# Patient Record
Sex: FEMALE | Race: WHITE | Employment: OTHER | URBAN - METROPOLITAN AREA
[De-identification: names, ages, dates, MRNs, and addresses within clinical notes are randomized per-mention and may not be internally consistent; named-entity substitution may affect disease eponyms.]

---

## 2017-01-25 PROBLEM — J44.9 COPD, MILD (HCC): Status: ACTIVE | Noted: 2017-01-25

## 2017-02-24 ENCOUNTER — OFFICE VISIT (OUTPATIENT)
Dept: CARDIOLOGY CLINIC | Age: 67
End: 2017-02-24

## 2017-02-24 VITALS
HEART RATE: 84 BPM | WEIGHT: 115 LBS | BODY MASS INDEX: 18.05 KG/M2 | SYSTOLIC BLOOD PRESSURE: 130 MMHG | DIASTOLIC BLOOD PRESSURE: 88 MMHG | HEIGHT: 67 IN

## 2017-02-24 DIAGNOSIS — I21.02 ST ELEVATION MYOCARDIAL INFARCTION INVOLVING LEFT ANTERIOR DESCENDING (LAD) CORONARY ARTERY (HCC): ICD-10-CM

## 2017-02-24 DIAGNOSIS — I25.810 CORONARY ARTERY DISEASE INVOLVING CORONARY BYPASS GRAFT OF NATIVE HEART WITHOUT ANGINA PECTORIS: ICD-10-CM

## 2017-02-24 DIAGNOSIS — I10 ESSENTIAL HYPERTENSION: Primary | ICD-10-CM

## 2017-02-24 DIAGNOSIS — I34.0 NON-RHEUMATIC MITRAL REGURGITATION: ICD-10-CM

## 2017-02-24 DIAGNOSIS — Z95.1 S/P CABG X 4: ICD-10-CM

## 2017-02-24 DIAGNOSIS — Z72.0 TOBACCO ABUSE: ICD-10-CM

## 2017-02-24 PROCEDURE — 99214 OFFICE O/P EST MOD 30 MIN: CPT | Performed by: INTERNAL MEDICINE

## 2017-02-24 RX ORDER — LISINOPRIL 5 MG/1
5 TABLET ORAL DAILY
Qty: 30 TABLET | Refills: 5 | Status: SHIPPED | OUTPATIENT
Start: 2017-02-24 | End: 2017-09-21 | Stop reason: SDUPTHER

## 2017-02-24 RX ORDER — CLONAZEPAM 0.5 MG/1
0.5 TABLET ORAL 2 TIMES DAILY PRN
COMMUNITY
End: 2017-06-08 | Stop reason: ALTCHOICE

## 2017-02-24 RX ORDER — ATORVASTATIN CALCIUM 40 MG/1
40 TABLET, FILM COATED ORAL DAILY
Qty: 30 TABLET | Refills: 5 | Status: SHIPPED | OUTPATIENT
Start: 2017-02-24 | End: 2017-09-21 | Stop reason: SDUPTHER

## 2017-02-24 RX ORDER — CARVEDILOL 6.25 MG/1
6.25 TABLET ORAL 2 TIMES DAILY
Qty: 60 TABLET | Refills: 5 | Status: SHIPPED | OUTPATIENT
Start: 2017-02-24 | End: 2017-09-21 | Stop reason: SDUPTHER

## 2017-03-07 ENCOUNTER — PROCEDURE VISIT (OUTPATIENT)
Dept: CARDIOLOGY CLINIC | Age: 67
End: 2017-03-07

## 2017-03-07 DIAGNOSIS — Z72.0 TOBACCO ABUSE: ICD-10-CM

## 2017-03-07 DIAGNOSIS — Z95.1 S/P CABG X 4: ICD-10-CM

## 2017-03-07 DIAGNOSIS — I34.0 NON-RHEUMATIC MITRAL REGURGITATION: Primary | ICD-10-CM

## 2017-03-07 DIAGNOSIS — I34.0 NON-RHEUMATIC MITRAL REGURGITATION: ICD-10-CM

## 2017-03-07 DIAGNOSIS — I25.810 CORONARY ARTERY DISEASE INVOLVING CORONARY BYPASS GRAFT OF NATIVE HEART WITHOUT ANGINA PECTORIS: ICD-10-CM

## 2017-03-07 DIAGNOSIS — I21.02 ST ELEVATION MYOCARDIAL INFARCTION INVOLVING LEFT ANTERIOR DESCENDING (LAD) CORONARY ARTERY (HCC): ICD-10-CM

## 2017-03-07 DIAGNOSIS — R07.89 OTHER CHEST PAIN: Primary | ICD-10-CM

## 2017-03-07 DIAGNOSIS — I10 ESSENTIAL HYPERTENSION: ICD-10-CM

## 2017-03-07 LAB
LV EF: 49 %
LV EF: 53 %
LVEF MODALITY: NORMAL
LVEF MODALITY: NORMAL

## 2017-03-07 PROCEDURE — 93016 CV STRESS TEST SUPVJ ONLY: CPT | Performed by: INTERNAL MEDICINE

## 2017-03-07 PROCEDURE — A9500 TC99M SESTAMIBI: HCPCS | Performed by: INTERNAL MEDICINE

## 2017-03-07 PROCEDURE — 93306 TTE W/DOPPLER COMPLETE: CPT | Performed by: INTERNAL MEDICINE

## 2017-03-07 PROCEDURE — 93017 CV STRESS TEST TRACING ONLY: CPT | Performed by: INTERNAL MEDICINE

## 2017-03-07 PROCEDURE — 78452 HT MUSCLE IMAGE SPECT MULT: CPT | Performed by: INTERNAL MEDICINE

## 2017-03-07 PROCEDURE — 93018 CV STRESS TEST I&R ONLY: CPT | Performed by: INTERNAL MEDICINE

## 2017-03-08 ENCOUNTER — TELEPHONE (OUTPATIENT)
Dept: CARDIOLOGY CLINIC | Age: 67
End: 2017-03-08

## 2017-03-09 ENCOUNTER — OFFICE VISIT (OUTPATIENT)
Dept: CARDIOLOGY CLINIC | Age: 67
End: 2017-03-09

## 2017-03-09 VITALS
SYSTOLIC BLOOD PRESSURE: 132 MMHG | HEART RATE: 77 BPM | WEIGHT: 113 LBS | DIASTOLIC BLOOD PRESSURE: 82 MMHG | BODY MASS INDEX: 18.16 KG/M2 | HEIGHT: 66 IN | OXYGEN SATURATION: 97 %

## 2017-03-09 DIAGNOSIS — I34.0 NON-RHEUMATIC MITRAL REGURGITATION: ICD-10-CM

## 2017-03-09 DIAGNOSIS — Z95.1 S/P CABG X 4: ICD-10-CM

## 2017-03-09 DIAGNOSIS — I21.02 ST ELEVATION MYOCARDIAL INFARCTION INVOLVING LEFT ANTERIOR DESCENDING (LAD) CORONARY ARTERY (HCC): ICD-10-CM

## 2017-03-09 DIAGNOSIS — Z72.0 TOBACCO ABUSE: ICD-10-CM

## 2017-03-09 DIAGNOSIS — I25.810 CORONARY ARTERY DISEASE INVOLVING CORONARY BYPASS GRAFT OF NATIVE HEART WITHOUT ANGINA PECTORIS: Primary | ICD-10-CM

## 2017-03-09 DIAGNOSIS — J44.9 COPD, MILD (HCC): ICD-10-CM

## 2017-03-09 PROCEDURE — 99213 OFFICE O/P EST LOW 20 MIN: CPT | Performed by: INTERNAL MEDICINE

## 2017-06-08 ENCOUNTER — OFFICE VISIT (OUTPATIENT)
Dept: CARDIOLOGY CLINIC | Age: 67
End: 2017-06-08

## 2017-06-08 VITALS
DIASTOLIC BLOOD PRESSURE: 80 MMHG | WEIGHT: 108 LBS | HEIGHT: 66 IN | RESPIRATION RATE: 20 BRPM | HEART RATE: 80 BPM | SYSTOLIC BLOOD PRESSURE: 120 MMHG | BODY MASS INDEX: 17.36 KG/M2

## 2017-06-08 DIAGNOSIS — I10 ESSENTIAL HYPERTENSION: ICD-10-CM

## 2017-06-08 DIAGNOSIS — Z72.0 TOBACCO ABUSE: ICD-10-CM

## 2017-06-08 DIAGNOSIS — J43.9 PULMONARY EMPHYSEMA, UNSPECIFIED EMPHYSEMA TYPE (HCC): ICD-10-CM

## 2017-06-08 DIAGNOSIS — Z95.1 S/P CABG X 4: ICD-10-CM

## 2017-06-08 DIAGNOSIS — I34.0 NON-RHEUMATIC MITRAL REGURGITATION: ICD-10-CM

## 2017-06-08 DIAGNOSIS — I25.810 CORONARY ARTERY DISEASE INVOLVING CORONARY BYPASS GRAFT OF NATIVE HEART WITHOUT ANGINA PECTORIS: Primary | ICD-10-CM

## 2017-06-08 PROBLEM — R07.89 ATYPICAL CHEST PAIN: Status: ACTIVE | Noted: 2017-06-08

## 2017-06-08 PROCEDURE — 99214 OFFICE O/P EST MOD 30 MIN: CPT | Performed by: INTERNAL MEDICINE

## 2017-06-08 PROCEDURE — 93000 ELECTROCARDIOGRAM COMPLETE: CPT | Performed by: INTERNAL MEDICINE

## 2017-06-08 RX ORDER — PREDNISONE 10 MG/1
10 TABLET ORAL DAILY
Status: ON HOLD | COMMUNITY
End: 2017-11-23

## 2017-06-08 RX ORDER — DIPHENHYDRAMINE HCL 50 MG
50 CAPSULE ORAL NIGHTLY PRN
COMMUNITY
End: 2019-11-09

## 2017-06-08 RX ORDER — NITROGLYCERIN 0.4 MG/1
0.4 TABLET SUBLINGUAL EVERY 5 MIN PRN
Qty: 25 TABLET | Refills: 3 | Status: SHIPPED | OUTPATIENT
Start: 2017-06-08 | End: 2021-01-05 | Stop reason: SDUPTHER

## 2017-06-08 RX ORDER — ONDANSETRON 4 MG/1
TABLET, ORALLY DISINTEGRATING ORAL
COMMUNITY
End: 2017-09-21 | Stop reason: ALTCHOICE

## 2017-06-08 RX ORDER — BUDESONIDE AND FORMOTEROL FUMARATE DIHYDRATE 160; 4.5 UG/1; UG/1
2 AEROSOL RESPIRATORY (INHALATION) 2 TIMES DAILY
COMMUNITY
End: 2021-01-25 | Stop reason: ALTCHOICE

## 2017-06-08 RX ORDER — ISOSORBIDE MONONITRATE 60 MG/1
30 TABLET, EXTENDED RELEASE ORAL DAILY
Qty: 30 TABLET | Refills: 3 | Status: SHIPPED | OUTPATIENT
Start: 2017-06-08 | End: 2017-09-21 | Stop reason: SDUPTHER

## 2017-06-08 RX ORDER — DOCUSATE SODIUM 100 MG/1
100 CAPSULE, LIQUID FILLED ORAL DAILY PRN
COMMUNITY

## 2017-06-08 RX ORDER — SIMETHICONE 80 MG
80 TABLET,CHEWABLE ORAL PRN
COMMUNITY
End: 2017-09-21

## 2017-06-10 PROBLEM — R07.9 CHEST PAIN: Status: ACTIVE | Noted: 2017-06-08

## 2017-06-14 ENCOUNTER — HOSPITAL ENCOUNTER (OUTPATIENT)
Dept: CARDIAC REHAB | Age: 67
Discharge: HOME | End: 2017-06-14
Attending: NURSE PRACTITIONER | Admitting: NURSE PRACTITIONER

## 2017-09-21 ENCOUNTER — OFFICE VISIT (OUTPATIENT)
Dept: CARDIOLOGY CLINIC | Age: 67
End: 2017-09-21

## 2017-09-21 VITALS
WEIGHT: 111 LBS | DIASTOLIC BLOOD PRESSURE: 100 MMHG | HEART RATE: 104 BPM | BODY MASS INDEX: 17.84 KG/M2 | SYSTOLIC BLOOD PRESSURE: 150 MMHG | HEIGHT: 66 IN

## 2017-09-21 DIAGNOSIS — I25.810 CORONARY ARTERY DISEASE INVOLVING CORONARY BYPASS GRAFT OF NATIVE HEART WITHOUT ANGINA PECTORIS: Primary | ICD-10-CM

## 2017-09-21 DIAGNOSIS — J43.9 PULMONARY EMPHYSEMA, UNSPECIFIED EMPHYSEMA TYPE (HCC): ICD-10-CM

## 2017-09-21 DIAGNOSIS — Z95.1 S/P CABG X 4: ICD-10-CM

## 2017-09-21 DIAGNOSIS — I21.02 ST ELEVATION MYOCARDIAL INFARCTION INVOLVING LEFT ANTERIOR DESCENDING (LAD) CORONARY ARTERY (HCC): ICD-10-CM

## 2017-09-21 DIAGNOSIS — I10 ESSENTIAL HYPERTENSION: ICD-10-CM

## 2017-09-21 DIAGNOSIS — I34.0 NON-RHEUMATIC MITRAL REGURGITATION: ICD-10-CM

## 2017-09-21 DIAGNOSIS — Z72.0 TOBACCO ABUSE: ICD-10-CM

## 2017-09-21 PROCEDURE — 99214 OFFICE O/P EST MOD 30 MIN: CPT | Performed by: INTERNAL MEDICINE

## 2017-09-21 RX ORDER — CARVEDILOL 6.25 MG/1
6.25 TABLET ORAL 2 TIMES DAILY
Qty: 60 TABLET | Refills: 5 | Status: SHIPPED | OUTPATIENT
Start: 2017-09-21 | End: 2018-04-12 | Stop reason: SDUPTHER

## 2017-09-21 RX ORDER — METHYLPREDNISOLONE 4 MG/1
TABLET ORAL
Status: ON HOLD | COMMUNITY
Start: 2017-09-19 | End: 2017-11-23 | Stop reason: HOSPADM

## 2017-09-21 RX ORDER — LISINOPRIL 5 MG/1
5 TABLET ORAL DAILY
Qty: 30 TABLET | Refills: 5 | Status: SHIPPED | OUTPATIENT
Start: 2017-09-21 | End: 2018-03-06 | Stop reason: SDUPTHER

## 2017-09-21 RX ORDER — ATORVASTATIN CALCIUM 40 MG/1
40 TABLET, FILM COATED ORAL DAILY
Qty: 30 TABLET | Refills: 5 | Status: SHIPPED | OUTPATIENT
Start: 2017-09-21 | End: 2020-10-28 | Stop reason: SDUPTHER

## 2017-09-21 RX ORDER — ISOSORBIDE MONONITRATE 60 MG/1
30 TABLET, EXTENDED RELEASE ORAL DAILY
Qty: 30 TABLET | Refills: 3 | Status: SHIPPED | OUTPATIENT
Start: 2017-09-21 | End: 2018-04-12 | Stop reason: SDUPTHER

## 2017-11-21 PROBLEM — I51.89 DIASTOLIC DYSFUNCTION WITHOUT HEART FAILURE: Chronic | Status: ACTIVE | Noted: 2017-11-21

## 2017-11-21 PROBLEM — J44.1 COPD EXACERBATION (HCC): Status: ACTIVE | Noted: 2017-11-21

## 2017-11-21 PROBLEM — E87.1 HYPONATREMIA: Status: ACTIVE | Noted: 2017-11-21

## 2017-11-21 PROBLEM — I27.20 PULMONARY HTN (HCC): Chronic | Status: ACTIVE | Noted: 2017-11-21

## 2017-11-23 PROBLEM — J44.1 COPD EXACERBATION (HCC): Status: RESOLVED | Noted: 2017-11-21 | Resolved: 2017-11-23

## 2018-03-08 RX ORDER — LISINOPRIL 5 MG/1
5 TABLET ORAL DAILY
Qty: 90 TABLET | Refills: 3 | Status: SHIPPED | OUTPATIENT
Start: 2018-03-08 | End: 2018-04-12 | Stop reason: SDUPTHER

## 2018-04-12 ENCOUNTER — TELEPHONE (OUTPATIENT)
Dept: CARDIOLOGY CLINIC | Age: 68
End: 2018-04-12

## 2018-04-12 RX ORDER — ISOSORBIDE MONONITRATE 60 MG/1
30 TABLET, EXTENDED RELEASE ORAL DAILY
Qty: 30 TABLET | Refills: 2 | Status: SHIPPED | OUTPATIENT
Start: 2018-04-12 | End: 2020-10-28 | Stop reason: SDUPTHER

## 2018-04-12 RX ORDER — CARVEDILOL 6.25 MG/1
6.25 TABLET ORAL 2 TIMES DAILY
Qty: 60 TABLET | Refills: 3 | Status: SHIPPED | OUTPATIENT
Start: 2018-04-12 | End: 2018-08-22 | Stop reason: SDUPTHER

## 2018-04-12 RX ORDER — LISINOPRIL 5 MG/1
5 TABLET ORAL DAILY
Qty: 90 TABLET | Refills: 3 | Status: SHIPPED | OUTPATIENT
Start: 2018-04-12 | End: 2020-10-28 | Stop reason: SDUPTHER

## 2018-04-13 ENCOUNTER — HOSPITAL ENCOUNTER (OUTPATIENT)
Dept: CARDIAC REHAB | Age: 68
Discharge: HOME | End: 2018-04-13
Attending: NURSE PRACTITIONER | Admitting: NURSE PRACTITIONER

## 2018-04-13 ENCOUNTER — TELEPHONE (OUTPATIENT)
Dept: CARDIOLOGY CLINIC | Age: 68
End: 2018-04-13

## 2018-04-13 LAB
ALT SERPL-CCNC: 45 U/L (ref 10–40)
ANION GAP SERPL CALCULATED.3IONS-SCNC: 13 MMOL/L (ref 4–16)
BASOPHILS ABSOLUTE: 0.1 K/CU MM
BASOPHILS RELATIVE PERCENT: 1 % (ref 0–1)
BUN BLDV-MCNC: 19 MG/DL (ref 6–23)
CALCIUM SERPL-MCNC: 9.6 MG/DL (ref 8.3–10.6)
CHLORIDE BLD-SCNC: 93 MMOL/L (ref 99–110)
CHOLESTEROL, FASTING: 137 MG/DL
CO2: 25 MMOL/L (ref 21–32)
CREAT SERPL-MCNC: 0.5 MG/DL (ref 0.6–1.1)
DIFFERENTIAL TYPE: ABNORMAL
EOSINOPHILS ABSOLUTE: 0.4 K/CU MM
EOSINOPHILS RELATIVE PERCENT: 3.2 % (ref 0–3)
GFR AFRICAN AMERICAN: >60 ML/MIN/1.73M2
GFR NON-AFRICAN AMERICAN: >60 ML/MIN/1.73M2
GLUCOSE FASTING: 116 MG/DL (ref 70–99)
HCT VFR BLD CALC: 38.5 % (ref 37–47)
HDLC SERPL-MCNC: 32 MG/DL
HEMOGLOBIN: 12.8 GM/DL (ref 12.5–16)
IMMATURE NEUTROPHIL %: 0.4 % (ref 0–0.43)
LDL CHOLESTEROL DIRECT: 98 MG/DL
LYMPHOCYTES ABSOLUTE: 3.7 K/CU MM
LYMPHOCYTES RELATIVE PERCENT: 31.6 % (ref 24–44)
MCH RBC QN AUTO: 30.9 PG (ref 27–31)
MCHC RBC AUTO-ENTMCNC: 33.2 % (ref 32–36)
MCV RBC AUTO: 93 FL (ref 78–100)
MONOCYTES ABSOLUTE: 0.9 K/CU MM
MONOCYTES RELATIVE PERCENT: 7.7 % (ref 0–4)
PDW BLD-RTO: 12.6 % (ref 11.7–14.9)
PLATELET # BLD: 396 K/CU MM (ref 140–440)
PMV BLD AUTO: 9.6 FL (ref 7.5–11.1)
POTASSIUM SERPL-SCNC: 4.5 MMOL/L (ref 3.5–5.1)
PROSTATE SPECIFIC ANTIGEN: 0.01 NG/ML (ref 0–4)
RBC # BLD: 4.14 M/CU MM (ref 4.2–5.4)
SEGMENTED NEUTROPHILS ABSOLUTE COUNT: 6.6 K/CU MM
SEGMENTED NEUTROPHILS RELATIVE PERCENT: 56.1 % (ref 36–66)
SODIUM BLD-SCNC: 131 MMOL/L (ref 135–145)
TOTAL IMMATURE NEUTOROPHIL: 0.05 K/CU MM
TRIGLYCERIDE, FASTING: 91 MG/DL
TSH HIGH SENSITIVITY: 2.51 UIU/ML (ref 0.27–4.2)
WBC # BLD: 11.8 K/CU MM (ref 4–10.5)

## 2018-04-24 ENCOUNTER — OFFICE VISIT (OUTPATIENT)
Dept: CARDIOLOGY CLINIC | Age: 68
End: 2018-04-24

## 2018-04-24 VITALS
HEART RATE: 68 BPM | DIASTOLIC BLOOD PRESSURE: 82 MMHG | BODY MASS INDEX: 18.16 KG/M2 | SYSTOLIC BLOOD PRESSURE: 120 MMHG | WEIGHT: 113 LBS | HEIGHT: 66 IN

## 2018-04-24 DIAGNOSIS — I25.810 CORONARY ARTERY DISEASE INVOLVING CORONARY BYPASS GRAFT OF NATIVE HEART WITHOUT ANGINA PECTORIS: Primary | ICD-10-CM

## 2018-04-24 DIAGNOSIS — I27.20 PULMONARY HTN (HCC): Chronic | ICD-10-CM

## 2018-04-24 DIAGNOSIS — Z95.1 S/P CABG X 4: ICD-10-CM

## 2018-04-24 DIAGNOSIS — I21.02 ST ELEVATION MYOCARDIAL INFARCTION INVOLVING LEFT ANTERIOR DESCENDING (LAD) CORONARY ARTERY (HCC): ICD-10-CM

## 2018-04-24 DIAGNOSIS — I34.0 NON-RHEUMATIC MITRAL REGURGITATION: ICD-10-CM

## 2018-04-24 DIAGNOSIS — I10 ESSENTIAL HYPERTENSION: ICD-10-CM

## 2018-04-24 DIAGNOSIS — Z72.0 TOBACCO ABUSE: ICD-10-CM

## 2018-04-24 DIAGNOSIS — I51.89 DIASTOLIC DYSFUNCTION WITHOUT HEART FAILURE: Chronic | ICD-10-CM

## 2018-04-24 DIAGNOSIS — J44.9 COPD, MILD (HCC): ICD-10-CM

## 2018-04-24 PROCEDURE — 1036F TOBACCO NON-USER: CPT | Performed by: INTERNAL MEDICINE

## 2018-04-24 PROCEDURE — 3023F SPIROM DOC REV: CPT | Performed by: INTERNAL MEDICINE

## 2018-04-24 PROCEDURE — G8419 CALC BMI OUT NRM PARAM NOF/U: HCPCS | Performed by: INTERNAL MEDICINE

## 2018-04-24 PROCEDURE — 99214 OFFICE O/P EST MOD 30 MIN: CPT | Performed by: INTERNAL MEDICINE

## 2018-04-24 PROCEDURE — 4040F PNEUMOC VAC/ADMIN/RCVD: CPT | Performed by: INTERNAL MEDICINE

## 2018-04-24 PROCEDURE — G8427 DOCREV CUR MEDS BY ELIG CLIN: HCPCS | Performed by: INTERNAL MEDICINE

## 2018-04-24 PROCEDURE — G8926 SPIRO NO PERF OR DOC: HCPCS | Performed by: INTERNAL MEDICINE

## 2018-04-24 PROCEDURE — 3017F COLORECTAL CA SCREEN DOC REV: CPT | Performed by: INTERNAL MEDICINE

## 2018-04-24 PROCEDURE — G8599 NO ASA/ANTIPLAT THER USE RNG: HCPCS | Performed by: INTERNAL MEDICINE

## 2018-04-24 PROCEDURE — G8400 PT W/DXA NO RESULTS DOC: HCPCS | Performed by: INTERNAL MEDICINE

## 2018-04-24 PROCEDURE — 1090F PRES/ABSN URINE INCON ASSESS: CPT | Performed by: INTERNAL MEDICINE

## 2018-04-24 PROCEDURE — 1123F ACP DISCUSS/DSCN MKR DOCD: CPT | Performed by: INTERNAL MEDICINE

## 2018-04-24 RX ORDER — LANSOPRAZOLE 15 MG/1
15 CAPSULE, DELAYED RELEASE ORAL DAILY
COMMUNITY
End: 2018-10-18

## 2018-04-24 RX ORDER — MELATONIN 10 MG
10 CAPSULE ORAL NIGHTLY PRN
COMMUNITY
End: 2022-03-24

## 2018-04-24 RX ORDER — ALBUTEROL SULFATE 90 UG/1
2 AEROSOL, METERED RESPIRATORY (INHALATION) EVERY 6 HOURS PRN
COMMUNITY
End: 2022-07-26 | Stop reason: SDUPTHER

## 2018-04-24 RX ORDER — MONTELUKAST SODIUM 10 MG/1
10 TABLET ORAL NIGHTLY
COMMUNITY
End: 2021-01-05 | Stop reason: SDUPTHER

## 2018-04-26 ENCOUNTER — TELEPHONE (OUTPATIENT)
Dept: CARDIOLOGY CLINIC | Age: 68
End: 2018-04-26

## 2018-04-26 ENCOUNTER — PROCEDURE VISIT (OUTPATIENT)
Dept: CARDIOLOGY CLINIC | Age: 68
End: 2018-04-26

## 2018-04-26 DIAGNOSIS — Z95.1 S/P CABG X 4: ICD-10-CM

## 2018-04-26 DIAGNOSIS — I77.9 CAROTID DISEASE, BILATERAL (HCC): Primary | ICD-10-CM

## 2018-04-26 DIAGNOSIS — Z72.0 TOBACCO ABUSE: ICD-10-CM

## 2018-04-26 DIAGNOSIS — I10 ESSENTIAL HYPERTENSION: ICD-10-CM

## 2018-04-26 DIAGNOSIS — I34.0 NON-RHEUMATIC MITRAL REGURGITATION: ICD-10-CM

## 2018-04-26 DIAGNOSIS — I25.810 CORONARY ARTERY DISEASE INVOLVING CORONARY BYPASS GRAFT OF NATIVE HEART WITHOUT ANGINA PECTORIS: ICD-10-CM

## 2018-04-26 DIAGNOSIS — I21.02 ST ELEVATION MYOCARDIAL INFARCTION INVOLVING LEFT ANTERIOR DESCENDING (LAD) CORONARY ARTERY (HCC): ICD-10-CM

## 2018-04-26 DIAGNOSIS — J44.9 COPD, MILD (HCC): ICD-10-CM

## 2018-04-26 DIAGNOSIS — I27.20 PULMONARY HTN (HCC): Chronic | ICD-10-CM

## 2018-04-26 DIAGNOSIS — I51.89 DIASTOLIC DYSFUNCTION WITHOUT HEART FAILURE: Chronic | ICD-10-CM

## 2018-04-26 PROCEDURE — 93880 EXTRACRANIAL BILAT STUDY: CPT | Performed by: INTERNAL MEDICINE

## 2018-08-21 RX ORDER — CARVEDILOL 6.25 MG/1
6.25 TABLET ORAL 2 TIMES DAILY
Qty: 60 TABLET | Refills: 6 | Status: CANCELLED | OUTPATIENT
Start: 2018-08-21 | End: 2019-08-21

## 2018-08-22 RX ORDER — CARVEDILOL 6.25 MG/1
6.25 TABLET ORAL 2 TIMES DAILY
Qty: 60 TABLET | Refills: 3 | Status: SHIPPED | OUTPATIENT
Start: 2018-08-22 | End: 2018-08-24 | Stop reason: SDUPTHER

## 2018-08-24 RX ORDER — CARVEDILOL 6.25 MG/1
6.25 TABLET ORAL 2 TIMES DAILY
Qty: 60 TABLET | Refills: 3 | Status: SHIPPED | OUTPATIENT
Start: 2018-08-24 | End: 2019-02-24 | Stop reason: SDUPTHER

## 2018-10-18 ENCOUNTER — OFFICE VISIT (OUTPATIENT)
Dept: CARDIOLOGY CLINIC | Age: 68
End: 2018-10-18
Payer: MEDICARE

## 2018-10-18 VITALS
HEIGHT: 66 IN | WEIGHT: 123 LBS | DIASTOLIC BLOOD PRESSURE: 88 MMHG | RESPIRATION RATE: 18 BRPM | BODY MASS INDEX: 19.77 KG/M2 | SYSTOLIC BLOOD PRESSURE: 132 MMHG | HEART RATE: 92 BPM

## 2018-10-18 DIAGNOSIS — I25.810 CORONARY ARTERY DISEASE INVOLVING CORONARY BYPASS GRAFT OF NATIVE HEART WITHOUT ANGINA PECTORIS: Primary | ICD-10-CM

## 2018-10-18 DIAGNOSIS — I34.0 NON-RHEUMATIC MITRAL REGURGITATION: ICD-10-CM

## 2018-10-18 DIAGNOSIS — I21.02 ST ELEVATION MYOCARDIAL INFARCTION INVOLVING LEFT ANTERIOR DESCENDING (LAD) CORONARY ARTERY (HCC): ICD-10-CM

## 2018-10-18 DIAGNOSIS — I51.89 DIASTOLIC DYSFUNCTION WITHOUT HEART FAILURE: Chronic | ICD-10-CM

## 2018-10-18 DIAGNOSIS — I10 ESSENTIAL HYPERTENSION: ICD-10-CM

## 2018-10-18 DIAGNOSIS — J43.9 PULMONARY EMPHYSEMA, UNSPECIFIED EMPHYSEMA TYPE (HCC): ICD-10-CM

## 2018-10-18 DIAGNOSIS — J45.50 SEVERE PERSISTENT ASTHMA, UNSPECIFIED WHETHER COMPLICATED: ICD-10-CM

## 2018-10-18 DIAGNOSIS — Z95.1 S/P CABG X 4: ICD-10-CM

## 2018-10-18 DIAGNOSIS — I27.20 PULMONARY HTN (HCC): Chronic | ICD-10-CM

## 2018-10-18 PROCEDURE — 99214 OFFICE O/P EST MOD 30 MIN: CPT | Performed by: INTERNAL MEDICINE

## 2018-10-18 PROCEDURE — G8484 FLU IMMUNIZE NO ADMIN: HCPCS | Performed by: INTERNAL MEDICINE

## 2018-10-18 PROCEDURE — 3023F SPIROM DOC REV: CPT | Performed by: INTERNAL MEDICINE

## 2018-10-18 PROCEDURE — G8427 DOCREV CUR MEDS BY ELIG CLIN: HCPCS | Performed by: INTERNAL MEDICINE

## 2018-10-18 PROCEDURE — 1090F PRES/ABSN URINE INCON ASSESS: CPT | Performed by: INTERNAL MEDICINE

## 2018-10-18 PROCEDURE — 4040F PNEUMOC VAC/ADMIN/RCVD: CPT | Performed by: INTERNAL MEDICINE

## 2018-10-18 PROCEDURE — 1036F TOBACCO NON-USER: CPT | Performed by: INTERNAL MEDICINE

## 2018-10-18 PROCEDURE — G8926 SPIRO NO PERF OR DOC: HCPCS | Performed by: INTERNAL MEDICINE

## 2018-10-18 PROCEDURE — 3017F COLORECTAL CA SCREEN DOC REV: CPT | Performed by: INTERNAL MEDICINE

## 2018-10-18 PROCEDURE — G8420 CALC BMI NORM PARAMETERS: HCPCS | Performed by: INTERNAL MEDICINE

## 2018-10-18 PROCEDURE — 1123F ACP DISCUSS/DSCN MKR DOCD: CPT | Performed by: INTERNAL MEDICINE

## 2018-10-18 PROCEDURE — G8400 PT W/DXA NO RESULTS DOC: HCPCS | Performed by: INTERNAL MEDICINE

## 2018-10-18 PROCEDURE — G8599 NO ASA/ANTIPLAT THER USE RNG: HCPCS | Performed by: INTERNAL MEDICINE

## 2018-10-18 PROCEDURE — 1101F PT FALLS ASSESS-DOCD LE1/YR: CPT | Performed by: INTERNAL MEDICINE

## 2018-10-18 RX ORDER — PREDNISONE 10 MG/1
10 TABLET ORAL DAILY
COMMUNITY
Start: 2018-10-03 | End: 2021-01-21 | Stop reason: SDUPTHER

## 2018-10-18 NOTE — PATIENT INSTRUCTIONS
CAD:Yes   clinically stable. Patient is on optimal medical regimen ( see medication list above )  -     CORONARY ARTERY DISEASE: Patient is currently  asymptomatic from CAD. - changes in  treatment:   no           - Testing ordered:  no  [facility] classification: 1  [place] RISK SCORE:  YANA RISK SCORE:  HTN:well controlled on current medical regimen, see list above. - changes in  treatment:   no   CARDIOMYOPATHY: None known   CONGESTIVE HEART FAILURE: NO KNOWN HISTORY.   VHD: No significant VHD noted  DYSLIPIDEMIA: Patient's profile is at / near Goal.yes,                                 HDL is low                                Tolerating current medical regimen wellyes,                                                               See most recent Lab values in Labs section above. OTHER RELEVANT DIAGNOSIS:as noted in patient's active problem list:Exacerbation of COPD, patient to see primary care  TESTS ORDERED: None this visit                                   All previously ordered tests reviewed. MEDICATIONS: CPM   Office f/u in six months. Primary/secondary prevention is the goal by aggressive risk modification, healthy and therapeutic life style changes for cardiovascular risk reduction. Various goals are discussed and multiple questions answered.

## 2019-02-26 RX ORDER — CARVEDILOL 6.25 MG/1
6.25 TABLET ORAL 2 TIMES DAILY WITH MEALS
Qty: 60 TABLET | Refills: 3 | Status: SHIPPED | OUTPATIENT
Start: 2019-02-26 | End: 2019-07-01 | Stop reason: SDUPTHER

## 2019-07-01 RX ORDER — CARVEDILOL 6.25 MG/1
6.25 TABLET ORAL 2 TIMES DAILY WITH MEALS
Qty: 180 TABLET | Refills: 3 | Status: SHIPPED | OUTPATIENT
Start: 2019-07-01 | End: 2020-09-03 | Stop reason: SDUPTHER

## 2019-07-17 ENCOUNTER — HOSPITAL ENCOUNTER (OUTPATIENT)
Age: 69
Discharge: HOME | End: 2019-07-17
Payer: MEDICARE

## 2019-07-17 PROCEDURE — 87086 URINE CULTURE/COLONY COUNT: CPT

## 2019-07-17 PROCEDURE — 87186 SC STD MICRODIL/AGAR DIL: CPT

## 2019-07-17 PROCEDURE — 87077 CULTURE AEROBIC IDENTIFY: CPT

## 2019-07-20 LAB
CULTURE: ABNORMAL
Lab: ABNORMAL
SPECIMEN: ABNORMAL
TOTAL COLONY COUNT: ABNORMAL

## 2019-09-25 ENCOUNTER — ANESTHESIA EVENT (OUTPATIENT)
Dept: OPERATING ROOM | Age: 69
End: 2019-09-25
Payer: MEDICARE

## 2019-09-25 NOTE — ANESTHESIA PRE PROCEDURE
0.4 MG SL tablet Place 1 tablet under the tongue every 5 minutes as needed for Chest pain 6/8/17   Josef Loya MD   albuterol-ipratropium (COMBIVENT RESPIMAT)  MCG/ACT AERS inhaler Inhale 1 puff into the lungs 4 times daily 7/27/16   Doc Murcia MD   hydrochlorothiazide (MICROZIDE) 12.5 MG capsule Take 1 capsule by mouth every morning 6/4/15   Cecilia Peralta MD   aspirin 81 MG chewable tablet Take 1 tablet by mouth daily 6/3/15   Heath Singleton MD   FLUoxetine (PROZAC) 20 MG capsule Take 40 mg by mouth daily    Historical Provider, MD   SUMAtriptan (IMITREX) 100 MG tablet Take 100 mg by mouth once as needed for Migraine May repeat in 2 hours in needed    Historical Provider, MD       Current medications:    No current facility-administered medications for this encounter. Current Outpatient Medications   Medication Sig Dispense Refill    carvedilol (COREG) 6.25 MG tablet Take 1 tablet by mouth 2 times daily (with meals) 180 tablet 3    predniSONE (DELTASONE) 10 MG tablet       zolpidem (AMBIEN CR) 12.5 MG extended release tablet Take 12.5 mg by mouth nightly as needed.  .      albuterol sulfate  (90 Base) MCG/ACT inhaler Inhale 2 puffs into the lungs every 6 hours as needed for Wheezing      montelukast (SINGULAIR) 10 MG tablet Take 10 mg by mouth nightly      Melatonin 10 MG CAPS Take 10 mg by mouth as needed      isosorbide mononitrate (IMDUR) 60 MG extended release tablet Take 0.5 tablets by mouth daily 30 tablet 2    lisinopril (PRINIVIL;ZESTRIL) 5 MG tablet Take 1 tablet by mouth daily 90 tablet 3    ipratropium-albuterol (DUONEB) 0.5-2.5 (3) MG/3ML SOLN nebulizer solution Take 3 mLs by nebulization 4 times daily 360 mL 0    atorvastatin (LIPITOR) 40 MG tablet Take 1 tablet by mouth daily 30 tablet 5    budesonide-formoterol (SYMBICORT) 160-4.5 MCG/ACT AERO Inhale 2 puffs into the lungs 2 times daily      docusate sodium (COLACE) 100 MG capsule Take 100 mg by mouth as needed       diphenhydrAMINE (BENADRYL) 50 MG capsule Take 50 mg by mouth nightly as needed for Itching      nitroGLYCERIN (NITROSTAT) 0.4 MG SL tablet Place 1 tablet under the tongue every 5 minutes as needed for Chest pain 25 tablet 3    albuterol-ipratropium (COMBIVENT RESPIMAT)  MCG/ACT AERS inhaler Inhale 1 puff into the lungs 4 times daily 1 Inhaler 5    hydrochlorothiazide (MICROZIDE) 12.5 MG capsule Take 1 capsule by mouth every morning 30 capsule 3    aspirin 81 MG chewable tablet Take 1 tablet by mouth daily 30 tablet 3    FLUoxetine (PROZAC) 20 MG capsule Take 40 mg by mouth daily      SUMAtriptan (IMITREX) 100 MG tablet Take 100 mg by mouth once as needed for Migraine May repeat in 2 hours in needed         Allergies: Allergies   Allergen Reactions    Seasonal Itching       Problem List:    Patient Active Problem List   Diagnosis Code    [facility] I21.3    HTN (hypertension) I10    Tobacco abuse Z72.0    COPD (chronic obstructive pulmonary disease) ([facility]) J44.9    Asthma J45.909    Mitral regurgitation I34.0    COPD, mild ([facility]) J44.9    S/P CABG x 4 Z95.1    CAD (coronary artery disease) I25.10    Ischemic chest pain I25.9    Chest pain R07.9    Chronic Hyponatremia Y06.5    Diastolic dysfunction without heart failure I51.89    Pulmonary HTN ([facility]) I27.20       Past Medical History:        Diagnosis Date    Arthritis     Asthma     CAD (coronary artery disease)     COPD (chronic obstructive pulmonary disease) ([facility]     H/O Doppler ultrasound (Bilateral Carotid) 04/26/2018    No hemodynamically significant stenosis noted in the internal carotid artery bilaterally.  H/O echocardiogram 03/07/2017    EF 50-55%. Grade II diastolic dysfunction. Mild to Moderate Mitral regurgitation. Mild Tricuspid regurgitation. Moderate pulmonary HTN. No evidence of any pericardial effusion.     History of nuclear stress test 03/07/2017    Normal

## 2019-09-26 ENCOUNTER — ANESTHESIA (OUTPATIENT)
Dept: OPERATING ROOM | Age: 69
End: 2019-09-26
Payer: MEDICARE

## 2019-09-26 ENCOUNTER — HOSPITAL ENCOUNTER (OUTPATIENT)
Age: 69
Setting detail: OUTPATIENT SURGERY
Discharge: HOME | End: 2019-09-26
Attending: OPHTHALMOLOGY | Admitting: OPHTHALMOLOGY
Payer: MEDICARE

## 2019-09-26 VITALS — OXYGEN SATURATION: 99 % | SYSTOLIC BLOOD PRESSURE: 133 MMHG | DIASTOLIC BLOOD PRESSURE: 71 MMHG

## 2019-09-26 VITALS
TEMPERATURE: 97.6 F | HEART RATE: 82 BPM | OXYGEN SATURATION: 94 % | WEIGHT: 116 LBS | SYSTOLIC BLOOD PRESSURE: 140 MMHG | RESPIRATION RATE: 14 BRPM | HEIGHT: 66 IN | BODY MASS INDEX: 18.64 KG/M2 | DIASTOLIC BLOOD PRESSURE: 79 MMHG

## 2019-09-26 PROCEDURE — 7100000010 HC PHASE II RECOVERY - FIRST 15 MIN: Performed by: OPHTHALMOLOGY

## 2019-09-26 PROCEDURE — 3600000014 HC SURGERY LEVEL 4 ADDTL 15MIN: Performed by: OPHTHALMOLOGY

## 2019-09-26 PROCEDURE — 3600000004 HC SURGERY LEVEL 4 BASE: Performed by: OPHTHALMOLOGY

## 2019-09-26 PROCEDURE — 7100000011 HC PHASE II RECOVERY - ADDTL 15 MIN: Performed by: OPHTHALMOLOGY

## 2019-09-26 PROCEDURE — V2632 POST CHMBR INTRAOCULAR LENS: HCPCS | Performed by: OPHTHALMOLOGY

## 2019-09-26 PROCEDURE — 3700000000 HC ANESTHESIA ATTENDED CARE: Performed by: OPHTHALMOLOGY

## 2019-09-26 PROCEDURE — 6360000002 HC RX W HCPCS: Performed by: NURSE ANESTHETIST, CERTIFIED REGISTERED

## 2019-09-26 PROCEDURE — 3700000001 HC ADD 15 MINUTES (ANESTHESIA): Performed by: OPHTHALMOLOGY

## 2019-09-26 PROCEDURE — 6370000000 HC RX 637 (ALT 250 FOR IP): Performed by: OPHTHALMOLOGY

## 2019-09-26 PROCEDURE — 2709999900 HC NON-CHARGEABLE SUPPLY: Performed by: OPHTHALMOLOGY

## 2019-09-26 PROCEDURE — 2580000003 HC RX 258: Performed by: OPHTHALMOLOGY

## 2019-09-26 PROCEDURE — 2500000003 HC RX 250 WO HCPCS: Performed by: OPHTHALMOLOGY

## 2019-09-26 PROCEDURE — 6360000002 HC RX W HCPCS: Performed by: OPHTHALMOLOGY

## 2019-09-26 PROCEDURE — 2580000003 HC RX 258: Performed by: NURSE ANESTHETIST, CERTIFIED REGISTERED

## 2019-09-26 DEVICE — ACRYSOF(R) IQ ASPHERIC NATURAL IOL, SINGLE-PIECE ACRYLIC FOLDABLE PCL, UV WITH BLUE LIGHTFILTER, 13.0MM LENGTH, 6.0MM ANTERIORASYMMETRIC BICONVEX OPTIC, PLANAR HAPTICS.
Type: IMPLANTABLE DEVICE | Status: FUNCTIONAL
Brand: ACRYSOF®

## 2019-09-26 RX ORDER — DICLOFENAC SODIUM 1 MG/ML
1 SOLUTION/ DROPS OPHTHALMIC SEE ADMIN INSTRUCTIONS
Status: DISCONTINUED | OUTPATIENT
Start: 2019-09-26 | End: 2019-09-26 | Stop reason: HOSPADM

## 2019-09-26 RX ORDER — MOXIFLOXACIN 5 MG/ML
SOLUTION/ DROPS OPHTHALMIC
Status: COMPLETED | OUTPATIENT
Start: 2019-09-26 | End: 2019-09-26

## 2019-09-26 RX ORDER — SODIUM CHLORIDE, SODIUM LACTATE, POTASSIUM CHLORIDE, CALCIUM CHLORIDE 600; 310; 30; 20 MG/100ML; MG/100ML; MG/100ML; MG/100ML
INJECTION, SOLUTION INTRAVENOUS CONTINUOUS PRN
Status: DISCONTINUED | OUTPATIENT
Start: 2019-09-26 | End: 2019-09-26 | Stop reason: SDUPTHER

## 2019-09-26 RX ORDER — PHENYLEPHRINE HCL 2.5 %
1 DROPS OPHTHALMIC (EYE) SEE ADMIN INSTRUCTIONS
Status: DISCONTINUED | OUTPATIENT
Start: 2019-09-26 | End: 2019-09-26 | Stop reason: HOSPADM

## 2019-09-26 RX ORDER — PROPOFOL 10 MG/ML
INJECTION, EMULSION INTRAVENOUS PRN
Status: DISCONTINUED | OUTPATIENT
Start: 2019-09-26 | End: 2019-09-26 | Stop reason: SDUPTHER

## 2019-09-26 RX ORDER — TETRACAINE HYDROCHLORIDE 5 MG/ML
SOLUTION OPHTHALMIC
Status: COMPLETED | OUTPATIENT
Start: 2019-09-26 | End: 2019-09-26

## 2019-09-26 RX ORDER — PHENYLEPHRINE HYDROCHLORIDE 100 MG/ML
SOLUTION/ DROPS OPHTHALMIC
Status: COMPLETED | OUTPATIENT
Start: 2019-09-26 | End: 2019-09-26

## 2019-09-26 RX ORDER — SODIUM CHLORIDE, SODIUM LACTATE, POTASSIUM CHLORIDE, CALCIUM CHLORIDE 600; 310; 30; 20 MG/100ML; MG/100ML; MG/100ML; MG/100ML
INJECTION, SOLUTION INTRAVENOUS CONTINUOUS
Status: DISCONTINUED | OUTPATIENT
Start: 2019-09-26 | End: 2019-09-26 | Stop reason: HOSPADM

## 2019-09-26 RX ORDER — TROPICAMIDE 10 MG/ML
1 SOLUTION/ DROPS OPHTHALMIC SEE ADMIN INSTRUCTIONS
Status: DISCONTINUED | OUTPATIENT
Start: 2019-09-26 | End: 2019-09-26 | Stop reason: HOSPADM

## 2019-09-26 RX ORDER — BETAMETHASONE SODIUM PHOSPHATE AND BETAMETHASONE ACETATE 3; 3 MG/ML; MG/ML
INJECTION, SUSPENSION INTRA-ARTICULAR; INTRALESIONAL; INTRAMUSCULAR; SOFT TISSUE
Status: COMPLETED | OUTPATIENT
Start: 2019-09-26 | End: 2019-09-26

## 2019-09-26 RX ORDER — CYCLOPENTOLATE HYDROCHLORIDE 10 MG/ML
1 SOLUTION/ DROPS OPHTHALMIC SEE ADMIN INSTRUCTIONS
Status: DISCONTINUED | OUTPATIENT
Start: 2019-09-26 | End: 2019-09-26 | Stop reason: HOSPADM

## 2019-09-26 RX ORDER — LIDOCAINE HYDROCHLORIDE 20 MG/ML
INJECTION, SOLUTION INTRAVENOUS PRN
Status: DISCONTINUED | OUTPATIENT
Start: 2019-09-26 | End: 2019-09-26 | Stop reason: SDUPTHER

## 2019-09-26 RX ORDER — SODIUM CHLORIDE 0.9 % (FLUSH) 0.9 %
10 SYRINGE (ML) INJECTION ONCE
Status: DISCONTINUED | OUTPATIENT
Start: 2019-09-26 | End: 2019-09-26 | Stop reason: HOSPADM

## 2019-09-26 RX ORDER — LIDOCAINE HYDROCHLORIDE AND EPINEPHRINE BITARTRATE 20; .01 MG/ML; MG/ML
INJECTION, SOLUTION SUBCUTANEOUS
Status: COMPLETED | OUTPATIENT
Start: 2019-09-26 | End: 2019-09-26

## 2019-09-26 RX ADMIN — SODIUM CHLORIDE, POTASSIUM CHLORIDE, SODIUM LACTATE AND CALCIUM CHLORIDE: 600; 310; 30; 20 INJECTION, SOLUTION INTRAVENOUS at 12:10

## 2019-09-26 RX ADMIN — PHENYLEPHRINE HYDROCHLORIDE 1 DROP: 25 SOLUTION/ DROPS OPHTHALMIC at 11:58

## 2019-09-26 RX ADMIN — DICLOFENAC SODIUM 1 DROP: 1 SOLUTION OPHTHALMIC at 11:41

## 2019-09-26 RX ADMIN — TROPICAMIDE 1 DROP: 10 SOLUTION/ DROPS OPHTHALMIC at 11:48

## 2019-09-26 RX ADMIN — TROPICAMIDE 1 DROP: 10 SOLUTION/ DROPS OPHTHALMIC at 11:58

## 2019-09-26 RX ADMIN — CYCLOPENTOLATE HYDROCHLORIDE 1 DROP: 10 SOLUTION/ DROPS OPHTHALMIC at 12:13

## 2019-09-26 RX ADMIN — CYCLOPENTOLATE HYDROCHLORIDE 1 DROP: 10 SOLUTION/ DROPS OPHTHALMIC at 11:48

## 2019-09-26 RX ADMIN — PROPOFOL 50 MG: 10 INJECTION, EMULSION INTRAVENOUS at 12:44

## 2019-09-26 RX ADMIN — PHENYLEPHRINE HYDROCHLORIDE 1 DROP: 25 SOLUTION/ DROPS OPHTHALMIC at 12:14

## 2019-09-26 RX ADMIN — CYCLOPENTOLATE HYDROCHLORIDE 1 DROP: 10 SOLUTION/ DROPS OPHTHALMIC at 11:58

## 2019-09-26 RX ADMIN — PHENYLEPHRINE HYDROCHLORIDE 1 DROP: 25 SOLUTION/ DROPS OPHTHALMIC at 11:48

## 2019-09-26 RX ADMIN — LIDOCAINE HYDROCHLORIDE 80 MG: 20 INJECTION, SOLUTION INTRAVENOUS at 12:44

## 2019-09-26 RX ADMIN — TROPICAMIDE 1 DROP: 10 SOLUTION/ DROPS OPHTHALMIC at 12:14

## 2019-09-26 RX ADMIN — SODIUM CHLORIDE, POTASSIUM CHLORIDE, SODIUM LACTATE AND CALCIUM CHLORIDE: 600; 310; 30; 20 INJECTION, SOLUTION INTRAVENOUS at 12:35

## 2019-09-26 ASSESSMENT — PULMONARY FUNCTION TESTS
PIF_VALUE: 0
PIF_VALUE: 1
PIF_VALUE: 0
PIF_VALUE: 1
PIF_VALUE: 0
PIF_VALUE: 1
PIF_VALUE: 1
PIF_VALUE: 0
PIF_VALUE: 1
PIF_VALUE: 1
PIF_VALUE: 0

## 2019-09-26 ASSESSMENT — PAIN - FUNCTIONAL ASSESSMENT: PAIN_FUNCTIONAL_ASSESSMENT: 0-10

## 2019-09-26 ASSESSMENT — PAIN SCALES - GENERAL: PAINLEVEL_OUTOF10: 0

## 2019-09-26 NOTE — OP NOTE
Pre Op Dx:  Mixed cataract left eye  Post Op Dx  Mixed cataract left eye  Procedure  Phacoemulsification and posterior lens implantation left eye  Anesthesia  MAC  Narrative: The patient was brought to the OR and anesthesia accomplished with a peribulbar injection of 4ml 2% Lidocaine. The operative eye was then prepped and draped in sterile fashion and a [place] lid speculum inserted. A conjunctival peritomy was performed and hemostasis obtained with cautery. A partial thickness scleral incision was performed superiorly with a crescent blade and bevelled forward to clear cornea. Paracentesis done with 75 blade. Anterior chamber entered with a 2.8 mm keratome through main incision and deepened with viscoelastic. Circular capsulorrhexis performed with cystotome. Hydrodissection carried out with BSS to mobilize nucleus. Nucleus emulsified with  Fabiano Infiniti handpiece using a divide and conquer technique. Cortex removed with I/A handpiece. Capsule and anterior chamber filled with viscoelastic. Fabiano foldable lens was placed in its  and directed to the capsule where it unfolded in the bag. Viscoelastic removed with I/A. Chamber reformed with BSS through paracentesis and both incisions checked and found to be watertight. Conjunctiva reapproximated with cautery. Subtenon's injection of Celestone placed inferiorly. Topical Vigamox applied along with patch and shield. Patient tolerated  procedure well and returned to post op in good condition.

## 2019-11-09 ENCOUNTER — HOSPITAL ENCOUNTER (EMERGENCY)
Age: 69
Discharge: HOME | End: 2019-11-09
Attending: EMERGENCY MEDICINE
Payer: MEDICARE

## 2019-11-09 ENCOUNTER — APPOINTMENT (OUTPATIENT)
Dept: GENERAL RADIOLOGY | Age: 69
End: 2019-11-09
Payer: MEDICARE

## 2019-11-09 VITALS
SYSTOLIC BLOOD PRESSURE: 171 MMHG | HEART RATE: 81 BPM | DIASTOLIC BLOOD PRESSURE: 89 MMHG | TEMPERATURE: 98.4 F | OXYGEN SATURATION: 94 % | HEIGHT: 66 IN | RESPIRATION RATE: 19 BRPM | BODY MASS INDEX: 18.64 KG/M2 | WEIGHT: 116 LBS

## 2019-11-09 DIAGNOSIS — M54.50 ACUTE EXACERBATION OF CHRONIC LOW BACK PAIN: Primary | ICD-10-CM

## 2019-11-09 DIAGNOSIS — G89.29 ACUTE EXACERBATION OF CHRONIC LOW BACK PAIN: Primary | ICD-10-CM

## 2019-11-09 LAB
ALBUMIN SERPL-MCNC: 3.8 GM/DL (ref 3.4–5)
ALP BLD-CCNC: 73 IU/L (ref 40–129)
ALT SERPL-CCNC: 12 U/L (ref 10–40)
ANION GAP SERPL CALCULATED.3IONS-SCNC: 13 MMOL/L (ref 4–16)
AST SERPL-CCNC: 13 IU/L (ref 15–37)
BASOPHILS ABSOLUTE: 0.1 K/CU MM
BASOPHILS RELATIVE PERCENT: 0.5 % (ref 0–1)
BILIRUB SERPL-MCNC: 0.5 MG/DL (ref 0–1)
BUN BLDV-MCNC: 15 MG/DL (ref 6–23)
CALCIUM SERPL-MCNC: 8.7 MG/DL (ref 8.3–10.6)
CHLORIDE BLD-SCNC: 94 MMOL/L (ref 99–110)
CO2: 23 MMOL/L (ref 21–32)
CREAT SERPL-MCNC: 0.4 MG/DL (ref 0.6–1.1)
D DIMER: <200 NG/ML(DDU)
DIFFERENTIAL TYPE: ABNORMAL
EOSINOPHILS ABSOLUTE: 0.1 K/CU MM
EOSINOPHILS RELATIVE PERCENT: 0.5 % (ref 0–3)
GFR AFRICAN AMERICAN: >60 ML/MIN/1.73M2
GFR NON-AFRICAN AMERICAN: >60 ML/MIN/1.73M2
GLUCOSE BLD-MCNC: 121 MG/DL (ref 70–99)
HCT VFR BLD CALC: 38.8 % (ref 37–47)
HEMOGLOBIN: 13.2 GM/DL (ref 12.5–16)
IMMATURE NEUTROPHIL %: 0.5 % (ref 0–0.43)
LYMPHOCYTES ABSOLUTE: 2.7 K/CU MM
LYMPHOCYTES RELATIVE PERCENT: 23.1 % (ref 24–44)
MCH RBC QN AUTO: 32 PG (ref 27–31)
MCHC RBC AUTO-ENTMCNC: 34 % (ref 32–36)
MCV RBC AUTO: 93.9 FL (ref 78–100)
MONOCYTES ABSOLUTE: 1.1 K/CU MM
MONOCYTES RELATIVE PERCENT: 9.4 % (ref 0–4)
PDW BLD-RTO: 13.1 % (ref 11.7–14.9)
PLATELET # BLD: 410 K/CU MM (ref 140–440)
PMV BLD AUTO: 9.6 FL (ref 7.5–11.1)
POTASSIUM SERPL-SCNC: 4.2 MMOL/L (ref 3.5–5.1)
PRO-BNP: 1759 PG/ML
RBC # BLD: 4.13 M/CU MM (ref 4.2–5.4)
SEGMENTED NEUTROPHILS ABSOLUTE COUNT: 7.8 K/CU MM
SEGMENTED NEUTROPHILS RELATIVE PERCENT: 66 % (ref 36–66)
SODIUM BLD-SCNC: 130 MMOL/L (ref 135–145)
TOTAL IMMATURE NEUTOROPHIL: 0.06 K/CU MM
TOTAL PROTEIN: 6.7 GM/DL (ref 6.4–8.2)
TROPONIN T: <0.01 NG/ML
WBC # BLD: 11.8 K/CU MM (ref 4–10.5)

## 2019-11-09 PROCEDURE — 85025 COMPLETE CBC W/AUTO DIFF WBC: CPT

## 2019-11-09 PROCEDURE — 80053 COMPREHEN METABOLIC PANEL: CPT

## 2019-11-09 PROCEDURE — 96375 TX/PRO/DX INJ NEW DRUG ADDON: CPT

## 2019-11-09 PROCEDURE — 6360000002 HC RX W HCPCS: Performed by: EMERGENCY MEDICINE

## 2019-11-09 PROCEDURE — 93005 ELECTROCARDIOGRAM TRACING: CPT | Performed by: EMERGENCY MEDICINE

## 2019-11-09 PROCEDURE — 6370000000 HC RX 637 (ALT 250 FOR IP): Performed by: EMERGENCY MEDICINE

## 2019-11-09 PROCEDURE — 84484 ASSAY OF TROPONIN QUANT: CPT

## 2019-11-09 PROCEDURE — 99284 EMERGENCY DEPT VISIT MOD MDM: CPT

## 2019-11-09 PROCEDURE — 96374 THER/PROPH/DIAG INJ IV PUSH: CPT

## 2019-11-09 PROCEDURE — 85379 FIBRIN DEGRADATION QUANT: CPT

## 2019-11-09 PROCEDURE — 71045 X-RAY EXAM CHEST 1 VIEW: CPT

## 2019-11-09 PROCEDURE — 83880 ASSAY OF NATRIURETIC PEPTIDE: CPT

## 2019-11-09 RX ORDER — HYDROCODONE BITARTRATE AND ACETAMINOPHEN 5; 325 MG/1; MG/1
1 TABLET ORAL EVERY 6 HOURS PRN
Qty: 10 TABLET | Refills: 0 | Status: SHIPPED | OUTPATIENT
Start: 2019-11-09 | End: 2019-11-12

## 2019-11-09 RX ORDER — MORPHINE SULFATE 4 MG/ML
4 INJECTION, SOLUTION INTRAMUSCULAR; INTRAVENOUS EVERY 30 MIN PRN
Status: DISCONTINUED | OUTPATIENT
Start: 2019-11-09 | End: 2019-11-09 | Stop reason: HOSPADM

## 2019-11-09 RX ORDER — CYCLOBENZAPRINE HCL 10 MG
10 TABLET ORAL 2 TIMES DAILY PRN
Qty: 10 TABLET | Refills: 0 | Status: SHIPPED | OUTPATIENT
Start: 2019-11-09 | End: 2019-11-16

## 2019-11-09 RX ORDER — ONDANSETRON 2 MG/ML
4 INJECTION INTRAMUSCULAR; INTRAVENOUS EVERY 30 MIN PRN
Status: DISCONTINUED | OUTPATIENT
Start: 2019-11-09 | End: 2019-11-09 | Stop reason: HOSPADM

## 2019-11-09 RX ORDER — CYCLOBENZAPRINE HCL 10 MG
10 TABLET ORAL ONCE
Status: COMPLETED | OUTPATIENT
Start: 2019-11-09 | End: 2019-11-09

## 2019-11-09 RX ORDER — KETOROLAC TROMETHAMINE 30 MG/ML
15 INJECTION, SOLUTION INTRAMUSCULAR; INTRAVENOUS ONCE
Status: COMPLETED | OUTPATIENT
Start: 2019-11-09 | End: 2019-11-09

## 2019-11-09 RX ORDER — OXYBUTYNIN CHLORIDE 5 MG/1
5 TABLET ORAL 2 TIMES DAILY
COMMUNITY
End: 2021-01-25 | Stop reason: ALTCHOICE

## 2019-11-09 RX ADMIN — ONDANSETRON 4 MG: 2 INJECTION INTRAMUSCULAR; INTRAVENOUS at 19:12

## 2019-11-09 RX ADMIN — MORPHINE SULFATE 4 MG: 4 INJECTION INTRAVENOUS at 19:13

## 2019-11-09 RX ADMIN — CYCLOBENZAPRINE 10 MG: 10 TABLET, FILM COATED ORAL at 19:13

## 2019-11-09 RX ADMIN — KETOROLAC TROMETHAMINE 15 MG: 30 INJECTION, SOLUTION INTRAMUSCULAR; INTRAVENOUS at 19:13

## 2019-11-09 ASSESSMENT — PAIN DESCRIPTION - ORIENTATION: ORIENTATION: MID;LOWER

## 2019-11-09 ASSESSMENT — PAIN DESCRIPTION - PAIN TYPE: TYPE: ACUTE PAIN

## 2019-11-09 ASSESSMENT — PAIN SCALES - GENERAL: PAINLEVEL_OUTOF10: 10

## 2019-11-09 ASSESSMENT — PAIN DESCRIPTION - LOCATION: LOCATION: BACK

## 2019-11-10 PROCEDURE — 93010 ELECTROCARDIOGRAM REPORT: CPT | Performed by: INTERNAL MEDICINE

## 2019-11-12 LAB
EKG ATRIAL RATE: 92 BPM
EKG DIAGNOSIS: NORMAL
EKG P AXIS: 57 DEGREES
EKG P-R INTERVAL: 156 MS
EKG Q-T INTERVAL: 424 MS
EKG QRS DURATION: 142 MS
EKG QTC CALCULATION (BAZETT): 535 MS
EKG R AXIS: 204 DEGREES
EKG T AXIS: 43 DEGREES
EKG VENTRICULAR RATE: 96 BPM

## 2020-02-25 ENCOUNTER — HOSPITAL ENCOUNTER (OUTPATIENT)
Dept: CARDIAC REHAB | Age: 70
Discharge: HOME | End: 2020-02-25
Payer: MEDICARE

## 2020-02-25 PROCEDURE — 94761 N-INVAS EAR/PLS OXIMETRY MLT: CPT

## 2020-02-25 NOTE — PROGRESS NOTES
Patient ambulated with cane in hallway on room air with Sao2 remaining 96-98% for the entire walk. Tolerated fair with Moderate shortness of breath noted.

## 2020-08-18 ENCOUNTER — TELEPHONE (OUTPATIENT)
Dept: CARDIOLOGY CLINIC | Age: 70
End: 2020-08-18

## 2020-09-03 RX ORDER — CARVEDILOL 6.25 MG/1
6.25 TABLET ORAL 2 TIMES DAILY WITH MEALS
Qty: 180 TABLET | Refills: 3 | Status: SHIPPED | OUTPATIENT
Start: 2020-09-03 | End: 2020-10-28 | Stop reason: SDUPTHER

## 2020-10-28 ENCOUNTER — TELEMEDICINE (OUTPATIENT)
Dept: CARDIOLOGY CLINIC | Age: 70
End: 2020-10-28
Payer: MEDICARE

## 2020-10-28 PROCEDURE — 1123F ACP DISCUSS/DSCN MKR DOCD: CPT | Performed by: INTERNAL MEDICINE

## 2020-10-28 PROCEDURE — 1036F TOBACCO NON-USER: CPT | Performed by: INTERNAL MEDICINE

## 2020-10-28 PROCEDURE — G8400 PT W/DXA NO RESULTS DOC: HCPCS | Performed by: INTERNAL MEDICINE

## 2020-10-28 PROCEDURE — G8420 CALC BMI NORM PARAMETERS: HCPCS | Performed by: INTERNAL MEDICINE

## 2020-10-28 PROCEDURE — 3017F COLORECTAL CA SCREEN DOC REV: CPT | Performed by: INTERNAL MEDICINE

## 2020-10-28 PROCEDURE — 99214 OFFICE O/P EST MOD 30 MIN: CPT | Performed by: INTERNAL MEDICINE

## 2020-10-28 PROCEDURE — 1090F PRES/ABSN URINE INCON ASSESS: CPT | Performed by: INTERNAL MEDICINE

## 2020-10-28 PROCEDURE — G8427 DOCREV CUR MEDS BY ELIG CLIN: HCPCS | Performed by: INTERNAL MEDICINE

## 2020-10-28 PROCEDURE — 4040F PNEUMOC VAC/ADMIN/RCVD: CPT | Performed by: INTERNAL MEDICINE

## 2020-10-28 PROCEDURE — G8484 FLU IMMUNIZE NO ADMIN: HCPCS | Performed by: INTERNAL MEDICINE

## 2020-10-28 RX ORDER — ISOSORBIDE MONONITRATE 60 MG/1
30 TABLET, EXTENDED RELEASE ORAL DAILY
Qty: 30 TABLET | Refills: 2 | Status: SHIPPED | OUTPATIENT
Start: 2020-10-28 | End: 2021-03-22 | Stop reason: SDUPTHER

## 2020-10-28 RX ORDER — LISINOPRIL 5 MG/1
5 TABLET ORAL DAILY
Qty: 90 TABLET | Refills: 3 | Status: SHIPPED | OUTPATIENT
Start: 2020-10-28 | End: 2020-11-12

## 2020-10-28 RX ORDER — ATORVASTATIN CALCIUM 40 MG/1
40 TABLET, FILM COATED ORAL DAILY
Qty: 30 TABLET | Refills: 5 | Status: SHIPPED | OUTPATIENT
Start: 2020-10-28 | End: 2021-05-12 | Stop reason: SDUPTHER

## 2020-10-28 RX ORDER — CARVEDILOL 6.25 MG/1
6.25 TABLET ORAL 2 TIMES DAILY WITH MEALS
Qty: 180 TABLET | Refills: 3 | Status: SHIPPED | OUTPATIENT
Start: 2020-10-28 | End: 2020-11-12

## 2020-10-28 NOTE — PROGRESS NOTES
OFFICE PROGRESS NOTES      Sonido Duarte is a 79 y.o. female who has    CHIEF COMPLAINT AS FOLLOWS:  CHEST PAIN: Patient C/O chest pain suggestive of Cardiac ischemia. Sharp, radiates to arms, 3/10. Better with rest. Associated with SOB   SOB:  Has SOB all the time. SOB is worsening. LEG EDEMA: No leg edema   PALPITATIONS:  C/O Palpitations with exertion. DIZZINESS: Off balance while walking   SYNCOPE: None   OTHER:                                     HPI: Patient is here for F/U on her CAD, HTN & Dyslipidemia problems.                     Marc Foster has the following history recorded in care path:  Patient Active Problem List    Diagnosis Date Noted    Ischemic chest pain [facility])      Priority: High    Chronic Hyponatremia 84/52/6155    Diastolic dysfunction without heart failure 11/21/2017    [facility] 11/21/2017    Chest pain 06/08/2017    CAD (coronary artery disease)     COPD, mild ([facility]) 01/25/2017    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    STEMI (ST elevation myocardial infarction) ([facility] 05/16/2015    HTN (hypertension) 05/16/2015    Tobacco abuse 05/16/2015    COPD (chronic obstructive pulmonary disease) ([facility]) 05/16/2015    Asthma 05/16/2015    Mitral regurgitation 05/16/2015     Current Outpatient Medications   Medication Sig Dispense Refill    carvedilol (COREG) 6.25 MG tablet Take 1 tablet by mouth 2 times daily (with meals) 180 tablet 3    isosorbide mononitrate (IMDUR) 60 MG extended release tablet Take 0.5 tablets by mouth daily 30 tablet 2    lisinopril (PRINIVIL;ZESTRIL) 5 MG tablet Take 1 tablet by mouth daily 90 tablet 3    atorvastatin (LIPITOR) 40 MG tablet Take 1 tablet by mouth daily 30 tablet 5    oxybutynin (DITROPAN) 5 MG tablet Take 5 mg by mouth 2 times daily      predniSONE (DELTASONE) 10 MG tablet 10 mg daily       zolpidem (AMBIEN CR) 12.5 MG extended release tablet Take 12.5 mg by mouth nightly as needed. .      albuterol sulfate  (90 Base) MCG/ACT inhaler Inhale 2 puffs into the lungs every 6 hours as needed for Wheezing      montelukast (SINGULAIR) 10 MG tablet Take 10 mg by mouth nightly      Melatonin 10 MG CAPS Take 10 mg by mouth as needed      ipratropium-albuterol (DUONEB) 0.5-2.5 (3) MG/3ML SOLN nebulizer solution Take 3 mLs by nebulization 4 times daily 360 mL 0    budesonide-formoterol (SYMBICORT) 160-4.5 MCG/ACT AERO Inhale 2 puffs into the lungs 2 times daily      docusate sodium (COLACE) 100 MG capsule Take 100 mg by mouth as needed       nitroGLYCERIN (NITROSTAT) 0.4 MG SL tablet Place 1 tablet under the tongue every 5 minutes as needed for Chest pain 25 tablet 3    albuterol-ipratropium (COMBIVENT RESPIMAT)  MCG/ACT AERS inhaler Inhale 1 puff into the lungs 4 times daily 1 Inhaler 5    aspirin 81 MG chewable tablet Take 1 tablet by mouth daily 30 tablet 3    FLUoxetine (PROZAC) 20 MG capsule Take 40 mg by mouth daily      SUMAtriptan (IMITREX) 100 MG tablet Take 100 mg by mouth once as needed for Migraine May repeat in 2 hours in needed       No current facility-administered medications for this visit. Allergies: Seasonal  Past Medical History:   Diagnosis Date    Arthritis     Asthma     CAD (coronary artery disease)     [facility]     H/O Doppler ultrasound (Bilateral Carotid) 04/26/2018    No hemodynamically significant stenosis noted in the internal carotid artery bilaterally.  H/O echocardiogram 03/07/2017    EF 50-55%. Grade II diastolic dysfunction. Mild to Moderate Mitral regurgitation. Mild Tricuspid regurgitation. Moderate pulmonary HTN. No evidence of any pericardial effusion.  History of nuclear stress test 03/07/2017    Normal perfusion study with normal distribution in all coronal, short, and horizontal axis. Normal LV function & wall motion.  LVEF IS 49 %    Hypertension     Mitral regurgitation     S/P CABG x 4 2015    [place] to LAD & Diag, SVG to Post lat RCA & Ramus     Past Surgical History:   Procedure Laterality Date    APPENDECTOMY      CARDIAC SURGERY      HYSTERECTOMY      INTRACAPSULAR CATARACT EXTRACTION Left 2019    EYE CATARACT EMULSIFICATION IOL IMPLANT performed by Erick Sood MD at [place] [address]        As reviewed   Family History   Problem Relation Age of Onset    Other Mother         copd, emphysema    Colon Cancer Sister     Other Brother         copd, agent orange    Other Brother         agent orange     Social History     Tobacco Use    Smoking status: Former Smoker     Packs/day: 0.00     Years: 40.00     Pack years: 0.00     Types: Cigarettes     Last attempt to quit: 5/15/2015     Years since quittin.4    Smokeless tobacco: Never Used   Substance Use Topics    Alcohol use: No     Alcohol/week: 0.0 standard drinks      Review of Systems:    Constitutional: Negative for diaphoresis and fatigue  Psychological:Negative for anxiety or depression  HENT: Negative for headaches, nasal congestion, sinus pain or vertigo  Eyes: Negative for visual disturbance. Endocrine: Negative for polydipsia/polyuria  Respiratory: Negative for shortness of breath  Cardiovascular: Negative for chest pain, dyspnea on exertion, claudication, edema, irregular heartbeat, murmur, palpitations or shortness of breath  Gastrointestinal: Negative for abdominal pain or heartburn  Genito-Urinary: Negative for urinary frequency/urgency  Musculoskeletal: Negative for muscle pain, muscular weakness, negative for pain in arm and leg or swelling in foot and leg  Neurological: Negative for dizziness, headaches, memory loss, numbness/tingling, visual changes, syncope  Dermatological: Negative for rash    Objective:    Patient could not check BP at home.   LMP  (LMP Unknown)   Wt Readings from Last 3 Encounters:   19 116 lb following organ systems was limited: Vitals/Constitutional/EENT/Resp/CV/GI//MS/Neuro/Skin/Heme-Lymph-Imm. Pursuant to the emergency declaration under the [facility], [address] [place] [address] authority and the [employer] Act, this Virtual Visit was conducted with patient's (and/or legal guardian's) consent, to reduce the patient's risk of exposure to COVID-19 and provide necessary medical care. The patient (and/or legal guardian) has also been advised to contact this office for worsening conditions or problems, and seek emergency medical treatment and/or call 911 if deemed necessary. Time spent during this visit 12 min    CAD:Yes   clinically stable. Patient is on optimal medical regimen ( see medication list above )  -     CORONARY ARTERY DISEASE: Patient is currently  ?symptomatic from CAD. Cannot add medication not knowing the BP.           - changes in  treatment:   no           - Testing ordered: Yes. Patient does not want to go to the hospital.  [place] classification: 1  [place] RISK SCORE:  YANA RISK SCORE:  HTN:well controlled on current medical regimen, see list above. - changes in  treatment:   no   CARDIOMYOPATHY: None known   CONGESTIVE HEART FAILURE: NO KNOWN HISTORY.   VHD: No significant VHD noted  DYSLIPIDEMIA: Patient's profile is at / near Goal.yes,                                 HDL is low                                Tolerating current medical regimen wellyes,                                                               See most recent Lab values in Labs section above. OTHER RELEVANT DIAGNOSIS:as noted in patient's active problem list:Exacerbation of COPD, patient to see primary care  TESTS ORDERED: Hakan Starr to be franco at [place] office. All previously ordered tests reviewed. MEDICATIONS: [facility]   Office f/u in six months if test is OK. Primary/secondary prevention is the goal by aggressive risk modification, healthy and therapeutic life style changes for cardiovascular risk reduction.  Various goals are discussed and multiple questions answered.

## 2020-10-28 NOTE — PATIENT INSTRUCTIONS
CAD:Yes   clinically stable. Patient is on optimal medical regimen ( see medication list above )  -     CORONARY ARTERY DISEASE: Patient is currently  ?symptomatic from CAD. Cannot add medication not knowing the BP.           - changes in  treatment:   no           - Testing ordered: Yes. Patient does not want to go to the hospital.  [place] classification: 1  [place] RISK SCORE:  YANA RISK SCORE:  HTN:well controlled on current medical regimen, see list above. - changes in  treatment:   no   CARDIOMYOPATHY: None known   CONGESTIVE HEART FAILURE: NO KNOWN HISTORY.   VHD: No significant VHD noted  DYSLIPIDEMIA: Patient's profile is at / near Goal.yes,                                 HDL is low                                Tolerating current medical regimen wellyes,                                                               See most recent Lab values in Labs section above. OTHER RELEVANT DIAGNOSIS:as noted in patient's active problem list:Exacerbation of COPD, patient to see primary care  TESTS ORDERED: Obey Corona to be franco at [facility] office. All previously ordered tests reviewed. MEDICATIONS: [facility]   Office f/u in six months if test is OK. Primary/secondary prevention is the goal by aggressive risk modification, healthy and therapeutic life style changes for cardiovascular risk reduction. Various goals are discussed and multiple questions answered.

## 2020-11-03 ENCOUNTER — PROCEDURE VISIT (OUTPATIENT)
Dept: CARDIOLOGY CLINIC | Age: 70
End: 2020-11-03
Payer: MEDICARE

## 2020-11-03 LAB
LV EF: 40 %
LVEF MODALITY: NORMAL

## 2020-11-03 PROCEDURE — 93017 CV STRESS TEST TRACING ONLY: CPT | Performed by: INTERNAL MEDICINE

## 2020-11-03 PROCEDURE — 93016 CV STRESS TEST SUPVJ ONLY: CPT | Performed by: INTERNAL MEDICINE

## 2020-11-03 PROCEDURE — 78452 HT MUSCLE IMAGE SPECT MULT: CPT | Performed by: INTERNAL MEDICINE

## 2020-11-03 PROCEDURE — 93018 CV STRESS TEST I&R ONLY: CPT | Performed by: INTERNAL MEDICINE

## 2020-11-03 PROCEDURE — A9500 TC99M SESTAMIBI: HCPCS | Performed by: INTERNAL MEDICINE

## 2020-11-04 ENCOUNTER — TELEPHONE (OUTPATIENT)
Dept: CARDIOLOGY CLINIC | Age: 70
End: 2020-11-04

## 2020-11-04 NOTE — TELEPHONE ENCOUNTER
Normal perfusion study with normal distribution in all coronal, short, and     horizontal axis.  LVEF is low probably due to PVCs     Supervising physician Dr. Lashay Guan .          Recommendation     Recommendations: Schedule out patient visit to discuss results. Pt notified of Stress test results and need of f/u to discuss results; pt voiced understanding and scheduled.

## 2020-11-12 ENCOUNTER — TELEMEDICINE (OUTPATIENT)
Dept: CARDIOLOGY CLINIC | Age: 70
End: 2020-11-12
Payer: MEDICARE

## 2020-11-12 PROCEDURE — 99213 OFFICE O/P EST LOW 20 MIN: CPT | Performed by: INTERNAL MEDICINE

## 2020-11-12 PROCEDURE — 4040F PNEUMOC VAC/ADMIN/RCVD: CPT | Performed by: INTERNAL MEDICINE

## 2020-11-12 PROCEDURE — G8400 PT W/DXA NO RESULTS DOC: HCPCS | Performed by: INTERNAL MEDICINE

## 2020-11-12 PROCEDURE — 1090F PRES/ABSN URINE INCON ASSESS: CPT | Performed by: INTERNAL MEDICINE

## 2020-11-12 PROCEDURE — 1123F ACP DISCUSS/DSCN MKR DOCD: CPT | Performed by: INTERNAL MEDICINE

## 2020-11-12 PROCEDURE — G8427 DOCREV CUR MEDS BY ELIG CLIN: HCPCS | Performed by: INTERNAL MEDICINE

## 2020-11-12 PROCEDURE — 3017F COLORECTAL CA SCREEN DOC REV: CPT | Performed by: INTERNAL MEDICINE

## 2020-11-12 RX ORDER — CARVEDILOL 12.5 MG/1
12.5 TABLET ORAL 2 TIMES DAILY WITH MEALS
Qty: 60 TABLET | Refills: 5 | Status: SHIPPED | OUTPATIENT
Start: 2020-11-12 | End: 2021-06-07

## 2020-11-12 RX ORDER — LISINOPRIL 10 MG/1
10 TABLET ORAL DAILY
Qty: 30 TABLET | Refills: 5 | Status: SHIPPED | OUTPATIENT
Start: 2020-11-12 | End: 2020-12-14

## 2020-11-12 NOTE — LETTER
[facility]  [address] [address]  Phone: [phone]  Fax: [phone]    Lara Maynard MD        November 12, 2020     Gabe Rasmussen, APRN - [facility]   [address] [address]    Patient: Belia Corona  MR Number: [id number]  YOB: 1950  Date of Visit: 11/12/2020    Dear Dr. Gabe Rasmussen:    Thank you for the request for consultation for Belia Corona to me for the evaluation of HTN. Below are the relevant portions of my assessment and plan of care. If you have questions, please do not hesitate to call me. I look forward to following Gini Hendrickson along with you.     Sincerely,        Lara Maynard MD

## 2020-11-12 NOTE — PROGRESS NOTES
OFFICE PROGRESS NOTES      Karl Jolly is a 79 y.o. female who has    CHIEF COMPLAINT AS FOLLOWS:  CHEST PAIN: No C/O chest pain this time. SOB :  C/O worsening SOB. LEG EDEMA: No leg edema   PALPITATIONS:  C/O Palpitations with exertion. As before & lasts for couple of seconds. DIZZINESS: No C/O   SYNCOPE: None   OTHER:                                     HPI: Patient is here for F/U on her CAD, HTN & Dyslipidemia problems. Yariel Jackson has the following history recorded in care path:  Patient Active Problem List    Diagnosis Date Noted    Ischemic chest pain [facility])      Priority: High    Chronic Hyponatremia 57/70/1775    Diastolic dysfunction without heart failure 11/21/2017    Pulmonary HTN ([facility] [address] 11/21/2017    Chest pain 06/08/2017    CAD (coronary artery disease)     COPD, mild ([facility]) 01/25/2017    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    STEMI (ST elevation myocardial infarction) ([facility]) 05/16/2015    HTN (hypertension) 05/16/2015    Tobacco abuse 05/16/2015    Asthma 05/16/2015    Mitral regurgitation 05/16/2015     Current Outpatient Medications   Medication Sig Dispense Refill    carvedilol (COREG) 6.25 MG tablet Take 1 tablet by mouth 2 times daily (with meals) 180 tablet 3    isosorbide mononitrate (IMDUR) 60 MG extended release tablet Take 0.5 tablets by mouth daily 30 tablet 2    lisinopril (PRINIVIL;ZESTRIL) 5 MG tablet Take 1 tablet by mouth daily 90 tablet 3    atorvastatin (LIPITOR) 40 MG tablet Take 1 tablet by mouth daily 30 tablet 5    oxybutynin (DITROPAN) 5 MG tablet Take 5 mg by mouth 2 times daily      predniSONE (DELTASONE) 10 MG tablet 10 mg daily       zolpidem (AMBIEN CR) 12.5 MG extended release tablet Take 12.5 mg by mouth nightly as needed.  .      albuterol sulfate  (90 Base) MCG/ACT inhaler Inhale 2 puffs into the lungs every 6 hours as needed for Wheezing      montelukast (SINGULAIR) 10 MG tablet Take 10 mg by mouth nightly      Melatonin 10 MG CAPS Take 10 mg by mouth as needed      ipratropium-albuterol (DUONEB) 0.5-2.5 (3) MG/3ML SOLN nebulizer solution Take 3 mLs by nebulization 4 times daily 360 mL 0    budesonide-formoterol (SYMBICORT) 160-4.5 MCG/ACT AERO Inhale 2 puffs into the lungs 2 times daily      docusate sodium (COLACE) 100 MG capsule Take 100 mg by mouth as needed       nitroGLYCERIN (NITROSTAT) 0.4 MG SL tablet Place 1 tablet under the tongue every 5 minutes as needed for Chest pain 25 tablet 3    albuterol-ipratropium (COMBIVENT RESPIMAT)  MCG/ACT AERS inhaler Inhale 1 puff into the lungs 4 times daily 1 Inhaler 5    aspirin 81 MG chewable tablet Take 1 tablet by mouth daily 30 tablet 3    FLUoxetine (PROZAC) 20 MG capsule Take 40 mg by mouth daily      SUMAtriptan (IMITREX) 100 MG tablet Take 100 mg by mouth once as needed for Migraine May repeat in 2 hours in needed       No current facility-administered medications for this visit. Allergies: Seasonal  Past Medical History:   Diagnosis Date    Arthritis     Asthma     CAD (coronary artery disease)     COPD (chronic obstructive pulmonary disease) ([facility] [address]     H/O Doppler ultrasound (Bilateral Carotid) 04/26/2018    No hemodynamically significant stenosis noted in the internal carotid artery bilaterally.  H/O echocardiogram 03/07/2017    EF 50-55%. Grade II diastolic dysfunction. Mild to Moderate Mitral regurgitation. Mild Tricuspid regurgitation. Moderate pulmonary HTN. No evidence of any pericardial effusion.  History of nuclear stress test 03/07/2017; 11/3/2020    LVEF is low, EF 40%. Normal perfusion study.     Hypertension     Mitral regurgitation     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    [place] to LAD & Diag, SVG to Post lat RCA & Ramus     Past Surgical History:   Procedure Laterality Date    APPENDECTOMY      CARDIAC SURGERY      HYSTERECTOMY      INTRACAPSULAR CATARACT EXTRACTION Left 9/26/2019    EYE CATARACT EMULSIFICATION IOL IMPLANT performed by Lori Dubon MD at [place] [address]        As reviewed   Family History   Problem Relation Age of Onset    Other Mother         copd, emphysema    Colon Cancer Sister     Other Brother         copd, agent orange    Other Brother         agent orange     Social History     Tobacco Use    Smoking status: Former Smoker     Packs/day: 0.00     Years: 40.00     Pack years: 0.00     Types: Cigarettes     Last attempt to quit: 5/15/2015     Years since quittin.5    Smokeless tobacco: Never Used   Substance Use Topics    Alcohol use: No     Alcohol/week: 0.0 standard drinks      Review of Systems:    Constitutional: Negative for diaphoresis and fatigue  Psychological:Negative for anxiety or depression  HENT: Negative for headaches, nasal congestion, sinus pain or vertigo  Eyes: Negative for visual disturbance. Endocrine: Negative for polydipsia/polyuria  Respiratory: Negative for shortness of breath  Cardiovascular: Negative for chest pain, dyspnea on exertion, claudication, edema, irregular heartbeat, murmur, palpitations or shortness of breath  Gastrointestinal: Negative for abdominal pain or heartburn  Genito-Urinary: Negative for urinary frequency/urgency  Musculoskeletal: Negative for muscle pain, muscular weakness, negative for pain in arm and leg or swelling in foot and leg  Neurological: Negative for dizziness, headaches, memory loss, numbness/tingling, visual changes, syncope  Dermatological: Negative for rash    Objective:  LMP  (LMP Unknown)   Wt Readings from Last 3 Encounters:   19 116 lb (52.6 kg)   19 116 lb (52.6 kg)   10/18/18 123 lb (55.8 kg)       Patient-Reported Vitals 2020   Patient-Reported Weight -   Patient-Reported Height -   Patient-Reported Systolic 657   Patient-Reported Diastolic 61   Patient-Reported Pulse 89         GENERAL - Alert, oriented, pleasant, in no apparent distress.   EYES: No jaundice, no conjunctival pallor. SKIN: It is warm & dry. No rashes. No Echhymosis    HEENT - No clinically significant abnormalities seen. Neck - Supple. No jugular venous distention noted. No carotid bruits. Cardiovascular - Normal S1 and S2 without obvious murmur or gallop. Extremities - No cyanosis, clubbing, or significant edema. Pulmonary - No respiratory distress. No wheezes or rales. Abdomen - No masses, tenderness, or organomegaly. Musculoskeletal - No significant edema. No joint deformities. No muscle wasting. Neurologic - Cranial nerves II through XII are grossly intact. There were no gross focal neurologic abnormalities.     Lab Review   Lab Results   Component Value Date    TROPONINT <0.010 11/09/2019    TROPONINT <0.010 09/17/2018     BNP:    Lab Results   Component Value Date    PROBNP 1,759 11/09/2019    PROBNP 795.7 11/21/2017     PT/INR:    Lab Results   Component Value Date    INR 1.06 06/08/2017    INR 1.14 06/01/2017     Lab Results   Component Value Date    LABA1C 6.0 05/16/2015     Lab Results   Component Value Date    WBC 11.8 (H) 11/09/2019    WBC 15.8 (H) 09/17/2018    HCT 38.8 11/09/2019    HCT 39.8 09/17/2018    MCV 93.9 11/09/2019    MCV 95.7 09/17/2018     11/09/2019     09/17/2018     Lab Results   Component Value Date    CHOL 121 06/08/2017    TRIG 65 06/08/2017    HDL 32 (L) 04/13/2018    HDL 26 (L) 06/08/2017    LDLDIRECT 98 04/13/2018    LDLDIRECT 86 06/08/2017     Lab Results   Component Value Date    ALT 12 11/09/2019    ALT 21 09/17/2018    AST 13 (L) 11/09/2019    AST 16 09/17/2018     BMP:    Lab Results   Component Value Date     11/09/2019     09/17/2018    K 4.2 11/09/2019    K 3.7 09/17/2018    CL 94 11/09/2019    CL 86 09/17/2018    CO2 23 11/09/2019    CO2 26 09/17/2018    BUN 15 11/09/2019    BUN 9 09/17/2018    CREATININE 0.4 11/09/2019    CREATININE 0.5 09/17/2018     CMP:   Lab Results   Component Value Date     11/09/2019     09/17/2018    K 4.2 11/09/2019    K 3.7 09/17/2018    CL 94 11/09/2019    CL 86 09/17/2018    CO2 23 11/09/2019    CO2 26 09/17/2018    BUN 15 11/09/2019    BUN 9 09/17/2018    CREATININE 0.4 11/09/2019    CREATININE 0.5 09/17/2018    PROT 6.7 11/09/2019    PROT 7.1 09/17/2018    PROT 7.2 10/20/2011     TSH:    Lab Results   Component Value Date    TSH 2.510 04/13/2018       QUALITY MEASURES REVIEWED:  1.CAD:Patient is taking anti platelet agent:Yes  2. DYSLIPIDEMIA: Patient is on cholesterol lowering medication:Yes  3. Beta-Blocker therapy for CAD, if prior Myocardial Infarction:Yes  4. Atrial fibrillation & anticoagulation therapy No    Impression:    No diagnosis found. Patient Active Problem List   Diagnosis Code    STEMI (ST elevation myocardial infarction) ([facility] [address] I21.3    HTN (hypertension) I10    Tobacco abuse Z72.0    Asthma J45.909    Mitral regurgitation I34.0    COPD, mild (HCC) J44.9    S/P CABG x 4 Z95.1    CAD (coronary artery disease) I25.10    Ischemic chest pain (HCC) I20.9    Chest pain R07.9    Chronic Hyponatremia I67.4    Diastolic dysfunction without heart failure I51.89    [facility] I27.20       Assessment & Plan:  being evaluated by a Virtual Visit (video visit) encounter to address concerns as mentioned above. A caregiver was present when appropriate. Due to this being a TeleHealth encounter (During REZYH-60 public health emergency), evaluation of the following organ systems was limited: Vitals/Constitutional/EENT/Resp/CV/GI//MS/Neuro/Skin/Heme-Lymph-Imm. Pursuant to the emergency declaration under the [address] [place] [address], [address] [place] [address] authority and the [employer] Act, this Virtual Visit was conducted with patient's (and/or legal guardian's) consent, to reduce the patient's risk of exposure to COVID-19 and provide necessary medical care.   The patient (and/or legal guardian) has also been advised to contact this office for worsening conditions or problems, and seek emergency medical treatment and/or call 911 if deemed necessary. Time spent during this visit 12 min       CAD:Yes   clinically stable. Patient is on optimal medical regimen ( see medication list above )  - Jen Luna is currently  asymptomatic from CAD.          - changes in  treatment:   no           - Testing ordered: Yes. Patient does not want to go to the hospital.  [place] classification: 1  [place] RISK SCORE:  [place] RISK SCORE:  HTN:Not well controlled on current medical regimen, see list above.            - changes in  treatment: Yes   CARDIOMYOPATHY: None known   CONGESTIVE HEART FAILURE: NO KNOWN HISTORY.   VHD: No significant VHD noted  DYSLIPIDEMIA: Patient's profile is at / near [facility] is low                                Tolerating current medical regimen wellyes,                                                               See most recent Lab values in Labs section above. OTHER RELEVANT DIAGNOSIS:as noted in patient's active problem list:Exacerbation of COPD, patient to see primary care  TESTS ORDERED: Echo as nuclear test did not give accurate LVEF. Has worsening SOB                                   All previously ordered tests reviewed.  MEDICATIONS: Increase Coreg to 12.5 mg BID & Lisinopril to 10 mg daily   Office f/u in six months if test is OK. Primary/secondary prevention is the goal by aggressive risk modification, healthy and therapeutic life style changes for cardiovascular risk reduction.  Various goals are discussed and multiple questions answered.

## 2020-11-12 NOTE — PATIENT INSTRUCTIONS
CAD:Yes   clinically stable. Patient is on optimal medical regimen ( see medication list above )  - Bola Gaines is currently  asymptomatic from CAD.          - changes in  treatment:   no           - Testing ordered: Yes. Patient does not want to go to the hospital.  [place] classification: 1  [place] RISK SCORE:  [place] RISK SCORE:  HTN:Not well controlled on current medical regimen, see list above.            - changes in  treatment: Yes   CARDIOMYOPATHY: None known   CONGESTIVE HEART FAILURE: NO KNOWN HISTORY.   VHD: No significant VHD noted  DYSLIPIDEMIA: Patient's profile is at / near [employer] [facility] is low                                Tolerating current medical regimen wellyes,                                                               See most recent Lab values in Labs section above. OTHER RELEVANT DIAGNOSIS:as noted in patient's active problem list:Exacerbation of COPD, patient to see primary care  TESTS ORDERED: Echo as nuclear test did not give accurate LVEF.                                  All previously ordered tests reviewed.  MEDICATIONS: Increase Coreg to 12.5 mg BID & Lisinopril to 10 mg daily   Office f/u in six months if test is OK. Primary/secondary prevention is the goal by aggressive risk modification, healthy and therapeutic life style changes for cardiovascular risk reduction. Various goals are discussed and multiple questions answered. Please hold on to these instructions the  will call you within 1-9 business days when we receive authorization from your insurance. Echocardiogram    WHAT TO EXPECT:   ? This test will take approximately 45 minutes. ? It is an ultrasound of the heart. ? It can look at the valves and chambers inside the heart   ? There is no special instructions for this test.     If you are unable to keep this appointment, please notify us 24 hours prior to test at [phone].

## 2020-12-08 ENCOUNTER — PROCEDURE VISIT (OUTPATIENT)
Dept: CARDIOLOGY CLINIC | Age: 70
End: 2020-12-08
Payer: MEDICARE

## 2020-12-08 LAB
LV EF: 38 %
LVEF MODALITY: NORMAL

## 2020-12-08 PROCEDURE — 93306 TTE W/DOPPLER COMPLETE: CPT | Performed by: INTERNAL MEDICINE

## 2020-12-09 ENCOUNTER — TELEPHONE (OUTPATIENT)
Dept: CARDIOLOGY CLINIC | Age: 70
End: 2020-12-09

## 2020-12-14 ENCOUNTER — APPOINTMENT (OUTPATIENT)
Dept: GENERAL RADIOLOGY | Age: 70
End: 2020-12-14
Payer: MEDICARE

## 2020-12-14 ENCOUNTER — HOSPITAL ENCOUNTER (EMERGENCY)
Age: 70
Discharge: HOME | End: 2020-12-14
Attending: EMERGENCY MEDICINE
Payer: MEDICARE

## 2020-12-14 ENCOUNTER — TELEMEDICINE (OUTPATIENT)
Dept: CARDIOLOGY CLINIC | Age: 70
End: 2020-12-14
Payer: MEDICARE

## 2020-12-14 VITALS
SYSTOLIC BLOOD PRESSURE: 168 MMHG | HEART RATE: 75 BPM | BODY MASS INDEX: 16.55 KG/M2 | RESPIRATION RATE: 17 BRPM | TEMPERATURE: 98.1 F | WEIGHT: 103 LBS | OXYGEN SATURATION: 93 % | DIASTOLIC BLOOD PRESSURE: 88 MMHG | HEIGHT: 66 IN

## 2020-12-14 LAB
ALBUMIN SERPL-MCNC: 4 GM/DL (ref 3.4–5)
ALP BLD-CCNC: 51 IU/L (ref 40–129)
ALT SERPL-CCNC: 14 U/L (ref 10–40)
ANION GAP SERPL CALCULATED.3IONS-SCNC: 2 MMOL/L (ref 4–16)
AST SERPL-CCNC: 19 IU/L (ref 15–37)
BASOPHILS ABSOLUTE: 0.1 K/CU MM
BASOPHILS RELATIVE PERCENT: 1.1 % (ref 0–1)
BILIRUB SERPL-MCNC: 0.8 MG/DL (ref 0–1)
BUN BLDV-MCNC: 16 MG/DL (ref 6–23)
CALCIUM SERPL-MCNC: 9.3 MG/DL (ref 8.3–10.6)
CHLORIDE BLD-SCNC: 95 MMOL/L (ref 99–110)
CO2: 33 MMOL/L (ref 21–32)
CREAT SERPL-MCNC: 0.4 MG/DL (ref 0.6–1.1)
DIFFERENTIAL TYPE: ABNORMAL
EOSINOPHILS ABSOLUTE: 1.2 K/CU MM
EOSINOPHILS RELATIVE PERCENT: 9 % (ref 0–3)
GFR AFRICAN AMERICAN: >60 ML/MIN/1.73M2
GFR NON-AFRICAN AMERICAN: >60 ML/MIN/1.73M2
GLUCOSE BLD-MCNC: 105 MG/DL (ref 70–99)
HCT VFR BLD CALC: 45.3 % (ref 37–47)
HEMOGLOBIN: 14.7 GM/DL (ref 12.5–16)
IMMATURE NEUTROPHIL %: 0.3 % (ref 0–0.43)
LYMPHOCYTES ABSOLUTE: 3 K/CU MM
LYMPHOCYTES RELATIVE PERCENT: 23 % (ref 24–44)
MCH RBC QN AUTO: 31.4 PG (ref 27–31)
MCHC RBC AUTO-ENTMCNC: 32.5 % (ref 32–36)
MCV RBC AUTO: 96.8 FL (ref 78–100)
MONOCYTES ABSOLUTE: 1 K/CU MM
MONOCYTES RELATIVE PERCENT: 7.9 % (ref 0–4)
PDW BLD-RTO: 12.8 % (ref 11.7–14.9)
PLATELET # BLD: 315 K/CU MM (ref 140–440)
PMV BLD AUTO: 9.9 FL (ref 7.5–11.1)
POTASSIUM SERPL-SCNC: 5.1 MMOL/L (ref 3.5–5.1)
PRO-BNP: 2481 PG/ML
RBC # BLD: 4.68 M/CU MM (ref 4.2–5.4)
SEGMENTED NEUTROPHILS ABSOLUTE COUNT: 7.7 K/CU MM
SEGMENTED NEUTROPHILS RELATIVE PERCENT: 58.7 % (ref 36–66)
SODIUM BLD-SCNC: 130 MMOL/L (ref 135–145)
TOTAL IMMATURE NEUTOROPHIL: 0.04 K/CU MM
TOTAL PROTEIN: 6.5 GM/DL (ref 6.4–8.2)
TROPONIN T: <0.01 NG/ML
WBC # BLD: 13.1 K/CU MM (ref 4–10.5)

## 2020-12-14 PROCEDURE — 71045 X-RAY EXAM CHEST 1 VIEW: CPT

## 2020-12-14 PROCEDURE — 6370000000 HC RX 637 (ALT 250 FOR IP): Performed by: EMERGENCY MEDICINE

## 2020-12-14 PROCEDURE — G8400 PT W/DXA NO RESULTS DOC: HCPCS | Performed by: INTERNAL MEDICINE

## 2020-12-14 PROCEDURE — 83880 ASSAY OF NATRIURETIC PEPTIDE: CPT

## 2020-12-14 PROCEDURE — 1090F PRES/ABSN URINE INCON ASSESS: CPT | Performed by: INTERNAL MEDICINE

## 2020-12-14 PROCEDURE — 99285 EMERGENCY DEPT VISIT HI MDM: CPT

## 2020-12-14 PROCEDURE — 93005 ELECTROCARDIOGRAM TRACING: CPT | Performed by: EMERGENCY MEDICINE

## 2020-12-14 PROCEDURE — 3017F COLORECTAL CA SCREEN DOC REV: CPT | Performed by: INTERNAL MEDICINE

## 2020-12-14 PROCEDURE — 93010 ELECTROCARDIOGRAM REPORT: CPT | Performed by: INTERNAL MEDICINE

## 2020-12-14 PROCEDURE — 1123F ACP DISCUSS/DSCN MKR DOCD: CPT | Performed by: INTERNAL MEDICINE

## 2020-12-14 PROCEDURE — G8427 DOCREV CUR MEDS BY ELIG CLIN: HCPCS | Performed by: INTERNAL MEDICINE

## 2020-12-14 PROCEDURE — U0002 COVID-19 LAB TEST NON-CDC: HCPCS

## 2020-12-14 PROCEDURE — 99213 OFFICE O/P EST LOW 20 MIN: CPT | Performed by: INTERNAL MEDICINE

## 2020-12-14 PROCEDURE — 80053 COMPREHEN METABOLIC PANEL: CPT

## 2020-12-14 PROCEDURE — 84484 ASSAY OF TROPONIN QUANT: CPT

## 2020-12-14 PROCEDURE — 85025 COMPLETE CBC W/AUTO DIFF WBC: CPT

## 2020-12-14 PROCEDURE — 4040F PNEUMOC VAC/ADMIN/RCVD: CPT | Performed by: INTERNAL MEDICINE

## 2020-12-14 RX ORDER — DOXYCYCLINE HYCLATE 100 MG
100 TABLET ORAL ONCE
Status: COMPLETED | OUTPATIENT
Start: 2020-12-14 | End: 2020-12-14

## 2020-12-14 RX ORDER — PREDNISONE 20 MG/1
60 TABLET ORAL ONCE
Status: COMPLETED | OUTPATIENT
Start: 2020-12-14 | End: 2020-12-14

## 2020-12-14 RX ORDER — DOXYCYCLINE HYCLATE 100 MG
100 TABLET ORAL 2 TIMES DAILY
Qty: 20 TABLET | Refills: 0 | Status: SHIPPED | OUTPATIENT
Start: 2020-12-14 | End: 2020-12-24

## 2020-12-14 RX ORDER — DOXYCYCLINE HYCLATE 100 MG
100 TABLET ORAL EVERY 12 HOURS SCHEDULED
Status: DISCONTINUED | OUTPATIENT
Start: 2020-12-14 | End: 2020-12-14

## 2020-12-14 RX ORDER — GUAIFENESIN/DEXTROMETHORPHAN 100-10MG/5
5 SYRUP ORAL ONCE
Status: COMPLETED | OUTPATIENT
Start: 2020-12-14 | End: 2020-12-14

## 2020-12-14 RX ORDER — PREDNISONE 10 MG/1
60 TABLET ORAL DAILY
Qty: 30 TABLET | Refills: 0 | Status: SHIPPED | OUTPATIENT
Start: 2020-12-14 | End: 2020-12-19

## 2020-12-14 RX ORDER — LISINOPRIL 20 MG/1
20 TABLET ORAL DAILY
Qty: 30 TABLET | Refills: 5 | Status: SHIPPED | OUTPATIENT
Start: 2020-12-14 | End: 2021-03-08 | Stop reason: DRUGHIGH

## 2020-12-14 RX ADMIN — PREDNISONE 60 MG: 20 TABLET ORAL at 16:21

## 2020-12-14 RX ADMIN — GUAIFENESIN AND DEXTROMETHORPHAN 5 ML: 100; 10 SYRUP ORAL at 14:23

## 2020-12-14 RX ADMIN — DOXYCYCLINE HYCLATE 100 MG: 100 TABLET, COATED ORAL at 16:21

## 2020-12-14 ASSESSMENT — ENCOUNTER SYMPTOMS
VOMITING: 1
SHORTNESS OF BREATH: 0
RHINORRHEA: 0
NAUSEA: 0
EYE REDNESS: 0
DIARRHEA: 0
CONSTIPATION: 0
ABDOMINAL PAIN: 0
SORE THROAT: 0
BACK PAIN: 0
COUGH: 1

## 2020-12-14 ASSESSMENT — PAIN DESCRIPTION - PAIN TYPE: TYPE: ACUTE PAIN

## 2020-12-14 ASSESSMENT — PAIN SCALES - GENERAL: PAINLEVEL_OUTOF10: 2

## 2020-12-14 ASSESSMENT — PAIN DESCRIPTION - ORIENTATION: ORIENTATION: LOWER

## 2020-12-14 ASSESSMENT — PAIN DESCRIPTION - LOCATION: LOCATION: ABDOMEN

## 2020-12-14 NOTE — ED PROVIDER NOTES
Triage Chief Complaint:   Hypertension (Was told to come here by cardiologist d/t elevated b/p and heart rhythm \" wasn't right\")    [place]:  John James is a 79 y.o. female that presents after she was told to come to the ER by her doctor. She states she has had an abnormal heartbeat and noted that her blood pressure is elevated. She also has a history of COPD and has had an increased cough recently. She states it just recent became productive of yellow sputum. She denies any fevers or chest pain. She has had a few episodes of posttussive emesis. No nausea. No abdominal pain. She denies any lower extremity swelling or previous history of blood clots. No known sick contacts. She has not recently been started on any new medications. ROS:   Review of Systems   Constitutional: Positive for fatigue. Negative for chills and fever. HENT: Negative for congestion, rhinorrhea and sore throat. Eyes: Negative for redness and visual disturbance. Respiratory: Positive for cough. Negative for shortness of breath. Cardiovascular: Positive for palpitations. Negative for chest pain and leg swelling. Gastrointestinal: Positive for vomiting (post-tussive emesis). Negative for abdominal pain, constipation, diarrhea and nausea. Genitourinary: Negative for dysuria and frequency. Musculoskeletal: Negative for arthralgias and back pain. Skin: Negative for rash and wound. Neurological: Negative for syncope and headaches. Psychiatric/Behavioral: Negative. Negative for hallucinations and suicidal ideas. Past Medical History:   Diagnosis Date    Arthritis     Asthma     CAD (coronary artery disease)     COPD (chronic obstructive pulmonary disease) ([facility] [address]     H/O Doppler ultrasound (Bilateral Carotid) 04/26/2018    No hemodynamically significant stenosis noted in the internal carotid artery bilaterally.     H/O echocardiogram 03/07/2017; 12/8/2020    Left ventricular systolic function is moderately depressed with EF 35-40%. Mild-Moderate Pulmonary HTN.  History of nuclear stress test 2017; 11/3/2020    LVEF is low, EF 40%. Normal perfusion study.     Hyperlipidemia     Hypertension     Mitral regurgitation     S/P CABG x 4 2015    [place] to LAD & Diag, SVG to Post lat RCA & Ramus     Past Surgical History:   Procedure Laterality Date    APPENDECTOMY      CARDIAC SURGERY      HYSTERECTOMY      INTRACAPSULAR CATARACT EXTRACTION Left 2019    EYE CATARACT EMULSIFICATION IOL IMPLANT performed by Tammy De La Garza MD at [facility]      SINUS SURGERY      TONSILLECTOMY       Family History   Problem Relation Age of Onset    Other Mother         copd, emphysema    Colon Cancer Sister     Other Brother         copd, agent orange    Other Brother         agent orange     Social History     Socioeconomic History    Marital status:      Spouse name: Not on file    Number of children: Not on file    Years of education: Not on file    Highest education level: Not on file   Occupational History    Not on file   Social Needs    Financial resource strain: Not on file    Food insecurity     Worry: Not on file     Inability: Not on file    Transportation needs     Medical: Not on file     Non-medical: Not on file   Tobacco Use    Smoking status: Former Smoker     Packs/day: 0.00     Years: 40.00     Pack years: 0.00     Types: Cigarettes     Quit date: 5/15/2015     Years since quittin.6    Smokeless tobacco: Never Used   Substance and Sexual Activity    Alcohol use: No     Alcohol/week: 0.0 standard drinks    Drug use: No    Sexual activity: Yes     Partners: Male   Lifestyle    Physical activity     Days per week: Not on file     Minutes per session: Not on file    Stress: Not on file   Relationships    Social connections     Talks on phone: Not on file     Gets together: Not on file     Attends Rastafarian service: Not on file     Active member of club or organization: Not on file     Attends meetings of clubs or organizations: Not on file     Relationship status: Not on file    Intimate partner violence     Fear of current or ex partner: Not on file     Emotionally abused: Not on file     Physically abused: Not on file     Forced sexual activity: Not on file   Other Topics Concern    Not on file   Social History Narrative    ** Merged History Encounter **          No current facility-administered medications for this encounter.       Current Outpatient Medications   Medication Sig Dispense Refill    lisinopril (PRINIVIL;ZESTRIL) 20 MG tablet Take 1 tablet by mouth daily 30 tablet 5    carvedilol (COREG) 12.5 MG tablet Take 1 tablet by mouth 2 times daily (with meals) 60 tablet 5    isosorbide mononitrate (IMDUR) 60 MG extended release tablet Take 0.5 tablets by mouth daily 30 tablet 2    atorvastatin (LIPITOR) 40 MG tablet Take 1 tablet by mouth daily 30 tablet 5    oxybutynin (DITROPAN) 5 MG tablet Take 5 mg by mouth 2 times daily      predniSONE (DELTASONE) 10 MG tablet 10 mg daily       albuterol sulfate  (90 Base) MCG/ACT inhaler Inhale 2 puffs into the lungs every 6 hours as needed for Wheezing      Melatonin 10 MG CAPS Take 10 mg by mouth as needed      ipratropium-albuterol (DUONEB) 0.5-2.5 (3) MG/3ML SOLN nebulizer solution Take 3 mLs by nebulization 4 times daily 360 mL 0    budesonide-formoterol (SYMBICORT) 160-4.5 MCG/ACT AERO Inhale 2 puffs into the lungs 2 times daily      docusate sodium (COLACE) 100 MG capsule Take 100 mg by mouth as needed       albuterol-ipratropium (COMBIVENT RESPIMAT)  MCG/ACT AERS inhaler Inhale 1 puff into the lungs 4 times daily 1 Inhaler 5    aspirin 81 MG chewable tablet Take 1 tablet by mouth daily 30 tablet 3    FLUoxetine (PROZAC) 20 MG capsule Take 40 mg by mouth daily      SUMAtriptan (IMITREX) 100 MG tablet Take 100 mg by mouth once as needed for Migraine May repeat in 2 hours in needed  predniSONE (DELTASONE) 20 MG tablet Take 1 tablet by mouth 2 times daily for 5 days 10 tablet 0    predniSONE (DELTASONE) 10 MG tablet Take 1 tablet by mouth daily for 10 days 30 tablet 0    montelukast (SINGULAIR) 10 MG tablet Take 1 tablet by mouth nightly 30 tablet 5    busPIRone (BUSPAR) 5 MG tablet Take 1 tablet by mouth 2 times daily 60 tablet 5    doxepin (SINEQUAN) 10 MG capsule Take 1 capsule by mouth nightly 30 capsule 5    nitroGLYCERIN (NITROSTAT) 0.4 MG SL tablet Place 1 tablet under the tongue every 5 minutes as needed for Chest pain 25 tablet 3     Allergies   Allergen Reactions    Seasonal Itching       Nursing Notes Reviewed     Physical Exam:   ED Triage Vitals [12/14/20 1341]   Enc Vitals Group      BP (!) 174/125      Pulse 78      Resp 23      Temp 98.1 °F (36.7 °C)      Temp Source Temporal      SpO2 95 %      Weight 103 lb (46.7 kg)      Height 5' 6\" (1.676 m)      Head Circumference       Peak Flow       Pain Score       Pain Loc       Pain Edu? Excl. in [address]? BP (!) 168/88   Pulse 75   Temp 98.1 °F (36.7 °C) (Temporal)   Resp 17   Ht 5' 6\" (1.676 m)   Wt 103 lb (46.7 kg)   LMP  (LMP Unknown)   SpO2 93%   BMI 16.62 kg/m²   My pulse ox interpretation is - normal  Physical Exam  Vitals signs and nursing note reviewed. Constitutional:       General: She is not in acute distress. Appearance: She is ill-appearing. She is not toxic-appearing or diaphoretic. HENT:      Head: Normocephalic and atraumatic. Eyes:      General:         Right eye: No discharge. Left eye: No discharge. Conjunctiva/sclera: Conjunctivae normal.   Cardiovascular:      Rate and Rhythm: Normal rate and regular rhythm. Pulses: Normal pulses. Radial pulses are 2+ on the right side and 2+ on the left side. Pulmonary:      Effort: Pulmonary effort is normal. No respiratory distress. Breath sounds: Wheezing (diffuse) and rhonchi (diffuse) present. No rales. Abdominal:      General: There is no distension. Tenderness: There is no abdominal tenderness. There is no guarding or rebound. Musculoskeletal: Normal range of motion. General: No swelling or tenderness. Skin:     General: Skin is warm and dry. Neurological:      General: No focal deficit present. Mental Status: She is alert. Cranial Nerves: No cranial nerve deficit.    Psychiatric:         Mood and Affect: Mood normal.         Behavior: Behavior normal.         I have reviewed and interpreted all of the currently available lab results from this visit (if applicable):  Results for orders placed or performed during the hospital encounter of 12/14/20   CBC Auto Differential   Result Value Ref Range    WBC 13.1 (H) 4.0 - 10.5 K/CU MM    RBC 4.68 4.2 - 5.4 M/CU MM    Hemoglobin 14.7 12.5 - 16.0 GM/DL    Hematocrit 45.3 37 - 47 %    MCV 96.8 78 - 100 FL    MCH 31.4 (H) 27 - 31 PG    MCHC 32.5 32.0 - 36.0 %    RDW 12.8 11.7 - 14.9 %    Platelets 436 298 - 024 K/CU MM    MPV 9.9 7.5 - 11.1 FL    Differential Type AUTOMATED DIFFERENTIAL     Segs Relative 58.7 36 - 66 %    Lymphocytes % 23.0 (L) 24 - 44 %    Monocytes % 7.9 (H) 0 - 4 %    Eosinophils % 9.0 (H) 0 - 3 %    Basophils % 1.1 (H) 0 - 1 %    Segs Absolute 7.7 K/CU MM    Lymphocytes Absolute 3.0 K/CU MM    Monocytes Absolute 1.0 K/CU MM    Eosinophils Absolute 1.2 K/CU MM    Basophils Absolute 0.1 K/CU MM    Immature Neutrophil % 0.3 0 - 0.43 %    Total Immature Neutrophil 0.04 K/CU MM   Comprehensive Metabolic Panel w/ Reflex to MG   Result Value Ref Range    Sodium 130 (L) 135 - 145 MMOL/L    Potassium 5.1 3.5 - 5.1 MMOL/L    Chloride 95 (L) 99 - 110 mMol/L    CO2 33 (H) 21 - 32 MMOL/L    BUN 16 6 - 23 MG/DL    CREATININE 0.4 (L) 0.6 - 1.1 MG/DL    Glucose 105 (H) 70 - 99 MG/DL    Calcium 9.3 8.3 - 10.6 MG/DL    Alb 4.0 3.4 - 5.0 GM/DL    Total Protein 6.5 6.4 - 8.2 GM/DL    Total Bilirubin 0.8 0.0 - 1.0 MG/DL    ALT 14 10 - 40 U/L AST 19 15 - 37 IU/L    Alkaline Phosphatase 51 40 - 129 IU/L    GFR Non-African American >60 >60 mL/min/1.73m2    GFR African American >60 >60 mL/min/1.73m2    Anion Gap 2 (L) 4 - 16   Troponin   Result Value Ref Range    Troponin T <0.010 <0.01 NG/ML   Brain Natriuretic Peptide   Result Value Ref Range    Pro-BNP 2,481 (H) <300 PG/ML   Covid-19 Ambulatory   Result Value Ref Range    Source VIRAL SWAB     SARS-CoV-2 NOT DETECTED NOT DETECTED   EKG 12 Lead   Result Value Ref Range    Ventricular Rate 79 BPM    Atrial Rate 79 BPM    P-R Interval 184 ms    QRS Duration 136 ms    Q-T Interval 426 ms    QTc Calculation (Bazett) 488 ms    P Axis 63 degrees    R Axis -73 degrees    T Axis 103 degrees    Diagnosis       Sinus rhythm with frequent premature ventricular complexes  Left axis deviation  Left bundle branch block  Abnormal ECG  When compared with ECG of 09-NOV-2019 18:48,  Questionable change in QRS axis  T wave inversion more evident in Lateral leads  Confirmed by [facility] Lelo HARGROVE ([id number]) on 12/14/2020 6:49:15 PM        Radiographs (if obtained):  [] The following radiograph was interpreted by myself in the absence of a radiologist:  [x]Radiologist's Report Reviewed:  XR CHEST PORTABLE   Final Result   No acute process. Mild COPD               EKG (if obtained): (All EKG's are interpreted by myself in the absence of a cardiologist)  Sinus rhythm with frequent PVCs. Rate of 79. MI interval 184, , QTc 48. No ST elevations or depressions. Nonspecific T waves. Left axis deviation, left bundle branch block. Impression: Abnormal EKG. When compared to previous EKG from 11/9/2019, there are no significant changes. MDM:  Differential diagnoses considered include but are not limited to HF exacerbation, COPD exacerbation, pneumonia, pneumothorax, hypertensive urgency versus emergency, pulmonary edema, COVID-19.     Patient arrives appearing to feel unwell but in no acute distress with normal vital signs other than slightly elevated blood pressure. EKG was obtained is not concerning for acute ischemia or arrhythmia. She is having frequent PVCs. I suspect the palpitations are coming from the PVCs. Basic labs are obtained and show mild leukocytosis and a slightly decreased sodium level but otherwise unremarkable. Troponin is normal.  I do not suspect acute coronary syndrome. She does have a slightly elevated BNP. Chest x-ray was obtained which showed no acute process. She has mild COPD. I do not suspect pulmonary edema is causing her symptoms. I suspect she is having any COPD exacerbation. We will treat her with steroids and doxycycline. Given the current pandemic will also test patient for COVID-19. She has maintained oxygen saturations greater than 90% throughout her stay in the emergency department. I believe it is reasonable to discharge patient home. Recommended she isolate until results of the Covid test are known. She was given strict return precautions. I did recommend she follow-up closely with her primary care physician. Plan of care explained to patient. Concerning signs and symptoms warranting a return visit to the Emergency Department were explained in detail. All questions and concerns were addressed to the patient's satisfaction. Patient understood and agreed with plan. I did don appropriate PPE (including N95 face mask, protective eye ware/safety glasses, gloves, hair covering, and no isolation gown), as recommended by the health facility/national standard best practice, during my bedside interactions with the patient. The likelihood of other entities in the differential is insufficient to justify any further testing for them. This was explained to the patient. The patient was advised that persistent or worsening symptoms would requirefurther evaluation. Clinical Impression:  1. COPD exacerbation ([address]    2. Cough    3.  Essential hypertension          Ca Montano MD       Please note that portions of this note may have been complete with a voice recognition program.  Effortswere made to edit the dictations, but occasional words are mis-transcribed.           Gloria Reynoso MD  01/09/21 2382

## 2020-12-14 NOTE — PATIENT INSTRUCTIONS
CAD:Yes   clinically stable. Patient is on optimal medical regimen ( see medication list above )  - [address] is currently  ?symptomatic from CAD. Worsening SOB           - changes in  treatment:   no           - Testing ordered: no but she might need a cath for further risk stratification. [place] classification: 1  [place] RISK SCORE:  [place] RISK SCORE:  HTN:Not well controlled on current medical regimen, see list above.            - changes in  treatment: Yes   CARDIOMYOPATHY: None known   CONGESTIVE HEART FAILURE: NO KNOWN HISTORY.   VHD: No significant VHD noted  DYSLIPIDEMIA: Patient's profile is at / near [employer] [facility] is low                                Tolerating current medical regimen wellyes,                                                               See most recent Lab values in Labs section above. OTHER RELEVANT DIAGNOSIS:as noted in patient's active problem list:Exacerbation of COPD, patient to see primary care  TESTS ORDERED: Patient to go to the hospital.  [facility] [place] ordered tests reviewed.  MEDICATIONS: Increase  Lisinopril to 20 mg daily.   Office f/u after release from the hospital.   Primary/secondary prevention is the goal by aggressive risk modification, healthy and therapeutic life style changes for cardiovascular risk reduction. Various goals are discussed and multiple questions answered.

## 2020-12-14 NOTE — LETTER
[facility]  [address] [address]  Phone: [phone]  Fax: [phone]    Mira Phelps MD        December 14, 2020     Soco Rivera, APRN - [facility]   [address] [facility]    Patient: Al Sanchez  MR Number: [id number]  YOB: 1950  Date of Visit: 12/14/2020    Dear Dr. Soco Rivera:    Thank you for the request for consultation for lA Sanchez to me for the evaluation of CAD. Below are the relevant portions of my assessment and plan of care. If you have questions, please do not hesitate to call me. I look forward to following Eligha Cowden along with you.     Sincerely,        Mira Phelps MD

## 2020-12-14 NOTE — PROGRESS NOTES
mouth nightly      Melatonin 10 MG CAPS Take 10 mg by mouth as needed      ipratropium-albuterol (DUONEB) 0.5-2.5 (3) MG/3ML SOLN nebulizer solution Take 3 mLs by nebulization 4 times daily 360 mL 0    budesonide-formoterol (SYMBICORT) 160-4.5 MCG/ACT AERO Inhale 2 puffs into the lungs 2 times daily      docusate sodium (COLACE) 100 MG capsule Take 100 mg by mouth as needed       nitroGLYCERIN (NITROSTAT) 0.4 MG SL tablet Place 1 tablet under the tongue every 5 minutes as needed for Chest pain 25 tablet 3    albuterol-ipratropium (COMBIVENT RESPIMAT)  MCG/ACT AERS inhaler Inhale 1 puff into the lungs 4 times daily 1 Inhaler 5    aspirin 81 MG chewable tablet Take 1 tablet by mouth daily 30 tablet 3    FLUoxetine (PROZAC) 20 MG capsule Take 40 mg by mouth daily      SUMAtriptan (IMITREX) 100 MG tablet Take 100 mg by mouth once as needed for Migraine May repeat in 2 hours in needed       No current facility-administered medications for this visit. Allergies: Seasonal  Past Medical History:   Diagnosis Date    Arthritis     Asthma     CAD (coronary artery disease)     COPD (chronic obstructive pulmonary disease) ([facility] [address]     H/O Doppler ultrasound (Bilateral Carotid) 04/26/2018    No hemodynamically significant stenosis noted in the internal carotid artery bilaterally.  H/O echocardiogram 03/07/2017; 12/8/2020    Left ventricular systolic function is moderately depressed with EF 35-40%. Mild-Moderate Pulmonary HTN.  History of nuclear stress test 03/07/2017; 11/3/2020    LVEF is low, EF 40%. Normal perfusion study.     Hypertension     Mitral regurgitation     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    Lima to LAD & Diag, SVG to Post lat RCA & Ramus     Past Surgical History:   Procedure Laterality Date    APPENDECTOMY      CARDIAC SURGERY      HYSTERECTOMY      INTRACAPSULAR CATARACT EXTRACTION Left 9/26/2019    EYE CATARACT EMULSIFICATION IOL IMPLANT performed by Vijay Murillo MD at [facility] KNEE SURGERY      SINUS SURGERY      TONSILLECTOMY        As reviewed   Family History   Problem Relation Age of Onset    Other Mother         copd, emphysema    Colon Cancer Sister     Other Brother         copd, agent orange    Other Brother         agent orange     Social History     Tobacco Use    Smoking status: Former Smoker     Packs/day: 0.00     Years: 40.00     Pack years: 0.00     Types: Cigarettes     Quit date: 5/15/2015     Years since quittin.5    Smokeless tobacco: Never Used   Substance Use Topics    Alcohol use: No     Alcohol/week: 0.0 standard drinks      Review of Systems:    Constitutional: Negative for diaphoresis and fatigue  Psychological:Negative for anxiety or depression  HENT: Negative for headaches, nasal congestion, sinus pain or vertigo  Eyes: Negative for visual disturbance. Endocrine: Negative for polydipsia/polyuria  Respiratory: Negative for shortness of breath  Cardiovascular: Negative for chest pain, dyspnea on exertion, claudication, edema, irregular heartbeat, murmur, palpitations or shortness of breath  Gastrointestinal: Negative for abdominal pain or heartburn  Genito-Urinary: Negative for urinary frequency/urgency  Musculoskeletal: Negative for muscle pain, muscular weakness, negative for pain in arm and leg or swelling in foot and leg  Neurological: Negative for dizziness, headaches, memory loss, numbness/tingling, visual changes, syncope  Dermatological: Negative for rash    Objective:  LMP  (LMP Unknown)   Wt Readings from Last 3 Encounters:   19 116 lb (52.6 kg)   19 116 lb (52.6 kg)   10/18/18 123 lb (55.8 kg)     There is no height or weight on file to calculate BMI. Patient-Reported Vitals 2020   Patient-Reported Weight 103.6   Patient-Reported Height 5 6   Patient-Reported Systolic 550   Patient-Reported Diastolic 93   Patient-Reported Pulse 73       GENERAL - Alert, oriented, pleasant, in no apparent distress.   EYES: No jaundice, no conjunctival pallor. SKIN:  No rashes. No Echhymosis    HEENT - No clinically significant abnormalities seen. Neck - Supple. Cardiovascular -   Extremities - No cyanosis, clubbing, or significant edema. Pulmonary - No respiratory distress. Abdomen -  Musculoskeletal - No significant edema. No joint deformities. No muscle wasting. Neurologic -  There were no gross focal neurologic abnormalities.     Lab Review   Lab Results   Component Value Date    TROPONINT <0.010 11/09/2019    TROPONINT <0.010 09/17/2018     BNP:    Lab Results   Component Value Date    PROBNP 1,759 11/09/2019    PROBNP 795.7 11/21/2017     PT/INR:    Lab Results   Component Value Date    INR 1.06 06/08/2017    INR 1.14 06/01/2017     Lab Results   Component Value Date    LABA1C 6.0 05/16/2015     Lab Results   Component Value Date    WBC 11.8 (H) 11/09/2019    WBC 15.8 (H) 09/17/2018    HCT 38.8 11/09/2019    HCT 39.8 09/17/2018    MCV 93.9 11/09/2019    MCV 95.7 09/17/2018     11/09/2019     09/17/2018     Lab Results   Component Value Date    CHOL 121 06/08/2017    TRIG 65 06/08/2017    HDL 32 (L) 04/13/2018    HDL 26 (L) 06/08/2017    LDLDIRECT 98 04/13/2018    LDLDIRECT 86 06/08/2017     Lab Results   Component Value Date    ALT 12 11/09/2019    ALT 21 09/17/2018    AST 13 (L) 11/09/2019    AST 16 09/17/2018     BMP:    Lab Results   Component Value Date     11/09/2019     09/17/2018    K 4.2 11/09/2019    K 3.7 09/17/2018    CL 94 11/09/2019    CL 86 09/17/2018    CO2 23 11/09/2019    CO2 26 09/17/2018    BUN 15 11/09/2019    BUN 9 09/17/2018    CREATININE 0.4 11/09/2019    CREATININE 0.5 09/17/2018     CMP:   Lab Results   Component Value Date     11/09/2019     09/17/2018    K 4.2 11/09/2019    K 3.7 09/17/2018    CL 94 11/09/2019    CL 86 09/17/2018    CO2 23 11/09/2019    CO2 26 09/17/2018    BUN 15 11/09/2019    BUN 9 09/17/2018    CREATININE 0.4 11/09/2019    CREATININE 0.5 09/17/2018 PROT 6.7 11/09/2019    PROT 7.1 09/17/2018    PROT 7.2 10/20/2011     TSH:    Lab Results   Component Value Date    TSHHS 2.510 04/13/2018       QUALITY MEASURES REVIEWED:  1.CAD:Patient is taking anti platelet agent:Yes  2. DYSLIPIDEMIA: Patient is on cholesterol lowering medication:Yes  3. Beta-Blocker therapy for CAD, if prior Myocardial Infarction:Yes  4. Atrial fibrillation & anticoagulation therapy No  5. Discussed weight management strategies. Impression:    No diagnosis found. Patient Active Problem List   Diagnosis Code    STEMI (ST elevation myocardial infarction) ([facility] [address] I21.3    HTN (hypertension) I10    Tobacco abuse Z72.0    Asthma J45.909    Mitral regurgitation I34.0    COPD, mild (HCC) J44.9    S/P CABG x 4 Z95.1    CAD (coronary artery disease) I25.10    Ischemic chest pain (HCC) I20.9    Chest pain R07.9    Chronic Hyponatremia Z71.9    Diastolic dysfunction without heart failure I51.89    Pulmonary HTN ([facility] I27.20       Assessment & Plan:    being evaluated by a Virtual Visit (video visit) encounter to address concerns as mentioned above. A caregiver was present when appropriate. Due to this being a TeleHealth encounter (During [facility]- public health emergency), evaluation of the following organ systems was limited: Vitals/Constitutional/EENT/Resp/CV/GI//MS/Neuro/Skin/Heme-Lymph-Imm. Pursuant to the emergency declaration under the [address] [place] [address] and the [employer] Act, this Virtual Visit was conducted with patient's (and/or legal guardian's) consent, to reduce the patient's risk of exposure to COVID-19 and provide necessary medical care. The patient (and/or legal guardian) has also been advised to contact this office for worsening conditions or problems, and seek emergency medical treatment and/or call 911 if deemed necessary.     Time spent during this visit 15 min.    CAD:Yes   clinically stable. Patient is on optimal medical regimen ( see medication list above )  - Annelisezerwemaria luisa [address] is currently  ?symptomatic from CAD. Worsening SOB           - changes in  treatment:   no           - Testing ordered: no but she might need a cath for further risk stratification. [place] classification: 1  [place] RISK SCORE:  [place] RISK SCORE:  HTN:Not well controlled on current medical regimen, see list above.            - changes in  treatment: Yes   CARDIOMYOPATHY: None known   CONGESTIVE HEART FAILURE: NO KNOWN HISTORY.   VHD: No significant VHD noted  DYSLIPIDEMIA: Patient's profile is at / near [facility] is low                                Tolerating current medical regimen wellyes,                                                               See most recent Lab values in Labs section above. OTHER RELEVANT DIAGNOSIS:as noted in patient's active problem list:Exacerbation of COPD, patient to see primary care  TESTS ORDERED: Patient to go to the hospital.  [facility] previously ordered tests reviewed.  MEDICATIONS: Increase  Lisinopril to 20 mg daily.   Office f/u after release from the hospital.   Primary/secondary prevention is the goal by aggressive risk modification, healthy and therapeutic life style changes for cardiovascular risk reduction.  Various goals are discussed and multiple questions answered.

## 2020-12-15 ENCOUNTER — CARE COORDINATION (OUTPATIENT)
Dept: CARE COORDINATION | Age: 70
End: 2020-12-15

## 2020-12-16 LAB
EKG ATRIAL RATE: 79 BPM
EKG DIAGNOSIS: NORMAL
EKG P AXIS: 63 DEGREES
EKG P-R INTERVAL: 184 MS
EKG Q-T INTERVAL: 426 MS
EKG QRS DURATION: 136 MS
EKG QTC CALCULATION (BAZETT): 488 MS
EKG R AXIS: -73 DEGREES
EKG T AXIS: 103 DEGREES
EKG VENTRICULAR RATE: 79 BPM
SARS-COV-2: NOT DETECTED
SOURCE: NORMAL

## 2020-12-17 ENCOUNTER — CARE COORDINATION (OUTPATIENT)
Dept: CARE COORDINATION | Age: 70
End: 2020-12-17

## 2020-12-17 NOTE — CARE COORDINATION
This is AC's final attemp to call the pt in hopes to f/u with ER visit and COVID 19 monitoring. AC did leave the pt a VM. No further attempts will be made. Jerrod Lange RN  Ambulatory Care Manager  [phone]  [email or web address]

## 2021-01-05 ENCOUNTER — OFFICE VISIT (OUTPATIENT)
Dept: FAMILY MEDICINE CLINIC | Age: 71
End: 2021-01-05
Payer: MEDICARE

## 2021-01-05 VITALS
OXYGEN SATURATION: 96 % | WEIGHT: 103.2 LBS | SYSTOLIC BLOOD PRESSURE: 126 MMHG | TEMPERATURE: 97 F | RESPIRATION RATE: 24 BRPM | DIASTOLIC BLOOD PRESSURE: 80 MMHG | BODY MASS INDEX: 16.66 KG/M2 | HEART RATE: 89 BPM

## 2021-01-05 DIAGNOSIS — J45.20 MILD INTERMITTENT ASTHMA WITHOUT COMPLICATION: ICD-10-CM

## 2021-01-05 DIAGNOSIS — E87.1 HYPONATREMIA: ICD-10-CM

## 2021-01-05 DIAGNOSIS — J44.9 COPD, MILD (HCC): Primary | ICD-10-CM

## 2021-01-05 DIAGNOSIS — I25.10 CORONARY ARTERY DISEASE INVOLVING NATIVE CORONARY ARTERY OF NATIVE HEART WITHOUT ANGINA PECTORIS: ICD-10-CM

## 2021-01-05 DIAGNOSIS — F51.01 PRIMARY INSOMNIA: ICD-10-CM

## 2021-01-05 DIAGNOSIS — F41.9 ANXIETY: ICD-10-CM

## 2021-01-05 DIAGNOSIS — I10 ESSENTIAL HYPERTENSION: ICD-10-CM

## 2021-01-05 PROCEDURE — G8400 PT W/DXA NO RESULTS DOC: HCPCS | Performed by: PHYSICIAN ASSISTANT

## 2021-01-05 PROCEDURE — 4040F PNEUMOC VAC/ADMIN/RCVD: CPT | Performed by: PHYSICIAN ASSISTANT

## 2021-01-05 PROCEDURE — 99205 OFFICE O/P NEW HI 60 MIN: CPT | Performed by: PHYSICIAN ASSISTANT

## 2021-01-05 PROCEDURE — 1090F PRES/ABSN URINE INCON ASSESS: CPT | Performed by: PHYSICIAN ASSISTANT

## 2021-01-05 PROCEDURE — G8926 SPIRO NO PERF OR DOC: HCPCS | Performed by: PHYSICIAN ASSISTANT

## 2021-01-05 PROCEDURE — 96372 THER/PROPH/DIAG INJ SC/IM: CPT | Performed by: PHYSICIAN ASSISTANT

## 2021-01-05 PROCEDURE — 3017F COLORECTAL CA SCREEN DOC REV: CPT | Performed by: PHYSICIAN ASSISTANT

## 2021-01-05 PROCEDURE — G8427 DOCREV CUR MEDS BY ELIG CLIN: HCPCS | Performed by: PHYSICIAN ASSISTANT

## 2021-01-05 PROCEDURE — 3023F SPIROM DOC REV: CPT | Performed by: PHYSICIAN ASSISTANT

## 2021-01-05 PROCEDURE — 1123F ACP DISCUSS/DSCN MKR DOCD: CPT | Performed by: PHYSICIAN ASSISTANT

## 2021-01-05 PROCEDURE — G8419 CALC BMI OUT NRM PARAM NOF/U: HCPCS | Performed by: PHYSICIAN ASSISTANT

## 2021-01-05 PROCEDURE — G8484 FLU IMMUNIZE NO ADMIN: HCPCS | Performed by: PHYSICIAN ASSISTANT

## 2021-01-05 PROCEDURE — 1036F TOBACCO NON-USER: CPT | Performed by: PHYSICIAN ASSISTANT

## 2021-01-05 RX ORDER — BETAMETHASONE SODIUM PHOSPHATE AND BETAMETHASONE ACETATE 3; 3 MG/ML; MG/ML
12 INJECTION, SUSPENSION INTRA-ARTICULAR; INTRALESIONAL; INTRAMUSCULAR; SOFT TISSUE ONCE
Status: COMPLETED | OUTPATIENT
Start: 2021-01-05 | End: 2021-01-05

## 2021-01-05 RX ORDER — BUSPIRONE HYDROCHLORIDE 5 MG/1
5 TABLET ORAL 2 TIMES DAILY
Qty: 60 TABLET | Refills: 5 | Status: SHIPPED | OUTPATIENT
Start: 2021-01-05 | End: 2021-02-04

## 2021-01-05 RX ORDER — PREDNISONE 20 MG/1
20 TABLET ORAL 2 TIMES DAILY
Qty: 10 TABLET | Refills: 0 | Status: SHIPPED | OUTPATIENT
Start: 2021-01-05 | End: 2021-01-10

## 2021-01-05 RX ORDER — MONTELUKAST SODIUM 10 MG/1
10 TABLET ORAL NIGHTLY
Qty: 30 TABLET | Refills: 5 | Status: SHIPPED | OUTPATIENT
Start: 2021-01-05 | End: 2021-01-25

## 2021-01-05 RX ORDER — NITROGLYCERIN 0.4 MG/1
0.4 TABLET SUBLINGUAL EVERY 5 MIN PRN
Qty: 25 TABLET | Refills: 3 | Status: SHIPPED | OUTPATIENT
Start: 2021-01-05

## 2021-01-05 RX ORDER — DOXEPIN HYDROCHLORIDE 10 MG/1
10 CAPSULE ORAL NIGHTLY
Qty: 30 CAPSULE | Refills: 5 | Status: SHIPPED | OUTPATIENT
Start: 2021-01-05 | End: 2021-07-06 | Stop reason: SDUPTHER

## 2021-01-05 RX ORDER — PREDNISONE 10 MG/1
10 TABLET ORAL DAILY
Qty: 30 TABLET | Refills: 0 | Status: SHIPPED | OUTPATIENT
Start: 2021-01-05 | End: 2021-01-15

## 2021-01-05 RX ADMIN — BETAMETHASONE SODIUM PHOSPHATE AND BETAMETHASONE ACETATE 12 MG: 3; 3 INJECTION, SUSPENSION INTRA-ARTICULAR; INTRALESIONAL; INTRAMUSCULAR; SOFT TISSUE at 15:55

## 2021-01-05 ASSESSMENT — PATIENT HEALTH QUESTIONNAIRE - PHQ9
SUM OF ALL RESPONSES TO PHQ QUESTIONS 1-9: 0
2. FEELING DOWN, DEPRESSED OR HOPELESS: 0
1. LITTLE INTEREST OR PLEASURE IN DOING THINGS: 0
SUM OF ALL RESPONSES TO PHQ9 QUESTIONS 1 & 2: 0

## 2021-01-05 ASSESSMENT — ENCOUNTER SYMPTOMS
DIARRHEA: 0
CHEST TIGHTNESS: 1
ABDOMINAL PAIN: 0
NAUSEA: 0
WHEEZING: 1
EYES NEGATIVE: 1
VOMITING: 0
COUGH: 0
SHORTNESS OF BREATH: 1

## 2021-01-05 NOTE — ASSESSMENT & PLAN NOTE
Reviewed risks/se of ambien, especially with underlying copd.  Pt and  voice understanding, will add doxepin at bedtime-Risks/benefits/SE reviewed, pt voices understanding

## 2021-01-05 NOTE — PROGRESS NOTES
Mustapha Azevedos  1950  79 y.o.  female    SUBJECTIVE:    Chief Complaint   Patient presents with   Diony Roy Doctor     patient is here to establish care     Shortness of Breath     feels SOB has copd has inhaler       HPI   Pt here to establish care today. Has been seeing Dr. Lisette Camejo but  states she has not been able to get appt for several months. COPD-mild to moderate, uses O2 prn at home. Was on prednisone 10 mg daily but has been out of meds for several months. Has been having increasing sob almost daily. Compliant with symbicort/albuterol. Seen for COPD exacerbation in ER 12/14/2020. Given steroid taper and pt states she had minimal improvement. Pt requesting referral to [facility] (pulmonology) today. Pt states her anxiety frequently causes her to feel sob or worsens her COPD. Pt is noted to also have asthma which she describes as mild/intermittent. Pt appears sob during visit today, states she was anxious about coming in, worsened with walking from parking lot. O22l applied and symptoms improved by end of visit. CAD-past hx STEMI/hyperlipidemia, pt on antiplatelet agent/statin. Compliant with both, follows with cardiology. Last visit 12/14/2020. Pt denies CP currently, would like refill on nitro as it was last filled in 2017. HTN-The patient is taking hypertensive medications compliantly without side effects. Denies chest pain, dyspnea, edema, or TIA's. Hyponatremia-chronic, Na 130 in ER 12/14/2020. Will need repeat labs. Insomnia-pt has used zolpidem in past, has not filled since July, 2020 per [facility]. Has difficulty falling asleep, sometimes takes several hours.       Current Outpatient Medications on File Prior to Visit   Medication Sig Dispense Refill    lisinopril (PRINIVIL;ZESTRIL) 20 MG tablet Take 1 tablet by mouth daily 30 tablet 5    carvedilol (COREG) 12.5 MG tablet Take 1 tablet by mouth 2 times daily (with meals) 60 tablet 5    isosorbide mononitrate (IMDUR) 60 MG extended release tablet Take 0.5 tablets by mouth daily 30 tablet 2    atorvastatin (LIPITOR) 40 MG tablet Take 1 tablet by mouth daily 30 tablet 5    predniSONE (DELTASONE) 10 MG tablet 10 mg daily       albuterol sulfate  (90 Base) MCG/ACT inhaler Inhale 2 puffs into the lungs every 6 hours as needed for Wheezing      Melatonin 10 MG CAPS Take 10 mg by mouth as needed      ipratropium-albuterol (DUONEB) 0.5-2.5 (3) MG/3ML SOLN nebulizer solution Take 3 mLs by nebulization 4 times daily 360 mL 0    budesonide-formoterol (SYMBICORT) 160-4.5 MCG/ACT AERO Inhale 2 puffs into the lungs 2 times daily      docusate sodium (COLACE) 100 MG capsule Take 100 mg by mouth as needed       albuterol-ipratropium (COMBIVENT RESPIMAT)  MCG/ACT AERS inhaler Inhale 1 puff into the lungs 4 times daily 1 Inhaler 5    aspirin 81 MG chewable tablet Take 1 tablet by mouth daily 30 tablet 3    FLUoxetine (PROZAC) 20 MG capsule Take 40 mg by mouth daily      oxybutynin (DITROPAN) 5 MG tablet Take 5 mg by mouth 2 times daily      SUMAtriptan (IMITREX) 100 MG tablet Take 100 mg by mouth once as needed for Migraine May repeat in 2 hours in needed       No current facility-administered medications on file prior to visit. Review of PMH, PSH, Family Hx, Allergies and updates made as needed.     PHQ Scores 1/5/2021   PHQ2 Score 0   PHQ9 Score 0     Interpretation of Total Score Depression Severity: 1-4 = Minimal depression, 5-9 = Mild depression, 10-14 = Moderate depression, 15-19 = Moderately severe depression, 20-27 = Severe depression      Allergies   Allergen Reactions    Seasonal Itching       Past Medical History:   Diagnosis Date    Arthritis     Asthma     CAD (coronary artery disease)     COPD (chronic obstructive pulmonary disease) ([facility] [address]     H/O Doppler ultrasound (Bilateral Carotid) 04/26/2018    No hemodynamically significant stenosis noted in the internal carotid artery bilaterally.  H/O echocardiogram 2017; 2020    Left ventricular systolic function is moderately depressed with EF 35-40%. Mild-Moderate Pulmonary HTN.  History of nuclear stress test 2017; 11/3/2020    LVEF is low, EF 40%. Normal perfusion study.     Hyperlipidemia     Hypertension     Mitral regurgitation     S/P CABG x 4 2015    Lima to LAD & Diag, SVG to Post lat RCA & Ramus       Past Surgical History:   Procedure Laterality Date    APPENDECTOMY      CARDIAC SURGERY      HYSTERECTOMY      INTRACAPSULAR CATARACT EXTRACTION Left 2019    EYE CATARACT EMULSIFICATION IOL IMPLANT performed by Malick Tobias MD at [address] [place] [address]      TONSILLECTOMY         Social History     Socioeconomic History    Marital status:      Spouse name: None    Number of children: None    Years of education: None    Highest education level: None   Occupational History    None   Social Needs    Financial resource strain: None    Food insecurity     Worry: None     Inability: None    Transportation needs     Medical: None     Non-medical: None   Tobacco Use    Smoking status: Former Smoker     Packs/day: 0.00     Years: 40.00     Pack years: 0.00     Types: Cigarettes     Quit date: 5/15/2015     Years since quittin.6    Smokeless tobacco: Never Used   Substance and Sexual Activity    Alcohol use: No     Alcohol/week: 0.0 standard drinks    Drug use: No    Sexual activity: Yes     Partners: Male   Lifestyle    Physical activity     Days per week: None     Minutes per session: None    Stress: None   Relationships    Social connections     Talks on phone: None     Gets together: None     Attends [employer] service: None     Active member of club or organization: None     Attends meetings of clubs or organizations: None     Relationship status: None    Intimate partner violence     Fear of current or ex partner: None     Emotionally abused: None Status: She is alert and oriented to person, place, and time. Psychiatric:         Mood and Affect: Mood normal.         ASSESSMENT/PLAN:    Problem List        Circulatory    HTN (hypertension)     Well controlled with current treatment plan, no change indicated at this time.             Relevant Orders    Comprehensive Metabolic Panel    CAD (coronary artery disease)     Continue all current meds and f/u with cardiology as scheduled, will refill nitro         Relevant Medications    aspirin 81 MG chewable tablet    isosorbide mononitrate (IMDUR) 60 MG extended release tablet    atorvastatin (LIPITOR) 40 MG tablet    carvedilol (COREG) 12.5 MG tablet    lisinopril (PRINIVIL;ZESTRIL) 20 MG tablet    nitroGLYCERIN (NITROSTAT) 0.4 MG SL tablet       Respiratory    COPD, mild (HCC) - Primary     Celestone 12 mg Im now, start prednisone 20 mg bid tomorrow, once burst is gone then pt is to resume prednisone 10 mg daily, refer to Lanny Route, ER if worse         Relevant Medications    albuterol-ipratropium (COMBIVENT RESPIMAT)  MCG/ACT AERS inhaler    budesonide-formoterol (SYMBICORT) 160-4.5 MCG/ACT AERO    ipratropium-albuterol (DUONEB) 0.5-2.5 (3) MG/3ML SOLN nebulizer solution    albuterol sulfate  (90 Base) MCG/ACT inhaler    predniSONE (DELTASONE) 10 MG tablet    predniSONE (DELTASONE) 20 MG tablet    predniSONE (DELTASONE) 10 MG tablet    montelukast (SINGULAIR) 10 MG tablet    Other Relevant Orders    Kathia Baumann CNP, Pulmonology, [place]    Asthma     Refer to Lanny Route         Relevant Medications    albuterol-ipratropium (COMBIVENT RESPIMAT)  MCG/ACT AERS inhaler    budesonide-formoterol (SYMBICORT) 160-4.5 MCG/ACT AERO    ipratropium-albuterol (DUONEB) 0.5-2.5 (3) MG/3ML SOLN nebulizer solution    albuterol sulfate  (90 Base) MCG/ACT inhaler    montelukast (SINGULAIR) 10 MG tablet    Other Relevant Orders    Kathia Baumann CNP, Pulmonology, [facility]       Other Primary insomnia     Reviewed risks/se of ambien, especially with underlying copd. Pt and  voice understanding, will add doxepin at bedtime-Risks/benefits/SE reviewed, pt voices understanding           Chronic Hyponatremia     Repeat labs         Relevant Orders    Comprehensive Metabolic Panel    Anxiety     Start buspar bid, Risks/benefits/SE reviewed, pt voices understanding           Relevant Medications    FLUoxetine (PROZAC) 20 MG capsule    busPIRone (BUSPAR) 5 MG tablet    doxepin (SINEQUAN) 10 MG capsule               Return in about 2 months (around 3/5/2021).

## 2021-01-05 NOTE — ASSESSMENT & PLAN NOTE
Celestone 12 mg Im now, start prednisone 20 mg bid tomorrow, once burst is gone then pt is to resume prednisone 10 mg daily, refer to UZAIR Fox if worse

## 2021-01-21 ENCOUNTER — TELEPHONE (OUTPATIENT)
Dept: FAMILY MEDICINE CLINIC | Age: 71
End: 2021-01-21

## 2021-01-21 RX ORDER — PREDNISONE 10 MG/1
10 TABLET ORAL DAILY
Qty: 30 TABLET | Refills: 5 | Status: SHIPPED | OUTPATIENT
Start: 2021-01-21 | End: 2021-06-04 | Stop reason: DRUGHIGH

## 2021-01-21 NOTE — TELEPHONE ENCOUNTER
Patient's  says patient needs a refill on prednisone 10 mg once daily [facility].

## 2021-01-25 ENCOUNTER — INITIAL CONSULT (OUTPATIENT)
Dept: PULMONOLOGY | Age: 71
End: 2021-01-25
Payer: MEDICARE

## 2021-01-25 ENCOUNTER — HOSPITAL ENCOUNTER (OUTPATIENT)
Age: 71
Discharge: HOME | End: 2021-01-25
Payer: MEDICARE

## 2021-01-25 VITALS
DIASTOLIC BLOOD PRESSURE: 90 MMHG | OXYGEN SATURATION: 85 % | RESPIRATION RATE: 24 BRPM | BODY MASS INDEX: 17.13 KG/M2 | HEIGHT: 66 IN | HEART RATE: 79 BPM | WEIGHT: 106.6 LBS | SYSTOLIC BLOOD PRESSURE: 152 MMHG | TEMPERATURE: 97.3 F

## 2021-01-25 DIAGNOSIS — Z87.891 PERSONAL HISTORY OF NICOTINE DEPENDENCE: ICD-10-CM

## 2021-01-25 DIAGNOSIS — R06.03 ACUTE RESPIRATORY DISTRESS: Primary | ICD-10-CM

## 2021-01-25 DIAGNOSIS — Z01.818 PREOP TESTING: ICD-10-CM

## 2021-01-25 DIAGNOSIS — J44.9 CHRONIC OBSTRUCTIVE PULMONARY DISEASE, UNSPECIFIED COPD TYPE (HCC): ICD-10-CM

## 2021-01-25 DIAGNOSIS — Z00.00 HEALTHCARE MAINTENANCE: ICD-10-CM

## 2021-01-25 DIAGNOSIS — J96.01 ACUTE RESPIRATORY FAILURE WITH HYPOXIA (HCC): ICD-10-CM

## 2021-01-25 PROCEDURE — 99204 OFFICE O/P NEW MOD 45 MIN: CPT | Performed by: NURSE PRACTITIONER

## 2021-01-25 PROCEDURE — C9803 HOPD COVID-19 SPEC COLLECT: HCPCS

## 2021-01-25 PROCEDURE — 3023F SPIROM DOC REV: CPT | Performed by: NURSE PRACTITIONER

## 2021-01-25 PROCEDURE — G8427 DOCREV CUR MEDS BY ELIG CLIN: HCPCS | Performed by: NURSE PRACTITIONER

## 2021-01-25 PROCEDURE — G8419 CALC BMI OUT NRM PARAM NOF/U: HCPCS | Performed by: NURSE PRACTITIONER

## 2021-01-25 PROCEDURE — G8926 SPIRO NO PERF OR DOC: HCPCS | Performed by: NURSE PRACTITIONER

## 2021-01-25 PROCEDURE — G8484 FLU IMMUNIZE NO ADMIN: HCPCS | Performed by: NURSE PRACTITIONER

## 2021-01-25 PROCEDURE — U0002 COVID-19 LAB TEST NON-CDC: HCPCS

## 2021-01-25 PROCEDURE — 1090F PRES/ABSN URINE INCON ASSESS: CPT | Performed by: NURSE PRACTITIONER

## 2021-01-25 RX ORDER — BUDESONIDE 0.5 MG/2ML
500 INHALANT ORAL 2 TIMES DAILY
Qty: 30 AMPULE | Refills: 5 | Status: ON HOLD | OUTPATIENT
Start: 2021-01-25 | End: 2022-08-18

## 2021-01-25 RX ORDER — ARFORMOTEROL TARTRATE 15 UG/2ML
1 SOLUTION RESPIRATORY (INHALATION) 2 TIMES DAILY
Qty: 120 ML | Refills: 5 | Status: SHIPPED | OUTPATIENT
Start: 2021-01-25

## 2021-01-25 NOTE — PROGRESS NOTES
Subjective:   CHIEF COMPLAINT / HPI:       Wili Dixon is a 79 y.o. female with history of asthma, COPD, HTN, primary insomnia, HTN, STEMI, CAD status post CABG x4, anxiety, presents to pulmonary clinic for consult and evaluation of shortness of breath. Gianna's son and grandson have accompanied her to her appointment today. Diego Landon appears older than stated age. She appears in respiratory distress with noted use of accessory muscles to support respiratory effort, head bobbing with each breath and unable to speak more than 1 word at a time due to shortness of breath. Her O2 sat is noted at 85% in room air. She was placed on 2 L supplemental oxygen via nasal cannula and after approximately 5 minutes O2 sat was noted increased to 90% at 10 minutes O2 sat was noted to be 94%. She did receive 2 back-to-back albuterol nebulizer treatments. Following the application of oxygen and the nebulizer treatments she is able to speak in full sentences. She states she feels much more comfortable. She denied any chest pain before treatments or after treatment. Her son states that they have an oxygen unit at home that her daughter had purchased for Diego Landon and that she has been using intermittently at home even though she has not been prescribed oxygen. He states that Diego Landon is significantly short of breath just walking 15 feet to the bathroom. Son states that the oxygen is set between 1.5 and 2 L/min at home when she does wear oxygen. Diego Landon states that she will wear the oxygen when she feels short of breath but does not wear it all the time due to the large nests of the apparatus and inability to move it throughout her living area. Reviewing medications Diego Landon states that she is no longer using her Combivent Respimat, she does use her DuoNeb 2-3 times a day. She does take Singulair 10 mg at night.   She is supposed to be using Symbicort 2 puffs morning 2 puffs evening but states that she has not been using this for \"sometime\". Jaelyn Ortega states that she was tested for Covid in December and testing was negative. She denies pulmonary function testing or seeing a pulmonologist in \"sometime\". She does not recall ever having a CT lung screening. José Luis Lovell is a prior smoker she started at age 25 she states she smoked 1 pack of cigarettes a day minimally her adult life. She did quit in 2015, she has a 48-year pack history. Jaelyn Ortega states they are practicing social distancing, wearing a mask when out in public, washing hands frequently or using hand . Denies any fever, chills, malaise, change in sensation of taste or smell, headache or lightheadedness. Denies any known contacts with persons with respiratory infection, positive for coronavirus or under investigation for possible coronavirus exposure. Influenza immunization: 2/2020  Pneumococcal immunization: Prevnar 13 on 2/18/2020, 6/10/2017  Smoker started at age 25, 1 ppd, quit smoking 2010  PCP HUSSAIN Mata    Past Medical History:  Past Medical History:   Diagnosis Date    Arthritis     Asthma     CAD (coronary artery disease)     COPD (chronic obstructive pulmonary disease) ([facility] [address]     H/O Doppler ultrasound (Bilateral Carotid) 04/26/2018    No hemodynamically significant stenosis noted in the internal carotid artery bilaterally.  H/O echocardiogram 03/07/2017; 12/8/2020    Left ventricular systolic function is moderately depressed with EF 35-40%. Mild-Moderate Pulmonary HTN.  History of nuclear stress test 03/07/2017; 11/3/2020    LVEF is low, EF 40%. Normal perfusion study.    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    [place] to LAD & Diag, SVG to Post lat RCA & Ramus       Current Medications:      Current Outpatient Medications:     Arformoterol Tartrate (BROVANA) 15 MCG/2ML NEBU, Take 1 ampule by nebulization 2 times daily, Disp: 120 mL, Rfl: 5    budesonide (PULMICORT) 0.5 MG/2ML nebulizer suspension, Take 2 mLs by nebulization 2 times daily, Disp: 30 ampule, Rfl: 5    predniSONE (DELTASONE) 10 MG tablet, Take 1 tablet by mouth daily, Disp: 30 tablet, Rfl: 5    busPIRone (BUSPAR) 5 MG tablet, Take 1 tablet by mouth 2 times daily, Disp: 60 tablet, Rfl: 5    doxepin (SINEQUAN) 10 MG capsule, Take 1 capsule by mouth nightly, Disp: 30 capsule, Rfl: 5    nitroGLYCERIN (NITROSTAT) 0.4 MG SL tablet, Place 1 tablet under the tongue every 5 minutes as needed for Chest pain, Disp: 25 tablet, Rfl: 3    lisinopril (PRINIVIL;ZESTRIL) 20 MG tablet, Take 1 tablet by mouth daily, Disp: 30 tablet, Rfl: 5    carvedilol (COREG) 12.5 MG tablet, Take 1 tablet by mouth 2 times daily (with meals), Disp: 60 tablet, Rfl: 5    isosorbide mononitrate (IMDUR) 60 MG extended release tablet, Take 0.5 tablets by mouth daily, Disp: 30 tablet, Rfl: 2    atorvastatin (LIPITOR) 40 MG tablet, Take 1 tablet by mouth daily, Disp: 30 tablet, Rfl: 5    albuterol sulfate  (90 Base) MCG/ACT inhaler, Inhale 2 puffs into the lungs every 6 hours as needed for Wheezing, Disp: , Rfl:     Melatonin 10 MG CAPS, Take 10 mg by mouth as needed, Disp: , Rfl:     ipratropium-albuterol (DUONEB) 0.5-2.5 (3) MG/3ML SOLN nebulizer solution, Take 3 mLs by nebulization 4 times daily, Disp: 360 mL, Rfl: 0    docusate sodium (COLACE) 100 MG capsule, Take 100 mg by mouth as needed , Disp: , Rfl:     aspirin 81 MG chewable tablet, Take 1 tablet by mouth daily, Disp: 30 tablet, Rfl: 3    FLUoxetine (PROZAC) 20 MG capsule, Take 40 mg by mouth daily, Disp: , Rfl:     albuterol-ipratropium (COMBIVENT RESPIMAT)  MCG/ACT AERS inhaler, Inhale 1 puff into the lungs 4 times daily (Patient not taking: Reported on 1/25/2021), Disp: 1 Inhaler, Rfl: 5    Allergies   Allergen Reactions    Seasonal Itching       Social History:    Social History     Socioeconomic History    Marital status:      Spouse name: None    Number of children: None  Years of education: None    Highest education level: None   Occupational History    None   Social Needs    Financial resource strain: None    Food insecurity     Worry: None     Inability: None    Transportation needs     Medical: None     Non-medical: None   Tobacco Use    Smoking status: Former Smoker     Packs/day: 1.00     Years: 40.00     Pack years: 40.00     Types: Cigarettes     Quit date: 5/15/2015     Years since quittin.7    Smokeless tobacco: Never Used   Substance and Sexual Activity    Alcohol use: No     Alcohol/week: 0.0 standard drinks    Drug use: No    Sexual activity: Yes     Partners: Male   Lifestyle    Physical activity     Days per week: None     Minutes per session: None    Stress: None   Relationships    Social connections     Talks on phone: None     Gets together: None     Attends Anabaptist service: None     Active member of club or organization: None     Attends meetings of clubs or organizations: None     Relationship status: None    Intimate partner violence     Fear of current or ex partner: None     Emotionally abused: None     Physically abused: None     Forced sexual activity: None   Other Topics Concern    None   Social History Narrative    ** Merged History Encounter **            Family History:    Family History   Problem Relation Age of Onset    Other Mother         copd, emphysema    Colon Cancer Sister     Other Brother         copd, agent orange    Other Brother         agent orange         REVIEW OF SYSTEMS:    CONSTITUTIONAL:  negative for fevers, chills, diaphoresis, activity change, appetite change, night sweats and unexpected weight change.    HEENT:  negative for hearing loss,  sinus pressure, nasal congestion, epistaxis and snoring  RESPIRATORY: Positive shortness of breath, positive cough, negative wheeze  CARDIOVASCULAR:  Negative for chest pain, palpitations, exertional chest pressure/discomfort, edema, syncope  GASTROINTESTINAL: negative scattered groundglass opacities seen within the lungs bilaterally which could represent a atypical infection process versus possible pulmonary edema. Basilar atelectatic cyst is also identified. Unable to evaluate mediastinum to assess for lymphadenopathy    PFT: To be obtained    Assessment      1. Acute respiratory distress    2. Acute respiratory failure with hypoxia (HCC)    3. Chronic obstructive pulmonary disease, unspecified COPD type ([address]    4. Personal history of nicotine dependence     5. Preop testing    6. Healthcare maintenance        Plan:  Trenia Boxer was noted in significant acute respiratory distress upon presentation to the clinic. She was placed on oxygen after her oxygen level was found to be 85%. We started at 2 L and after 10 minutes her O2 sat at rest was 94%. She did receive 2 back-to-back albuterol treatments for findings of significant diminished air movement. She did show improvement following albuterol treatments. I am recommending that Trenia Boxer be on oxygen 24/7 at 2 L/min via nasal cannula. I will have my [facility] office contact [facility] home medical equipment tomorrow in regards to setting her up for oxygen supplies and testing for small portable unit to assist family and Trenia Boxer with getting out to medical appointments and other necessary outings. Her son is to change the setting on the oxygen machine she has at home to 2 L/min. Based on what appears to be the severity of her lung disease at this time I am recommending that we change her medications to nebulized medications. I will start her on Brovana and Pulmicort to nebulized treatments a day each, 1 in the morning and 1 in the evening. She is to obtain a Covid prescreening test today when she leaves the office, she is scheduled for a PFT next Monday, February 1. I will make no further changes to her bronchodilator therapy at this time but I feel we will need to add new pulmonary depending upon the PFT results.   I am other testing necessary to complete her care, including time spent reviewing notes, educating patient and family total time spent 80 minutes.

## 2021-01-26 RX ORDER — ALBUTEROL SULFATE 2.5 MG/3ML
2.5 SOLUTION RESPIRATORY (INHALATION) ONCE
Status: DISCONTINUED | OUTPATIENT
Start: 2021-01-26 | End: 2021-07-20 | Stop reason: ALTCHOICE

## 2021-01-27 LAB
SARS-COV-2: NOT DETECTED
SOURCE: NORMAL

## 2021-02-01 ENCOUNTER — TELEPHONE (OUTPATIENT)
Dept: PULMONOLOGY | Age: 71
End: 2021-02-01

## 2021-02-02 ENCOUNTER — HOSPITAL ENCOUNTER (OUTPATIENT)
Dept: CARDIAC REHAB | Age: 71
Discharge: HOME | End: 2021-02-02
Payer: MEDICARE

## 2021-02-02 PROCEDURE — 94640 AIRWAY INHALATION TREATMENT: CPT

## 2021-02-02 PROCEDURE — 94060 EVALUATION OF WHEEZING: CPT

## 2021-02-02 PROCEDURE — 94729 DIFFUSING CAPACITY: CPT

## 2021-02-03 NOTE — TELEPHONE ENCOUNTER
Called [employer]. They informed me her CT lung screen is scheduled on 2/9/21 at arrival time of 4pm in [facility].

## 2021-02-08 ENCOUNTER — TELEPHONE (OUTPATIENT)
Dept: PULMONOLOGY | Age: 71
End: 2021-02-08

## 2021-02-09 ENCOUNTER — HOSPITAL ENCOUNTER (OUTPATIENT)
Dept: CT IMAGING | Age: 71
Discharge: HOME | End: 2021-02-09
Payer: MEDICARE

## 2021-02-09 ENCOUNTER — TELEPHONE (OUTPATIENT)
Dept: PULMONOLOGY | Age: 71
End: 2021-02-09

## 2021-02-09 DIAGNOSIS — J44.9 CHRONIC OBSTRUCTIVE PULMONARY DISEASE, UNSPECIFIED COPD TYPE (HCC): ICD-10-CM

## 2021-02-09 DIAGNOSIS — Z87.891 PERSONAL HISTORY OF NICOTINE DEPENDENCE: ICD-10-CM

## 2021-02-09 PROCEDURE — 71271 CT THORAX LUNG CANCER SCR C-: CPT

## 2021-02-09 NOTE — TELEPHONE ENCOUNTER
Spoke with Stalin in regards to possible assistance with Gianna's [employer] which even with her insurance has a $400 co-pay. Spoke with Morelia Webb who states that she will send out hip and application forms to [facility].     Spoke with Gianna's  Clemente Willard,  informed him of my conversation with Morelia Webb at [place] and that they will be receiving forms in the mail, once he completes the forms he is to contact my office so that we may fax them back to [place].

## 2021-02-11 DIAGNOSIS — R91.1 PULMONARY NODULE: Primary | ICD-10-CM

## 2021-03-05 ENCOUNTER — OFFICE VISIT (OUTPATIENT)
Dept: FAMILY MEDICINE CLINIC | Age: 71
End: 2021-03-05
Payer: MEDICARE

## 2021-03-05 VITALS
HEART RATE: 87 BPM | SYSTOLIC BLOOD PRESSURE: 104 MMHG | WEIGHT: 113 LBS | TEMPERATURE: 98.4 F | HEIGHT: 66 IN | OXYGEN SATURATION: 92 % | DIASTOLIC BLOOD PRESSURE: 52 MMHG | BODY MASS INDEX: 18.16 KG/M2

## 2021-03-05 DIAGNOSIS — J44.9 COPD, MILD (HCC): Primary | ICD-10-CM

## 2021-03-05 DIAGNOSIS — E87.1 HYPONATREMIA: ICD-10-CM

## 2021-03-05 DIAGNOSIS — I10 ESSENTIAL HYPERTENSION: ICD-10-CM

## 2021-03-05 DIAGNOSIS — J45.20 MILD INTERMITTENT ASTHMA WITHOUT COMPLICATION: ICD-10-CM

## 2021-03-05 LAB
A/G RATIO: 1.6 (ref 1.1–2.2)
ALBUMIN SERPL-MCNC: 3.9 G/DL (ref 3.4–5)
ALP BLD-CCNC: 57 U/L (ref 40–129)
ALT SERPL-CCNC: 18 U/L (ref 10–40)
ANION GAP SERPL CALCULATED.3IONS-SCNC: 11 MMOL/L (ref 3–16)
AST SERPL-CCNC: 15 U/L (ref 15–37)
BILIRUB SERPL-MCNC: 0.5 MG/DL (ref 0–1)
BUN BLDV-MCNC: 19 MG/DL (ref 7–20)
CALCIUM SERPL-MCNC: 9.4 MG/DL (ref 8.3–10.6)
CHLORIDE BLD-SCNC: 99 MMOL/L (ref 99–110)
CO2: 24 MMOL/L (ref 21–32)
CREAT SERPL-MCNC: <0.5 MG/DL (ref 0.6–1.2)
GFR AFRICAN AMERICAN: >60
GFR NON-AFRICAN AMERICAN: >60
GLOBULIN: 2.4 G/DL
GLUCOSE BLD-MCNC: 103 MG/DL (ref 70–99)
POTASSIUM SERPL-SCNC: 4.8 MMOL/L (ref 3.5–5.1)
SODIUM BLD-SCNC: 134 MMOL/L (ref 136–145)
TOTAL PROTEIN: 6.3 G/DL (ref 6.4–8.2)

## 2021-03-05 PROCEDURE — G8400 PT W/DXA NO RESULTS DOC: HCPCS | Performed by: PHYSICIAN ASSISTANT

## 2021-03-05 PROCEDURE — 99214 OFFICE O/P EST MOD 30 MIN: CPT | Performed by: PHYSICIAN ASSISTANT

## 2021-03-05 PROCEDURE — G8926 SPIRO NO PERF OR DOC: HCPCS | Performed by: PHYSICIAN ASSISTANT

## 2021-03-05 PROCEDURE — 1090F PRES/ABSN URINE INCON ASSESS: CPT | Performed by: PHYSICIAN ASSISTANT

## 2021-03-05 PROCEDURE — 4040F PNEUMOC VAC/ADMIN/RCVD: CPT | Performed by: PHYSICIAN ASSISTANT

## 2021-03-05 PROCEDURE — 3023F SPIROM DOC REV: CPT | Performed by: PHYSICIAN ASSISTANT

## 2021-03-05 PROCEDURE — G8484 FLU IMMUNIZE NO ADMIN: HCPCS | Performed by: PHYSICIAN ASSISTANT

## 2021-03-05 PROCEDURE — G8427 DOCREV CUR MEDS BY ELIG CLIN: HCPCS | Performed by: PHYSICIAN ASSISTANT

## 2021-03-05 PROCEDURE — 3017F COLORECTAL CA SCREEN DOC REV: CPT | Performed by: PHYSICIAN ASSISTANT

## 2021-03-05 PROCEDURE — 1123F ACP DISCUSS/DSCN MKR DOCD: CPT | Performed by: PHYSICIAN ASSISTANT

## 2021-03-05 PROCEDURE — G8419 CALC BMI OUT NRM PARAM NOF/U: HCPCS | Performed by: PHYSICIAN ASSISTANT

## 2021-03-05 PROCEDURE — 1036F TOBACCO NON-USER: CPT | Performed by: PHYSICIAN ASSISTANT

## 2021-03-05 RX ORDER — BUSPIRONE HYDROCHLORIDE 5 MG/1
1 TABLET ORAL 2 TIMES DAILY
COMMUNITY
Start: 2021-02-28 | End: 2021-07-06 | Stop reason: SDUPTHER

## 2021-03-05 RX ORDER — PREDNISONE 20 MG/1
20 TABLET ORAL 2 TIMES DAILY
Qty: 10 TABLET | Refills: 0 | Status: SHIPPED | OUTPATIENT
Start: 2021-03-05 | End: 2021-03-10

## 2021-03-05 RX ORDER — MONTELUKAST SODIUM 10 MG/1
1 TABLET ORAL DAILY
COMMUNITY
Start: 2021-02-28 | End: 2021-07-06 | Stop reason: SDUPTHER

## 2021-03-05 NOTE — PROGRESS NOTES
Pulmonary Clinic Office Follow up     Porter Burleson is a 79 y.o. female with history of copd, hypoxia, CAD, diastolic heart failure, HTN, HLD, MVR, PAH, former smoker, presents today to the pulmonary clinic for follow up. I last saw Zainab Perez in consult in the pulmonary clinic in [place] on 1/25/2021. At that time she was in moderate respiratory distress. Her oxygen level was 85% at rest in room air. She informed me that she had oxygen at home that she had borrowed from a family member who was no longer using it that she has been using however she does not have oxygen when she is away from home such as doctors appointments. During that visit she received 2 back-to-back albuterol nebulized treatments along with supplemental oxygen. He did show significant improvement after treatments and application of oxygen. She did complete a chest CT lung screening on 2/2/2021, which did note moderate to severe emphysema changes, a stable 5 mm noncalcified nodule in the right upper lobe along with a stable 11 mm pleural-based noted in the right lower lobe. She is a former smoker and states that she quit smoking approximately 5 years ago. She also completed a pulmonary function test on 2/2/2021 which demonstrated moderate to severe obstructive disease with severe decreased DLCO likely secondary to pulmonary vasculature or parenchymal lung disease. At last visit she was placed on nebulized medications including Brovana, Pulmicort, Yupelri, duo nebs. She states she is using her albuterol rescue inhaler at least 4 times a day sometimes as many as 6. She is only taking the Taurus, she thinks but is not sure. But is not taking the other medications due to cost of medications.   She was to have the medications filled through direct Rx but instead she filled the medications with a local pressure/discomfort, edema, syncope  GASTROINTESTINAL: Negative for nausea, vomiting, diarrhea, constipation, blood in stool and abdominal pain  GENITOURINARY:  Negative for frequency, dysuria and hematuria  HEMATOLOGIC/LYMPHATIC:  Negative for easy bruising, bleeding and lymphadenopathy  ALLERGIC/IMMUNOLOGIC:  Negative for recurrent infections, angioedema, anaphylaxis and drug reaction  MUSCULOSKELETAL:  Negative for pain, joint swelling, decreased range of motion and muscle weakness  NEURO: Negative for headache, AMS, decrease sensations  SKIN: Negative for rashes or lesions      Physical Exam:  /60   Pulse 86   Temp 97.2 °F (36.2 °C)   Ht 5' 6\" (1.676 m)   Wt 113 lb (51.3 kg)   LMP  (LMP Unknown)   SpO2 92%   BMI 18.24 kg/m²    Body mass index is 18.24 kg/m². Constitutional:  She appears frail and under nourished  Neck:  Supple, no palpable lymphadenopathy, no JVD  Cardiovascular:  S1, S2 Normal, Regular rhythm, no murmurs or gallops, no pericardial  rubs. Pulmonary: Diminished breath sounds bilateral bases, is noted short of breath not wearing her oxygen in the office and does have conversational dyspnea at 2-3 words. Random track of her O2 sat noted at 86% at rest in room air. She was placed on supplemental oxygen via nasal cannula in the office and within 5 minutes her symptoms did improve and was able to speak full sentences without dyspnea.   No forced expiratory wheeze, no rales, no rhonchi, nonproductive cough is present after placing on oxygen  Abdomen: No epigastric pain, no distention, + bowel sounds   Extremities: No edema, no calf tenderness, no clubbing of digits  Neurologic:  Awake and alert, no focal deficits  Skin: warm and dry    Radiology:   2/2/2021 CT lung screening   Biapical pleural thickening is noted.  Moderate to severe   emphysematous changes are present. Kori Ralf is a stable 5 mm noncalcified   nodule in the right upper lobe best seen on axial image number 47.  There is   a her medications. I have instructed her that her nebulized medications are \"her lifeline\" for her breathing along with her oxygen. She states she understands however I am concerned as to how much she and her son truly understand of medication and the treatment she needs. I feel that case management would be appropriate to assist her at this time. CT lung screening demonstrated 2 stable pulmonary nodules one 5 mm in the right upper lobe and 111 mm in the pleural.  Nodules are stable we will repeat CT scan in 1 year given significant smoking history and current lung disease. She did quit smoking 5 years ago and states she is not exposed to secondary smoke at this time. She does have a history of pulmonary hypertension and has an appointment with cardiology for 5/12/2021. I have discussed the significance of proper immunization and I have strongly encouraged that she receive yearly flu immunization, and the current COVID-19 immunization. Her family is to contact [facility] department to get her scheduled for her COVID-19 immunization. We have discussed Coronavirus precaution including: social distancing when needing to be in public, handwashing practice, wiping items touched in public such as gas pumps, door handles, shopping carts, etc. Self monitoring for infection - fever, chills, cough, SOB. Should they develop symptoms they should call office for further instructions. Return in about 3 months (around 6/8/2021). This dictation was performed with a verbal recognition program and it was checked for errors. It is possible that there are still dictated errors within this office note. Any errors should be brought immediately to my attention for correction. All efforts were made to ensure that this office note is accurate.

## 2021-03-05 NOTE — PROGRESS NOTES
Maggie Licona  1950  79 y.o.  female    SUBJECTIVE:    Chief Complaint   Patient presents with    Hypertension    COPD       HPI  Pt hare for recheck today. Pt has hx COPD/asthma with respiratory failure. Pt was hospitalized in [place], 2020 for COPD exacerbation. Referred to pulmonology s/p discharge. NP ordered home oxygen therapy but pt and son I(who is with her today) state she has not heard from supply company. Pt does have O2 concentrator at home that is approx 11-16 years old. She uses this continuously when home. Does not have portable O2 and is sob/unable to complete sentences today without stopping for deep breaths. Pt immediately placed on O2 2L once examined by PCP. Pt does have appt next week with pulmonology. Chronic hyponatremia-will need labs today    HTN-The patient is taking hypertensive medications compliantly without side effects. Denies chest pain, dyspnea, edema, or TIA's.       PHQ Scores 1/5/2021   PHQ2 Score 0   PHQ9 Score 0     Interpretation of Total Score Depression Severity: 1-4 = Minimal depression, 5-9 = Mild depression, 10-14 = Moderate depression, 15-19 = Moderately severe depression, 20-27 = Severe depression     Current Outpatient Medications on File Prior to Visit   Medication Sig Dispense Refill    busPIRone (BUSPAR) 5 MG tablet Take 1 tablet by mouth 2 times daily      montelukast (SINGULAIR) 10 MG tablet Take 1 tablet by mouth daily      Arformoterol Tartrate (BROVANA) 15 MCG/2ML NEBU Take 1 ampule by nebulization 2 times daily 120 mL 5    budesonide (PULMICORT) 0.5 MG/2ML nebulizer suspension Take 2 mLs by nebulization 2 times daily 30 ampule 5    predniSONE (DELTASONE) 10 MG tablet Take 1 tablet by mouth daily 30 tablet 5    doxepin (SINEQUAN) 10 MG capsule Take 1 capsule by mouth nightly 30 capsule 5    nitroGLYCERIN (NITROSTAT) 0.4 MG SL tablet Place 1 tablet under the tongue every 5 minutes as needed for Chest pain 25 tablet 3    carvedilol (COREG) 12.5 MG tablet Take 1 tablet by mouth 2 times daily (with meals) 60 tablet 5    isosorbide mononitrate (IMDUR) 60 MG extended release tablet Take 0.5 tablets by mouth daily 30 tablet 2    atorvastatin (LIPITOR) 40 MG tablet Take 1 tablet by mouth daily 30 tablet 5    albuterol sulfate  (90 Base) MCG/ACT inhaler Inhale 2 puffs into the lungs every 6 hours as needed for Wheezing      Melatonin 10 MG CAPS Take 10 mg by mouth as needed      ipratropium-albuterol (DUONEB) 0.5-2.5 (3) MG/3ML SOLN nebulizer solution Take 3 mLs by nebulization 4 times daily 360 mL 0    docusate sodium (COLACE) 100 MG capsule Take 100 mg by mouth as needed       aspirin 81 MG chewable tablet Take 1 tablet by mouth daily 30 tablet 3    FLUoxetine (PROZAC) 20 MG capsule Take 40 mg by mouth daily       Current Facility-Administered Medications on File Prior to Visit   Medication Dose Route Frequency Provider Last Rate Last Admin    albuterol (PROVENTIL) nebulizer solution 2.5 mg  2.5 mg Nebulization Once Hollie CHEPE Melton - CNP        albuterol (PROVENTIL) nebulizer solution 2.5 mg  2.5 mg Nebulization Once Hollie CHEPE Melton CNP           Allergies   Allergen Reactions    Seasonal Itching       Past Medical History:   Diagnosis Date    Arthritis     Asthma     CAD (coronary artery disease)     COPD (chronic obstructive pulmonary disease) ([facility] [address]     H/O Doppler ultrasound (Bilateral Carotid) 04/26/2018    No hemodynamically significant stenosis noted in the internal carotid artery bilaterally.  H/O echocardiogram 03/07/2017; 12/8/2020    Left ventricular systolic function is moderately depressed with EF 35-40%. Mild-Moderate Pulmonary HTN.  History of nuclear stress test 03/07/2017; 11/3/2020    LVEF is low, EF 40%. Normal perfusion study.    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    [place] to LAD & Diag, SVG to Post lat RCA & Ramus       Past Surgical History:   Procedure Laterality Date    APPENDECTOMY      CARDIAC SURGERY      HYSTERECTOMY      INTRACAPSULAR CATARACT EXTRACTION Left 2019    EYE CATARACT EMULSIFICATION IOL IMPLANT performed by Kristin Ko MD at [address] [place] [address]      TONSILLECTOMY         Social History     Socioeconomic History    Marital status:      Spouse name: None    Number of children: None    Years of education: None    Highest education level: None   Occupational History    None   Social Needs    Financial resource strain: None    Food insecurity     Worry: None     Inability: None    Transportation needs     Medical: None     Non-medical: None   Tobacco Use    Smoking status: Former Smoker     Packs/day: 1.00     Years: 40.00     Pack years: 40.00     Types: Cigarettes     Quit date: 5/15/2015     Years since quittin.8    Smokeless tobacco: Never Used   Substance and Sexual Activity    Alcohol use: No     Alcohol/week: 0.0 standard drinks    Drug use: No    Sexual activity: Yes     Partners: Male   Lifestyle    Physical activity     Days per week: None     Minutes per session: None    Stress: None   Relationships    Social connections     Talks on phone: None     Gets together: None     Attends Mandaeism service: None     Active member of club or organization: None     Attends meetings of clubs or organizations: None     Relationship status: None    Intimate partner violence     Fear of current or ex partner: None     Emotionally abused: None     Physically abused: None     Forced sexual activity: None   Other Topics Concern    None   Social History Narrative    ** Merged History Encounter **            Review of Systems   Constitutional: Negative for chills and fever. HENT: Negative. Eyes: Negative. Respiratory: Positive for cough, chest tightness, shortness of breath and wheezing. Cardiovascular: Negative for chest pain.    Gastrointestinal: Negative for abdominal pain. Skin: Negative for rash. Neurological: Positive for light-headedness. Negative for dizziness and headaches. Hematological: Negative for adenopathy. Psychiatric/Behavioral: Negative for dysphoric mood. The patient is not nervous/anxious. OBJECTIVE:    BP (!) 104/52   Pulse 87   Temp 98.4 °F (36.9 °C)   Ht 5' 6\" (1.676 m)   Wt 113 lb (51.3 kg)   LMP  (LMP Unknown)   SpO2 92%   BMI 18.24 kg/m²     Physical Exam  Vitals signs reviewed. Constitutional:       Appearance: Normal appearance. HENT:      Head: Normocephalic. Right Ear: External ear normal.      Left Ear: External ear normal.   Neck:      Musculoskeletal: Normal range of motion. Cardiovascular:      Rate and Rhythm: Normal rate and regular rhythm. Heart sounds: Normal heart sounds. Pulmonary:      Breath sounds: Wheezing present. Abdominal:      Palpations: Abdomen is soft. Skin:     General: Skin is warm and dry. Neurological:      Mental Status: She is alert and oriented to person, place, and time. Psychiatric:         Mood and Affect: Mood normal.         Thought Content: Thought content normal.         Sunday Breed:    Problem List        Circulatory    HTN (hypertension)     Cut lisinopril half, monitor bp at home and call in next 1-2 weeks with readings            Respiratory    COPD, mild ([address] - Primary     Contacted [employer], notes from last appt with pulmonology faxed but info is too old and unable to use to qualify pt for home O2. On ambulation here today, O2 down to 92%. Pt has appt next week with pulmonology and instructed to discuss oxygen therapy with her. Continue to use concentrator for now.  Will do short burst of prednisone 20 mg x 5 days for wheezing noted today (hold 10 mg tabs until finished)         Relevant Medications    ipratropium-albuterol (DUONEB) 0.5-2.5 (3) MG/3ML SOLN nebulizer solution    albuterol sulfate  (90 Base) MCG/ACT inhaler    predniSONE (DELTASONE) 10 MG tablet    Arformoterol Tartrate (BROVANA) 15 MCG/2ML NEBU    budesonide (PULMICORT) 0.5 MG/2ML nebulizer suspension    albuterol (PROVENTIL) nebulizer solution 2.5 mg    albuterol (PROVENTIL) nebulizer solution 2.5 mg    montelukast (SINGULAIR) 10 MG tablet    predniSONE (DELTASONE) 20 MG tablet    Asthma     See above         Relevant Medications    ipratropium-albuterol (DUONEB) 0.5-2.5 (3) MG/3ML SOLN nebulizer solution    albuterol sulfate  (90 Base) MCG/ACT inhaler    Arformoterol Tartrate (BROVANA) 15 MCG/2ML NEBU    budesonide (PULMICORT) 0.5 MG/2ML nebulizer suspension    albuterol (PROVENTIL) nebulizer solution 2.5 mg    albuterol (PROVENTIL) nebulizer solution 2.5 mg    montelukast (SINGULAIR) 10 MG tablet       Other    Chronic Hyponatremia     Labs today                    No follow-ups on file.

## 2021-03-08 ENCOUNTER — OFFICE VISIT (OUTPATIENT)
Dept: PULMONOLOGY | Age: 71
End: 2021-03-08
Payer: MEDICARE

## 2021-03-08 VITALS
HEART RATE: 86 BPM | OXYGEN SATURATION: 92 % | SYSTOLIC BLOOD PRESSURE: 106 MMHG | TEMPERATURE: 97.2 F | HEIGHT: 66 IN | BODY MASS INDEX: 18.16 KG/M2 | DIASTOLIC BLOOD PRESSURE: 60 MMHG | WEIGHT: 113 LBS

## 2021-03-08 DIAGNOSIS — R91.1 PULMONARY NODULE: ICD-10-CM

## 2021-03-08 DIAGNOSIS — J44.9 CHRONIC OBSTRUCTIVE PULMONARY DISEASE, UNSPECIFIED COPD TYPE (HCC): Primary | ICD-10-CM

## 2021-03-08 DIAGNOSIS — Z00.00 HEALTHCARE MAINTENANCE: ICD-10-CM

## 2021-03-08 DIAGNOSIS — I27.20 PULMONARY HTN (HCC): Chronic | ICD-10-CM

## 2021-03-08 DIAGNOSIS — J96.11 CHRONIC RESPIRATORY FAILURE WITH HYPOXIA (HCC): ICD-10-CM

## 2021-03-08 DIAGNOSIS — Z87.891 PERSONAL HISTORY OF NICOTINE DEPENDENCE: ICD-10-CM

## 2021-03-08 PROBLEM — I25.2 HISTORY OF ST ELEVATION MYOCARDIAL INFARCTION (STEMI): Status: ACTIVE | Noted: 2021-03-08

## 2021-03-08 PROCEDURE — 1036F TOBACCO NON-USER: CPT | Performed by: NURSE PRACTITIONER

## 2021-03-08 PROCEDURE — G8400 PT W/DXA NO RESULTS DOC: HCPCS | Performed by: NURSE PRACTITIONER

## 2021-03-08 PROCEDURE — G8484 FLU IMMUNIZE NO ADMIN: HCPCS | Performed by: NURSE PRACTITIONER

## 2021-03-08 PROCEDURE — 4040F PNEUMOC VAC/ADMIN/RCVD: CPT | Performed by: NURSE PRACTITIONER

## 2021-03-08 PROCEDURE — 3017F COLORECTAL CA SCREEN DOC REV: CPT | Performed by: NURSE PRACTITIONER

## 2021-03-08 PROCEDURE — 1123F ACP DISCUSS/DSCN MKR DOCD: CPT | Performed by: NURSE PRACTITIONER

## 2021-03-08 PROCEDURE — G8419 CALC BMI OUT NRM PARAM NOF/U: HCPCS | Performed by: NURSE PRACTITIONER

## 2021-03-08 PROCEDURE — 99214 OFFICE O/P EST MOD 30 MIN: CPT | Performed by: NURSE PRACTITIONER

## 2021-03-08 PROCEDURE — 3023F SPIROM DOC REV: CPT | Performed by: NURSE PRACTITIONER

## 2021-03-08 PROCEDURE — G8427 DOCREV CUR MEDS BY ELIG CLIN: HCPCS | Performed by: NURSE PRACTITIONER

## 2021-03-08 PROCEDURE — 1090F PRES/ABSN URINE INCON ASSESS: CPT | Performed by: NURSE PRACTITIONER

## 2021-03-08 PROCEDURE — G8926 SPIRO NO PERF OR DOC: HCPCS | Performed by: NURSE PRACTITIONER

## 2021-03-08 RX ORDER — LISINOPRIL 20 MG/1
10 TABLET ORAL DAILY
Qty: 30 TABLET | Refills: 5 | Status: SHIPPED
Start: 2021-03-08 | End: 2021-06-07 | Stop reason: SDUPTHER

## 2021-03-08 ASSESSMENT — ENCOUNTER SYMPTOMS
COUGH: 1
WHEEZING: 1
ABDOMINAL PAIN: 0
SHORTNESS OF BREATH: 1
CHEST TIGHTNESS: 1
EYES NEGATIVE: 1

## 2021-03-08 NOTE — ASSESSMENT & PLAN NOTE
2020 [facility], notes from last appt with pulmonology faxed but info is too old and unable to use to qualify pt for home O2. On ambulation here today, O2 down to 92%. Pt has appt next week with pulmonology and instructed to discuss oxygen therapy with her. Continue to use concentrator for now.  Will do short burst of prednisone 20 mg x 5 days for wheezing noted today (hold 10 mg tabs until finished)

## 2021-03-10 PROBLEM — R91.1 PULMONARY NODULE: Status: ACTIVE | Noted: 2021-03-10

## 2021-03-10 PROBLEM — J96.11 CHRONIC RESPIRATORY FAILURE WITH HYPOXIA (HCC): Status: ACTIVE | Noted: 2021-03-10

## 2021-03-10 PROBLEM — Z87.891 PERSONAL HISTORY OF NICOTINE DEPENDENCE: Status: ACTIVE | Noted: 2021-03-10

## 2021-03-22 RX ORDER — ISOSORBIDE MONONITRATE 60 MG/1
30 TABLET, EXTENDED RELEASE ORAL DAILY
Qty: 30 TABLET | Refills: 2 | Status: SHIPPED | OUTPATIENT
Start: 2021-03-22 | End: 2021-08-30 | Stop reason: SDUPTHER

## 2021-03-22 RX ORDER — FLUOXETINE HYDROCHLORIDE 20 MG/1
40 CAPSULE ORAL DAILY
Qty: 30 CAPSULE | Refills: 2 | Status: SHIPPED | OUTPATIENT
Start: 2021-03-22 | End: 2021-06-04

## 2021-03-22 NOTE — TELEPHONE ENCOUNTER
Patients  called and left a message on the MA line requesting a refill on isosorbide and fluoxetine.  He stated another provider prescribed the medication was unsure if you would refill the medication her she is know longer seeing the other provider

## 2021-05-12 ENCOUNTER — OFFICE VISIT (OUTPATIENT)
Dept: CARDIOLOGY CLINIC | Age: 71
End: 2021-05-12
Payer: MEDICARE

## 2021-05-12 VITALS
SYSTOLIC BLOOD PRESSURE: 140 MMHG | DIASTOLIC BLOOD PRESSURE: 88 MMHG | WEIGHT: 118 LBS | HEART RATE: 80 BPM | HEIGHT: 66 IN | BODY MASS INDEX: 18.96 KG/M2

## 2021-05-12 DIAGNOSIS — R07.9 CHEST PAIN, UNSPECIFIED TYPE: Primary | ICD-10-CM

## 2021-05-12 DIAGNOSIS — R06.02 SOB (SHORTNESS OF BREATH): ICD-10-CM

## 2021-05-12 PROCEDURE — 4040F PNEUMOC VAC/ADMIN/RCVD: CPT | Performed by: INTERNAL MEDICINE

## 2021-05-12 PROCEDURE — 1123F ACP DISCUSS/DSCN MKR DOCD: CPT | Performed by: INTERNAL MEDICINE

## 2021-05-12 PROCEDURE — G8420 CALC BMI NORM PARAMETERS: HCPCS | Performed by: INTERNAL MEDICINE

## 2021-05-12 PROCEDURE — 99214 OFFICE O/P EST MOD 30 MIN: CPT | Performed by: INTERNAL MEDICINE

## 2021-05-12 PROCEDURE — 93000 ELECTROCARDIOGRAM COMPLETE: CPT | Performed by: INTERNAL MEDICINE

## 2021-05-12 PROCEDURE — 3017F COLORECTAL CA SCREEN DOC REV: CPT | Performed by: INTERNAL MEDICINE

## 2021-05-12 PROCEDURE — G8400 PT W/DXA NO RESULTS DOC: HCPCS | Performed by: INTERNAL MEDICINE

## 2021-05-12 PROCEDURE — 1090F PRES/ABSN URINE INCON ASSESS: CPT | Performed by: INTERNAL MEDICINE

## 2021-05-12 PROCEDURE — 1036F TOBACCO NON-USER: CPT | Performed by: INTERNAL MEDICINE

## 2021-05-12 PROCEDURE — G8427 DOCREV CUR MEDS BY ELIG CLIN: HCPCS | Performed by: INTERNAL MEDICINE

## 2021-05-12 RX ORDER — ATORVASTATIN CALCIUM 40 MG/1
40 TABLET, FILM COATED ORAL DAILY
Qty: 90 TABLET | Refills: 3 | Status: ON HOLD | OUTPATIENT
Start: 2021-05-12 | End: 2022-08-19 | Stop reason: HOSPADM

## 2021-05-12 NOTE — PROGRESS NOTES
OFFICE PROGRESS NOTES      Brett Funez is a 70 y.o. female who has    CHIEF COMPLAINT AS FOLLOWS:   CHEST PAIN:  C/O chest pain this time. episodic   SOB :  Still C/O SOB as before. Patient has bad COPD. She is O2 dependant.  LEG EDEMA: No leg edema   PALPITATIONS:  C/O Palpitations with exertion. As before & lasts for couple of seconds.   DIZZINESS: No C/O   SYNCOPE: None   OTHER:Patient says it is an ordeal coming to [place] [address] from [place]. HPI: Patient is here for F/U on her CAD, HTN & Dyslipidemia problems. CAD: Patient has known Hx of  CAD. Had CABG in the past.  HTN: Patient has known Hx of essential HTN. Has been treated with guideline recommended medical / physical/ diet therapy as stated below. Dyslipidemia: Patient has known Hx of mixed dyslipidemia. Has been treated with guideline recommended medical / physical/ diet therapy as stated below.                      Current Outpatient Medications   Medication Sig Dispense Refill    isosorbide mononitrate (IMDUR) 60 MG extended release tablet Take 0.5 tablets by mouth daily 30 tablet 2    FLUoxetine (PROZAC) 20 MG capsule Take 2 capsules by mouth daily 30 capsule 2    lisinopril (PRINIVIL;ZESTRIL) 20 MG tablet Take 0.5 tablets by mouth daily 30 tablet 5    busPIRone (BUSPAR) 5 MG tablet Take 1 tablet by mouth 2 times daily      montelukast (SINGULAIR) 10 MG tablet Take 1 tablet by mouth daily      Arformoterol Tartrate (BROVANA) 15 MCG/2ML NEBU Take 1 ampule by nebulization 2 times daily 120 mL 5    budesonide (PULMICORT) 0.5 MG/2ML nebulizer suspension Take 2 mLs by nebulization 2 times daily 30 ampule 5    predniSONE (DELTASONE) 10 MG tablet Take 1 tablet by mouth daily 30 tablet 5    doxepin (SINEQUAN) 10 MG capsule Take 1 capsule by mouth nightly 30 capsule 5    nitroGLYCERIN (NITROSTAT) 0.4 MG SL tablet Place 1 tablet under the tongue every 5 minutes as needed for Chest pain 25 tablet 3    carvedilol (COREG) 12.5 MG tablet Take 1 tablet by mouth 2 times daily (with meals) 60 tablet 5    atorvastatin (LIPITOR) 40 MG tablet Take 1 tablet by mouth daily 30 tablet 5    albuterol sulfate  (90 Base) MCG/ACT inhaler Inhale 2 puffs into the lungs every 6 hours as needed for Wheezing      Melatonin 10 MG CAPS Take 10 mg by mouth as needed      ipratropium-albuterol (DUONEB) 0.5-2.5 (3) MG/3ML SOLN nebulizer solution Take 3 mLs by nebulization 4 times daily 360 mL 0    docusate sodium (COLACE) 100 MG capsule Take 100 mg by mouth as needed       aspirin 81 MG chewable tablet Take 1 tablet by mouth daily 30 tablet 3     Current Facility-Administered Medications   Medication Dose Route Frequency Provider Last Rate Last Admin    albuterol (PROVENTIL) nebulizer solution 2.5 mg  2.5 mg Nebulization Once CHEPE Brown CNP        albuterol (PROVENTIL) nebulizer solution 2.5 mg  2.5 mg Nebulization Once CHEPE Brown CNP         Allergies: Seasonal  Review of Systems:    Constitutional: Negative for diaphoresis and fatigue  Respiratory: Negative for shortness of breath  Cardiovascular: Negative for chest pain, dyspnea on exertion, claudication, edema, irregular heartbeat, murmur, palpitations or shortness of breath  Musculoskeletal: Negative for muscle pain, muscular weakness, negative for pain in arm and leg or swelling in foot and leg    Objective:  LMP  (LMP Unknown)   Wt Readings from Last 3 Encounters:   03/08/21 113 lb (51.3 kg)   03/05/21 113 lb (51.3 kg)   01/25/21 106 lb 9.6 oz (48.4 kg)     There is no height or weight on file to calculate BMI. GENERAL - Alert, oriented, pleasant, in no apparent distress. EYES: No jaundice, no conjunctival pallor. Neck - Supple. No jugular venous distention noted. No carotid bruits. Cardiovascular - Normal S1 and S2 without obvious murmur or gallop. Extremities - No cyanosis, clubbing, or significant edema. Pulmonary - No respiratory distress. No wheezes or rales. Lab Review   Lab Results   Component Value Date    TROPONINT <0.010 12/14/2020    TROPONINT <0.010 11/09/2019     Lab Results   Component Value Date    PROBNP 2,481 12/14/2020    PROBNP 1,759 11/09/2019     Lab Results   Component Value Date    INR 1.06 06/08/2017    INR 1.14 06/01/2017     Lab Results   Component Value Date    LABA1C 6.0 05/16/2015     Lab Results   Component Value Date    WBC 13.1 (H) 12/14/2020    WBC 11.8 (H) 11/09/2019    HCT 45.3 12/14/2020    HCT 38.8 11/09/2019    MCV 96.8 12/14/2020    MCV 93.9 11/09/2019     12/14/2020     11/09/2019     Lab Results   Component Value Date    CHOL 121 06/08/2017    TRIG 65 06/08/2017    HDL 32 (L) 04/13/2018    HDL 26 (L) 06/08/2017    LDLDIRECT 98 04/13/2018    LDLDIRECT 86 06/08/2017     Lab Results   Component Value Date    ALT 18 03/05/2021    ALT 14 12/14/2020    AST 15 03/05/2021    AST 19 12/14/2020     BMP:    Lab Results   Component Value Date     03/05/2021     12/14/2020    K 4.8 03/05/2021    K 5.1 12/14/2020    CL 99 03/05/2021    CL 95 12/14/2020    CO2 24 03/05/2021    CO2 33 12/14/2020    BUN 19 03/05/2021    BUN 16 12/14/2020    CREATININE <0.5 03/05/2021    CREATININE 0.4 12/14/2020     CMP:   Lab Results   Component Value Date     03/05/2021     12/14/2020    K 4.8 03/05/2021    K 5.1 12/14/2020    CL 99 03/05/2021    CL 95 12/14/2020    CO2 24 03/05/2021    CO2 33 12/14/2020    BUN 19 03/05/2021    BUN 16 12/14/2020    CREATININE <0.5 03/05/2021    CREATININE 0.4 12/14/2020    PROT 6.3 03/05/2021    PROT 6.5 12/14/2020    PROT 7.2 10/20/2011     Lab Results   Component Value Date    TSHHS 2.510 04/13/2018     EKG: Bigeminal rhythm    ECHO 12/2020   Left ventricular systolic function is moderately depressed with an ejection   fraction of 35 to 40 %. Anterior wall hypokinesis & Septal wall hypokinesis noted. Mild septal wall asymmetrical left ventricular hypertrophy.    The left atrium is mildly dilated. Mild thickening of anterior/posterior leaflets of mitral valve. Right ventricular systolic pressure of 46 mmHg consistent with mild to   moderate pulmonary hypertension. No evidence of pericardial effusion. CARDIOLITE 11/2020    Normal perfusion study with normal distribution in all coronal, short, and    horizontal axis.  LVEF is low probably due to PVCs     QUALITY MEASURES REVIEWED:  1.CAD:Patient is taking anti platelet agent:Yes  2. DYSLIPIDEMIA: Patient is on cholesterol lowering medication:Yes   3. Beta-Blocker therapy for CAD, if prior Myocardial Infarction:Yes   4. Counselled regarding smoking cessation. No   Patient does not Smoke. 5.Anticoagulation therapy (for A.Fib) No   Does Not have A.Fib.  6.Discussed weight management strategies. Assessment & Plan:  Primary / Secondary prevention is the goal by aggressive risk modification, healthy and therapeutic life style changes for cardiovascular risk reduction. Various goals are discussed and multiple questions answered. CAD:Yes s/p CABG X 4, 2015   clinically stable. Patient is on optimal medical regimen ( see medication list above )  -   Patient is currently  ?symptomatic from CAD. Worsening SOB is most likely due to Lungs.           - changes in  treatment:   no           - Testing ordered: no.  Saunders classification: 1  [place] RISK SCORE:  YANA RISK SCORE:  HTN:Not well controlled on current medical regimen, see list above.            - changes in  treatment: Yes   CARDIOMYOPATHY: None known   CONGESTIVE HEART FAILURE: NO KNOWN HISTORY.   VHD: No significant VHD noted  DYSLIPIDEMIA: Patient's profile is at / near [employer] [facility] is low                                Tolerating current medical regimen wellyes,                                                               See most recent Lab values in Labs section above.   OTHER RELEVANT DIAGNOSIS:as noted in patient's active problem list:  Exacerbation of COPD, patient to see a Lung doctor  TESTS ORDERED: none this visit                                   All previously ordered tests reviewed. MEDICATIONS: CPM   Office f/u in 3 months with Rivas Rees in [place].

## 2021-05-12 NOTE — LETTER
[address]  [address] [address]  Phone: [phone]  Fax: [phone]    Antonieta Apple MD        May 12, 2021     [place] [id number]    Patient: Enriqueta Marquez  MR Number: [id number]  YOB: 1950  Date of Visit: 5/12/2021    Dear Dr. Antolin Escalante: Thank you for the request for consultation for [facility] to me for the evaluation of CAD. Below are the relevant portions of my assessment and plan of care. If you have questions, please do not hesitate to call me. I look forward to following Joan Reed along with you.     Sincerely,        Antonieta Apple MD

## 2021-05-20 ENCOUNTER — TELEPHONE (OUTPATIENT)
Dept: FAMILY MEDICINE CLINIC | Age: 71
End: 2021-05-20

## 2021-05-20 RX ORDER — NITROFURANTOIN 25; 75 MG/1; MG/1
100 CAPSULE ORAL 2 TIMES DAILY
Qty: 14 CAPSULE | Refills: 0 | Status: SHIPPED | OUTPATIENT
Start: 2021-05-20 | End: 2021-05-27

## 2021-05-20 NOTE — TELEPHONE ENCOUNTER
I called and spoke with pt and let her know pcp sent in some meds to pharmacy for her and that if she is not improving then she needs to come into the office for a Ua/cx. I relayed the same information to pt's  Lazaro Harrington as well. Pt states understanding.

## 2021-06-04 ENCOUNTER — OFFICE VISIT (OUTPATIENT)
Dept: FAMILY MEDICINE CLINIC | Age: 71
End: 2021-06-04
Payer: MEDICARE

## 2021-06-04 VITALS
BODY MASS INDEX: 19.35 KG/M2 | HEART RATE: 75 BPM | OXYGEN SATURATION: 94 % | TEMPERATURE: 98.4 F | RESPIRATION RATE: 24 BRPM | HEIGHT: 66 IN | WEIGHT: 120.4 LBS | DIASTOLIC BLOOD PRESSURE: 82 MMHG | SYSTOLIC BLOOD PRESSURE: 140 MMHG

## 2021-06-04 DIAGNOSIS — Z12.11 COLON CANCER SCREENING: ICD-10-CM

## 2021-06-04 DIAGNOSIS — Z23 NEED FOR SHINGLES VACCINE: ICD-10-CM

## 2021-06-04 DIAGNOSIS — Z00.00 ROUTINE GENERAL MEDICAL EXAMINATION AT A HEALTH CARE FACILITY: Primary | ICD-10-CM

## 2021-06-04 DIAGNOSIS — Z11.59 NEED FOR HEPATITIS C SCREENING TEST: ICD-10-CM

## 2021-06-04 DIAGNOSIS — Z23 NEED FOR PROPHYLACTIC VACCINATION AGAINST STREPTOCOCCUS PNEUMONIAE (PNEUMOCOCCUS): ICD-10-CM

## 2021-06-04 DIAGNOSIS — Z13.220 SCREENING FOR HYPERLIPIDEMIA: ICD-10-CM

## 2021-06-04 LAB
CHOLESTEROL, TOTAL: 147 MG/DL (ref 0–199)
HDLC SERPL-MCNC: 37 MG/DL (ref 40–60)
HEPATITIS C ANTIBODY INTERPRETATION: NORMAL
LDL CHOLESTEROL CALCULATED: 96 MG/DL
TRIGL SERPL-MCNC: 72 MG/DL (ref 0–150)
VLDLC SERPL CALC-MCNC: 14 MG/DL

## 2021-06-04 PROCEDURE — 4040F PNEUMOC VAC/ADMIN/RCVD: CPT | Performed by: PHYSICIAN ASSISTANT

## 2021-06-04 PROCEDURE — 36415 COLL VENOUS BLD VENIPUNCTURE: CPT | Performed by: PHYSICIAN ASSISTANT

## 2021-06-04 PROCEDURE — 90732 PPSV23 VACC 2 YRS+ SUBQ/IM: CPT | Performed by: PHYSICIAN ASSISTANT

## 2021-06-04 PROCEDURE — G0438 PPPS, INITIAL VISIT: HCPCS | Performed by: PHYSICIAN ASSISTANT

## 2021-06-04 PROCEDURE — 1123F ACP DISCUSS/DSCN MKR DOCD: CPT | Performed by: PHYSICIAN ASSISTANT

## 2021-06-04 PROCEDURE — G0009 ADMIN PNEUMOCOCCAL VACCINE: HCPCS | Performed by: PHYSICIAN ASSISTANT

## 2021-06-04 PROCEDURE — 3017F COLORECTAL CA SCREEN DOC REV: CPT | Performed by: PHYSICIAN ASSISTANT

## 2021-06-04 RX ORDER — ZOSTER VACCINE RECOMBINANT, ADJUVANTED 50 MCG/0.5
0.5 KIT INTRAMUSCULAR SEE ADMIN INSTRUCTIONS
Qty: 0.5 ML | Refills: 0 | Status: ON HOLD | OUTPATIENT
Start: 2021-06-04 | End: 2021-07-20 | Stop reason: HOSPADM

## 2021-06-04 RX ORDER — PREDNISONE 10 MG/1
5 TABLET ORAL DAILY
Qty: 30 TABLET | Refills: 5 | Status: ON HOLD
Start: 2021-06-04 | End: 2021-07-20 | Stop reason: ALTCHOICE

## 2021-06-04 ASSESSMENT — PATIENT HEALTH QUESTIONNAIRE - PHQ9
1. LITTLE INTEREST OR PLEASURE IN DOING THINGS: 1
SUM OF ALL RESPONSES TO PHQ QUESTIONS 1-9: 2
SUM OF ALL RESPONSES TO PHQ9 QUESTIONS 1 & 2: 2
SUM OF ALL RESPONSES TO PHQ QUESTIONS 1-9: 2
SUM OF ALL RESPONSES TO PHQ QUESTIONS 1-9: 2
2. FEELING DOWN, DEPRESSED OR HOPELESS: 1

## 2021-06-04 ASSESSMENT — LIFESTYLE VARIABLES: HOW OFTEN DO YOU HAVE A DRINK CONTAINING ALCOHOL: 0

## 2021-06-04 NOTE — PROGRESS NOTES
Medicare Annual Wellness Visit  Name: Anthony Grewal Date: 2021   MRN: [id number] Sex: Female   Age: 70 y.o. Ethnicity: Non-/Non    : 1950 Race: Lidya Hernandez is here for Medicare AWV (Pt here for medicare awv)    Screenings for behavioral, psychosocial and functional/safety risks, and cognitive dysfunction are all negative except as indicated below. These results, as well as other patient data from the [address] form, are documented in Flowsheets linked to this Encounter. Allergies   Allergen Reactions    Seasonal Itching         Prior to Visit Medications    Medication Sig Taking?  Authorizing Provider   predniSONE (DELTASONE) 10 MG tablet Take 0.5 tablets by mouth daily Yes Darlin Peña PA-C   zoster recombinant adjuvanted vaccine [facility]) 50 MCG/0.5ML SUSR injection Inject 0.5 mLs into the muscle See Admin Instructions 1 dose now and repeat in 2-6 months Yes Darlin Peña PA-C   atorvastatin (LIPITOR) 40 MG tablet Take 1 tablet by mouth daily Yes Haris Moore MD   isosorbide mononitrate (IMDUR) 60 MG extended release tablet Take 0.5 tablets by mouth daily Yes Darlin Peña PA-C   lisinopril (PRINIVIL;ZESTRIL) 20 MG tablet Take 0.5 tablets by mouth daily Yes Darlin Peña PA-C   busPIRone (BUSPAR) 5 MG tablet Take 1 tablet by mouth 2 times daily Yes Historical Provider, MD   montelukast (SINGULAIR) 10 MG tablet Take 1 tablet by mouth daily Yes Historical Provider, MD   Arformoterol Tartrate (BROVANA) 15 MCG/2ML NEBU Take 1 ampule by nebulization 2 times daily Yes CHEPE Hall CNP   budesonide (PULMICORT) 0.5 MG/2ML nebulizer suspension Take 2 mLs by nebulization 2 times daily Yes CHEPE Hall CNP   doxepin (SINEQUAN) 10 MG capsule Take 1 capsule by mouth nightly Yes Darlin Peña PA-C   nitroGLYCERIN (NITROSTAT) 0.4 MG SL tablet Place 1 tablet under the tongue every 5 minutes as needed for Chest pain Yes Annita Rebolledo (Including outside providers/suppliers regularly involved in providing care):   Patient Care Team:  Mahalia Bamberger, PA-C as PCP - General (Physician Assistant)  Mahalia Bamberger[facility]-C [facility]  Jen Ridley MD as Consulting Physician (Cardiology)  Sowmya Shukla (Pulmonology)    Wt Readings from Last 3 Encounters:   06/04/21 120 lb 6.4 oz (54.6 kg)   05/12/21 118 lb (53.5 kg)   03/08/21 113 lb (51.3 kg)     Vitals:    06/04/21 1408   BP: (!) 140/82   Site: Left Upper Arm   Position: Sitting   Cuff Size: Medium Adult   Pulse: 75   Resp: 24   Temp: 98.4 °F (36.9 °C)   TempSrc: Infrared   SpO2: 94%   Weight: 120 lb 6.4 oz (54.6 kg)   Height: 5' 6\" (1.676 m)     Body mass index is 19.43 kg/m². Based upon direct observation of the patient, evaluation of cognition reveals recent and remote memory intact. General Appearance: alert and oriented to person, place and time, well-developed and well-nourished, in no acute distress  Pulmonary/Chest: clear to auscultation bilaterally- no wheezes, rales or rhonchi, normal air movement, no respiratory distress  Cardiovascular: normal rate, normal S1 and S2, no gallops, intact distal pulses and no carotid bruits  Neurologic: gait and coordination normal and speech normal    Patient's complete Health Risk Assessment and screening values have been reviewed and are found in Flowsheets. The following problems were reviewed today and where indicated follow up appointments were made and/or referrals ordered. Positive Risk Factor Screenings with Interventions:            General Health and ACP:  General  In general, how would you say your health is?: Fair  In the past 7 days, have you experienced any of the following?  New or Increased Pain, New or Increased Fatigue, Loneliness, Social Isolation, Stress or Anger?: (!) Anger  Do you get the social and emotional support that you need?: Yes  Do you have a Living Will?: (!) No  Advance Directives     Power of  Living Will ACP-Advance Directive ACP-Power of     Not on File Not on File Not on File Not on File      General Health Risk Interventions:  Pt reports anger due to not being able to participate in normal activities due to severe COPD/oxygen use. · Anger: patient declines any further evaluation/treatment for this issue  · No Living Will: Advance Care Planning addressed with patient today    Health Habits/Nutrition:  Health Habits/Nutrition  Do you exercise for at least 20 minutes 2-3 times per week?: (!) No  Have you lost any weight without trying in the past 3 months?: No  Do you eat only one meal per day?: No  Have you seen the dentist within the past year?: N/A - wear dentures  Body mass index: 19.43  Health Habits/Nutrition Interventions:  · Inadequate physical activity:  patient is not ready to increase his/her physical activity level at this time    Hearing/Vision:  No exam data present  Hearing/Vision  Do you or your family notice any trouble with your hearing that hasn't been managed with hearing aids?: (!) Yes  Do you have difficulty driving, watching TV, or doing any of your daily activities because of your eyesight?: No  Have you had an eye exam within the past year?: (!) No  Hearing/Vision Interventions:  · Hearing concerns:  patient declines any further evaluation/treatment for hearing issues  · Vision concerns:  patient encouraged to make appointment with his/her eye specialist     ADL:  ADLs  In the past 7 days, did you need help from others to perform any of the following everyday activities? Eating, dressing, grooming, bathing, toileting, or walking/balance?: (!) Dressing, Grooming, Bathing, Walking/Balance  In the past 7 days, did you need help from others to take care of any of the following?  Laundry, housekeeping, banking/finances, shopping, telephone use, food preparation, transportation, or taking medications?: (!) Taking Medications, Transportation, Food Preparation, Telephone Use, Shopping, Banking/Finances, Housekeeping, Laundry  ADL Interventions:  ·  helps with ADLs    Personalized Preventive Plan   Current Health Maintenance Status  Immunization History   Administered Date(s) Administered    COVID-19, Moderna, PF, 100mcg/0.5mL 03/07/2021, 04/12/2021    Influenza, Quadv, IM, PF (6 mo and older Fluzone, Flulaval, Fluarix, and 3 yrs and older Afluria) 11/22/2017    Pneumococcal Conjugate 13-valent (Sturgeon Chough) 06/10/2017, 02/18/2020    Pneumococcal Polysaccharide ([id number]) 06/04/2021        Health Maintenance   Topic Date Due    DTaP/Tdap/Td vaccine (1 - Tdap) Never done    Breast cancer screen  Never done    Shingles Vaccine (1 of 2) Never done    Colon cancer screen colonoscopy  Never done    DEXA (modify frequency per FRAX score)  Never done    A1C test (Diabetic or Prediabetic)  05/16/2016    Annual Wellness Visit (AWV)  Never done    Flu vaccine (Season Ended) 09/01/2021    Low dose CT lung screening  02/09/2022    Potassium monitoring  03/05/2022    Creatinine monitoring  03/05/2022    Lipid screen  06/04/2022    Pneumococcal 65+ years Vaccine  Completed    COVID-19 Vaccine  Completed    Hepatitis C screen  Completed    Hepatitis A vaccine  Aged Out    Hepatitis B vaccine  Aged Out    Hib vaccine  Aged Out    Meningococcal (ACWY) vaccine  Aged Out     Recommendations for [facility] Due: see orders and patient instructions/AVS.  . Recommended screening schedule for the next 5-10 years is provided to the patient in written form: see Patient Joy Martinez was seen today for medicare awv. Diagnoses and all orders for this visit:    Routine general medical examination at a health care facility    Pt concerned steroid use is causing wt gain, advised at this time-due to severe COPD-that she continue current dosing. Pt is upset and wants to stop completely.  Advised she can try cutting dose in half and if worsening sxs, increase back to 10 mg daily but pt is strongly cautioned against changing/stopping medication without discussing with pulmonology. Pt voices understanding but is adamant she needs to stop med due to wt gain. Reviewed risks/side effects if medication is stopped-pt voices understanding but plans to decrease medication at this time. Colon cancer screening  -     Cologuard (For External Results Only); Future    Need for shingles vaccine  -     zoster recombinant adjuvanted vaccine [facility]) 50 MCG/0.5ML SUSR injection; Inject 0.5 mLs into the muscle See Admin Instructions 1 dose now and repeat in 2-6 months    Need for hepatitis C screening test  -     Cancel: Hepatitis C Antibody; Future  -     HEPATITIS C ANTIBODY    Need for prophylactic vaccination against Streptococcus pneumoniae (pneumococcus)  -     Pneumococcal polysaccharide vaccine 23-valent PPSV23    Screening for hyperlipidemia  -     Cancel: Lipid, Fasting; Future  -     LIPID PANEL    Other orders  -     predniSONE (DELTASONE) 10 MG tablet;  Take 0.5 tablets by mouth daily

## 2021-06-04 NOTE — PATIENT INSTRUCTIONS
Advance Directives: Care Instructions  Overview  An advance directive is a legal way to state your wishes at the end of your life. It tells your family and your doctor what to do if you can't say what you want. There are two main types of advance directives. You can change them any time your wishes change. Living will. This form tells your family and your doctor your wishes about life support and other treatment. The form is also called a declaration. Medical power of . This form lets you name a person to make treatment decisions for you when you can't speak for yourself. This person is called a health care agent (health care proxy, health care surrogate). The form is also called a durable power of  for health care. If you do not have an advance directive, decisions about your medical care may be made by a family member, or by a doctor or a  who doesn't know you. It may help to think of an advance directive as a gift to the people who care for you. If you have one, they won't have to make tough decisions by themselves. Follow-up care is a key part of your treatment and safety. Be sure to make and go to all appointments, and call your doctor if you are having problems. It's also a good idea to know your test results and keep a list of the medicines you take. What should you include in an advance directive? Many states have a unique advance directive form. (It may ask you to address specific issues.) Or you might use a universal form that's approved by many states. If your form doesn't tell you what to address, it may be hard to know what to include in your advance directive. Use the questions below to help you get started. · Who do you want to make decisions about your medical care if you are not able to? · What life-support measures do you want if you have a serious illness that gets worse over time or can't be cured? · What are you most afraid of that might happen? (Maybe you're afraid of having pain, losing your independence, or being kept alive by machines.)  · Where would you prefer to die? (Your home? A hospital? A nursing home?)  · Do you want to donate your organs when you die? · Do you want certain Yarsani practices performed before you die? When should you call for help? Be sure to contact your doctor if you have any questions. Where can you learn more? Go to https://[email or web address] org and sign in to your [employer] account. Enter R264 in the Careers[employer] box to learn more about \"Advance Directives: Care Instructions. \"     If you do not have an account, please click on the \"Sign Up Now\" link. Current as of: July 17, 2020               Content Version: 12.8  © 2006-2021 Healthwise, [employer]. Care instructions adapted under license by [facility] ([facility]). If you have questions about a medical condition or this instruction, always ask your healthcare professional. [place] [address] any warranty or liability for your use of this information. Learning About Medical Power of   What is a medical power of ? A medical power of , also called a durable power of  for health care, is one type of the legal forms called advance directives. It lets you name the person you want to make treatment decisions for you if you can't speak or decide for yourself. The person you choose is called your health care agent. This person is also called a health care proxy or health care surrogate. A medical power of  may be called something else in your state. How do you choose a health care agent? Choose your health care agent carefully. This person may or may not be a family member. Talk to the person before you make your final decision. Make sure he or she is comfortable with this responsibility. It's a good idea to choose someone who:  · Is at least 25years old.   · Knows you well and understands what makes life meaningful for you. · Understands your Moravian and moral values. · Will do what you want, not what he or she wants. · Will be able to make difficult choices at a stressful time. · Will be able to refuse or stop treatment, if that is what you would want, even if you could die. · Will be firm and confident with health professionals if needed. · Will ask questions to get needed information. · Lives near you or agrees to travel to you if needed. Your family may help you make medical decisions while you can still be part of that process. But it's important to choose one person to be your health care agent in case you aren't able to make decisions for yourself. If you don't fill out the legal form and name a health care agent, the decisions your family can make may be limited. A health care agent may be called something else in your state. Who will make decisions for you if you don't have a health care agent? If you don't have a health care agent or a living will, you may not get the care you want. Decisions may be made by family members who disagree about your medical care. Or decisions may be made by a medical professional who doesn't know you well. In some cases, a  makes the decisions. When you name a health care agent, it is very clear who has the power to make health decisions for you. How do you name a health care agent? You name your health care agent on a legal form. This form is usually called a medical power of . Ask your hospital, state bar association, or office on aging where to find these forms. You must sign the form to make it legal. Some states require you to get the form notarized. This means that a person called a  watches you sign the form and then he or she signs the form. Some states also require that two or more witnesses sign the form. Be sure to tell your family members and doctors who your health care agent is.   Where can you learn more? Go to https://chpepiceweb.[email or web address] org and sign in to your [employer] account. Enter [id number] in the [facility] box to learn more about \"Learning About [address]. \"     If you do not have an account, please click on the \"Sign Up Now\" link. Current as of: July 17, 2020               Content Version: 12.8  © 2006-2021 [employer]. Care instructions adapted under license by [facility] ([facility]). If you have questions about a medical condition or this instruction, always ask your healthcare professional. [address] any warranty or liability for your use of this information. Learning About Living Brigid Scheuermann  What is a living will? A living will, also called a declaration, is a legal form. It tells your family and your doctor your wishes when you can't speak for yourself. It's used by the health professionals who will treat you as you near the end of your life or if you get seriously hurt or ill. If you put your wishes in writing, your loved ones and others will know what kind of care you want. They won't need to guess. This can ease your mind and be helpful to others. And you can change or cancel your living will at any time. A living will is not the same as an estate or property will. An estate will explains what you want to happen with your money and property after you die. How do you use it? A living will is used to describe the kinds of treatment or life support you want as you near the end of your life or if you get seriously hurt or ill. Keep these facts in mind about living ortiz. · Your living will is used only if you can't speak or make decisions for yourself. Most often, one or more doctors must certify that you can't speak or decide for yourself before your living will takes effect. · If you get better and can speak for yourself again, you can accept or refuse any treatment.  It doesn't matter what you said in your living will. · Some states may limit your right to refuse treatment in certain cases. For example, you may need to clearly state in your living will that you don't want artificial hydration and nutrition, such as being fed through a tube. Is a living will a legal document? A living will is a legal document. Each state has its own laws about living ortiz. And a living will may be called something else in your state. Here are some things to know about living ortiz. · You don't need an  to complete a living will. But legal advice can be helpful if your state's laws are unclear. It can also help if your health history is complicated or your family can't agree on what should be in your living will. · You can change your living will at any time. Some people find that their wishes about end-of-life care change as their health changes. If you make big changes to your living will, complete a new form. · If you move to another state, make sure that your living will is legal in the state where you now live. In most cases, doctors will respect your wishes even if you have a form from a different state. · You might use a universal form that has been approved by many states. This kind of form can sometimes be filled out and stored online. Your digital copy will then be available wherever you have a connection to the internet. The doctors and nurses who need to treat you can find it right away. · Your state may offer an online registry. This is another place where you can store your living will online. · It's a good idea to get your living will notarized. This means using a person called a  to watch two people sign, or witness, your living will. What should you know when you create a living will? Here are some questions to ask yourself as you make your living will:  · Do you know enough about life support methods that might be used?  If not, talk to your doctor so you know what might be done if you can't breathe on your own, your heart stops, or you can't swallow. · What things would you still want to be able to do after you receive life-support methods? Would you want to be able to walk? To speak? To eat on your own? To live without the help of machines? · Do you want certain Scientologist practices performed if you become very ill? · If you have a choice, where do you want to be cared for? In your home? At a hospital or nursing home? · If you have a choice at the end of your life, where would you prefer to die? At home? In a hospital or nursing home? Somewhere else? · Would you prefer to be buried or cremated? · Do you want your organs to be donated after you die? What should you do with your living will? · Make sure that your family members and your health care agent have copies of your living will (also called a declaration). · Give your doctor a copy of your living will. Ask him or her to keep it as part of your medical record. If you have more than one doctor, make sure that each one has a copy. · Put a copy of your living will where it can be easily found. For example, some people may put a copy on their refrigerator door. If you are using a digital copy, be sure your doctor, family members, and health care agent know how to find and access it. Where can you learn more? Go to https://chpepiceweb.[email or web address] org and sign in to your "[employer] account. Enter O691 in the [facility] box to learn more about \"Learning About Living Hailey Anaya. \"     If you do not have an account, please click on the \"Sign Up Now\" link. Current as of: July 17, 2020               Content Version: 12.8  © 0559-7164 Healthwise, Inc[employer]. Care instructions adapted under license by [facility] ([facility]).  If you have questions about a medical condition or this instruction, always ask your healthcare professional. [address] any warranty or liability for your use of this information. Personalized Preventive Plan for Gia Bowen - 6/4/2021  Medicare offers a range of preventive health benefits. Some of the tests and screenings are paid in full while other may be subject to a deductible, co-insurance, and/or copay. Some of these benefits include a comprehensive review of your medical history including lifestyle, illnesses that may run in your family, and various assessments and screenings as appropriate. After reviewing your medical record and screening and assessments performed today your provider may have ordered immunizations, labs, imaging, and/or referrals for you. A list of these orders (if applicable) as well as your Preventive Care list are included within your After Visit Summary for your review. Other Preventive Recommendations:    · A preventive eye exam performed by an eye specialist is recommended every 1-2 years to screen for glaucoma; cataracts, macular degeneration, and other eye disorders. · A preventive dental visit is recommended every 6 months. · Try to get at least 150 minutes of exercise per week or 10,000 steps per day on a pedometer . · Order or download the FREE \"Exercise & Physical Activity: Your Everyday Guide\" from [employer]. Call 6-406.404.6863 or search [employer] online. · You need 5968-6559 mg of calcium and 1188-3854 IU of vitamin D per day. It is possible to meet your calcium requirement with diet alone, but a vitamin D supplement is usually necessary to meet this goal.  · When exposed to the sun, use a sunscreen that protects against both UVA and UVB radiation with an SPF of 30 or greater. Reapply every 2 to 3 hours or after sweating, drying off with a towel, or swimming. · Always wear a seat belt when traveling in a car. Always wear a helmet when riding a bicycle or motorcycle.

## 2021-06-07 PROBLEM — Z00.00 ROUTINE GENERAL MEDICAL EXAMINATION AT A HEALTH CARE FACILITY: Status: ACTIVE | Noted: 2021-06-07

## 2021-06-07 PROBLEM — Z11.59 NEED FOR HEPATITIS C SCREENING TEST: Status: ACTIVE | Noted: 2021-06-07

## 2021-06-07 PROBLEM — Z12.11 COLON CANCER SCREENING: Status: ACTIVE | Noted: 2021-06-07

## 2021-06-07 PROBLEM — Z23 NEED FOR PROPHYLACTIC VACCINATION AGAINST STREPTOCOCCUS PNEUMONIAE (PNEUMOCOCCUS): Status: ACTIVE | Noted: 2021-06-07

## 2021-06-07 PROBLEM — Z23 NEED FOR SHINGLES VACCINE: Status: ACTIVE | Noted: 2021-06-07

## 2021-06-07 PROBLEM — Z13.220 SCREENING FOR HYPERLIPIDEMIA: Status: ACTIVE | Noted: 2021-06-07

## 2021-06-07 RX ORDER — CARVEDILOL 12.5 MG/1
TABLET ORAL
Qty: 60 TABLET | Refills: 3 | Status: SHIPPED | OUTPATIENT
Start: 2021-06-07 | End: 2021-08-12 | Stop reason: SDUPTHER

## 2021-06-07 RX ORDER — LISINOPRIL 20 MG/1
10 TABLET ORAL DAILY
Qty: 45 TABLET | Refills: 3 | OUTPATIENT
Start: 2021-06-07 | End: 2022-06-07

## 2021-06-08 RX ORDER — LISINOPRIL 20 MG/1
10 TABLET ORAL DAILY
Qty: 45 TABLET | Refills: 3 | Status: SHIPPED | OUTPATIENT
Start: 2021-06-08 | End: 2021-11-18

## 2021-07-06 ENCOUNTER — TELEPHONE (OUTPATIENT)
Dept: FAMILY MEDICINE CLINIC | Age: 71
End: 2021-07-06

## 2021-07-06 RX ORDER — DOXEPIN HYDROCHLORIDE 10 MG/1
10 CAPSULE ORAL NIGHTLY
Qty: 30 CAPSULE | Refills: 5 | Status: SHIPPED | OUTPATIENT
Start: 2021-07-06 | End: 2022-01-05

## 2021-07-06 RX ORDER — BUSPIRONE HYDROCHLORIDE 5 MG/1
5 TABLET ORAL 2 TIMES DAILY
Qty: 60 TABLET | Refills: 5 | Status: SHIPPED | OUTPATIENT
Start: 2021-07-06 | End: 2022-01-10

## 2021-07-06 RX ORDER — MONTELUKAST SODIUM 10 MG/1
10 TABLET ORAL DAILY
Qty: 30 TABLET | Refills: 5 | Status: SHIPPED | OUTPATIENT
Start: 2021-07-06 | End: 2022-02-15

## 2021-07-07 PROBLEM — Z00.00 ROUTINE GENERAL MEDICAL EXAMINATION AT A HEALTH CARE FACILITY: Status: RESOLVED | Noted: 2021-06-07 | Resolved: 2021-07-07

## 2021-07-07 PROBLEM — Z13.220 SCREENING FOR HYPERLIPIDEMIA: Status: RESOLVED | Noted: 2021-06-07 | Resolved: 2021-07-07

## 2021-07-07 PROBLEM — Z12.11 COLON CANCER SCREENING: Status: RESOLVED | Noted: 2021-06-07 | Resolved: 2021-07-07

## 2021-07-07 PROBLEM — Z11.59 NEED FOR HEPATITIS C SCREENING TEST: Status: RESOLVED | Noted: 2021-06-07 | Resolved: 2021-07-07

## 2021-07-13 DIAGNOSIS — Z12.11 COLON CANCER SCREENING: ICD-10-CM

## 2021-07-13 NOTE — RESULT ENCOUNTER NOTE
My chart    Hello Mrs. Shanelle New; Your Cologuard result is negative for any concerning findings; we can plan on repeating Cologuard in 3 years. Please let me know if you have any other questions or concerns.   Thanks,  Asim wilkins

## 2021-07-19 ENCOUNTER — APPOINTMENT (OUTPATIENT)
Dept: GENERAL RADIOLOGY | Age: 71
DRG: 643 | End: 2021-07-19
Payer: MEDICARE

## 2021-07-19 ENCOUNTER — APPOINTMENT (OUTPATIENT)
Dept: CT IMAGING | Age: 71
DRG: 643 | End: 2021-07-19
Payer: MEDICARE

## 2021-07-19 ENCOUNTER — HOSPITAL ENCOUNTER (INPATIENT)
Age: 71
LOS: 1 days | Discharge: LEFT BEFORE DISCHARGE | DRG: 643 | End: 2021-07-20
Attending: EMERGENCY MEDICINE | Admitting: INTERNAL MEDICINE
Payer: MEDICARE

## 2021-07-19 DIAGNOSIS — R11.2 NON-INTRACTABLE VOMITING WITH NAUSEA, UNSPECIFIED VOMITING TYPE: ICD-10-CM

## 2021-07-19 DIAGNOSIS — J47.1 BRONCHIECTASIS WITH (ACUTE) EXACERBATION (HCC): ICD-10-CM

## 2021-07-19 DIAGNOSIS — J44.9 CHRONIC OBSTRUCTIVE PULMONARY DISEASE, UNSPECIFIED COPD TYPE (HCC): Primary | ICD-10-CM

## 2021-07-19 DIAGNOSIS — E87.1 HYPONATREMIA: ICD-10-CM

## 2021-07-19 PROBLEM — J96.21 ACUTE ON CHRONIC RESPIRATORY FAILURE WITH HYPOXIA (HCC): Status: ACTIVE | Noted: 2021-07-19

## 2021-07-19 LAB
ALBUMIN SERPL-MCNC: 4 GM/DL (ref 3.4–5)
ALP BLD-CCNC: 57 IU/L (ref 40–129)
ALT SERPL-CCNC: 11 U/L (ref 10–40)
ANION GAP SERPL CALCULATED.3IONS-SCNC: 6 MMOL/L (ref 4–16)
APTT: 27.4 SECONDS (ref 25.1–37.1)
AST SERPL-CCNC: 13 IU/L (ref 15–37)
BACTERIA: NEGATIVE /HPF
BASE EXCESS MIXED: 0.8 (ref 0–2.3)
BASE EXCESS: ABNORMAL (ref 0–2.4)
BASOPHILS ABSOLUTE: 0.1 K/CU MM
BASOPHILS RELATIVE PERCENT: 1.2 % (ref 0–1)
BILIRUB SERPL-MCNC: 0.7 MG/DL (ref 0–1)
BILIRUBIN URINE: NEGATIVE MG/DL
BLOOD, URINE: NEGATIVE
BUN BLDV-MCNC: 6 MG/DL (ref 6–23)
CALCIUM SERPL-MCNC: 9 MG/DL (ref 8.3–10.6)
CAST TYPE: NORMAL /HPF
CHLORIDE BLD-SCNC: 88 MMOL/L (ref 99–110)
CHLORIDE URINE RANDOM: 69 MMOL/L (ref 43–210)
CLARITY: CLEAR
CO2 CONTENT: 23.3 MMOL/L (ref 19–24)
CO2: 28 MMOL/L (ref 21–32)
COLOR: YELLOW
CREAT SERPL-MCNC: 0.5 MG/DL (ref 0.6–1.1)
CRYSTAL TYPE: NEGATIVE /HPF
D DIMER: <200 NG/ML(DDU)
DIFFERENTIAL TYPE: ABNORMAL
EKG ATRIAL RATE: 76 BPM
EKG DIAGNOSIS: NORMAL
EKG P AXIS: 30 DEGREES
EKG P-R INTERVAL: 182 MS
EKG Q-T INTERVAL: 432 MS
EKG QRS DURATION: 144 MS
EKG QTC CALCULATION (BAZETT): 486 MS
EKG R AXIS: -60 DEGREES
EKG T AXIS: 97 DEGREES
EKG VENTRICULAR RATE: 76 BPM
EOSINOPHILS ABSOLUTE: 0.5 K/CU MM
EOSINOPHILS RELATIVE PERCENT: 4.8 % (ref 0–3)
EPITHELIAL CELLS, UA: NEGATIVE /HPF
GFR AFRICAN AMERICAN: >60 ML/MIN/1.73M2
GFR NON-AFRICAN AMERICAN: >60 ML/MIN/1.73M2
GLUCOSE BLD-MCNC: 108 MG/DL (ref 70–99)
GLUCOSE, URINE: NEGATIVE MG/DL
HCO3 ARTERIAL: 22.3 MMOL/L (ref 18–23)
HCT VFR BLD CALC: 41.7 % (ref 37–47)
HEMOGLOBIN: 14.4 GM/DL (ref 12.5–16)
IMMATURE NEUTROPHIL %: 0.4 % (ref 0–0.43)
INR BLD: 1.02 INDEX
KETONES, URINE: NEGATIVE MG/DL
LACTATE: 1.2 MMOL/L (ref 0.4–2)
LEUKOCYTE ESTERASE, URINE: NEGATIVE
LIPASE: 15 IU/L (ref 13–60)
LYMPHOCYTES ABSOLUTE: 3 K/CU MM
LYMPHOCYTES RELATIVE PERCENT: 29.2 % (ref 24–44)
MCH RBC QN AUTO: 31.8 PG (ref 27–31)
MCHC RBC AUTO-ENTMCNC: 34.5 % (ref 32–36)
MCV RBC AUTO: 92.1 FL (ref 78–100)
MONOCYTES ABSOLUTE: 1 K/CU MM
MONOCYTES RELATIVE PERCENT: 10 % (ref 0–4)
NITRITE URINE, QUANTITATIVE: NEGATIVE
O2 SATURATION: 97.3 % (ref 96–97)
PCO2 ARTERIAL: 31.9 MMHG (ref 32–45)
PDW BLD-RTO: 12.8 % (ref 11.7–14.9)
PH BLOOD: 7.45 (ref 7.34–7.45)
PH, URINE: 7.5 (ref 5–8)
PLATELET # BLD: 324 K/CU MM (ref 140–440)
PMV BLD AUTO: 9 FL (ref 7.5–11.1)
PO2 ARTERIAL: 88.4 MMHG (ref 75–100)
POTASSIUM SERPL-SCNC: 3.8 MMOL/L (ref 3.5–5.1)
POTASSIUM, UR: 16.1 MMOL/L (ref 22–119)
PROCALCITONIN: 0.04
PROTEIN UA: NEGATIVE MG/DL
PROTHROMBIN TIME: 12.7 SECONDS (ref 11.7–14.5)
RBC # BLD: 4.53 M/CU MM (ref 4.2–5.4)
RBC URINE: 1 /HPF (ref 0–6)
SARS-COV-2, NAAT: NOT DETECTED
SEGMENTED NEUTROPHILS ABSOLUTE COUNT: 5.7 K/CU MM
SEGMENTED NEUTROPHILS RELATIVE PERCENT: 54.4 % (ref 36–66)
SODIUM BLD-SCNC: 122 MMOL/L (ref 135–145)
SODIUM BLD-SCNC: 123 MMOL/L (ref 135–145)
SODIUM URINE: 82 MMOL/L (ref 35–167)
SOURCE, BLOOD GAS: ABNORMAL
SOURCE: NORMAL
SPECIFIC GRAVITY UA: 1.01 (ref 1–1.03)
TOTAL IMMATURE NEUTOROPHIL: 0.04 K/CU MM
TOTAL PROTEIN: 6.3 GM/DL (ref 6.4–8.2)
UROBILINOGEN, URINE: 0.2 MG/DL (ref 0.2–1)
WBC # BLD: 10.4 K/CU MM (ref 4–10.5)
WBC UA: NEGATIVE /HPF (ref 0–5)

## 2021-07-19 PROCEDURE — 96374 THER/PROPH/DIAG INJ IV PUSH: CPT

## 2021-07-19 PROCEDURE — 84295 ASSAY OF SERUM SODIUM: CPT

## 2021-07-19 PROCEDURE — 83935 ASSAY OF URINE OSMOLALITY: CPT

## 2021-07-19 PROCEDURE — 6370000000 HC RX 637 (ALT 250 FOR IP): Performed by: EMERGENCY MEDICINE

## 2021-07-19 PROCEDURE — 93005 ELECTROCARDIOGRAM TRACING: CPT | Performed by: EMERGENCY MEDICINE

## 2021-07-19 PROCEDURE — 84133 ASSAY OF URINE POTASSIUM: CPT

## 2021-07-19 PROCEDURE — 85379 FIBRIN DEGRADATION QUANT: CPT

## 2021-07-19 PROCEDURE — 87040 BLOOD CULTURE FOR BACTERIA: CPT

## 2021-07-19 PROCEDURE — 6360000004 HC RX CONTRAST MEDICATION: Performed by: EMERGENCY MEDICINE

## 2021-07-19 PROCEDURE — 2580000003 HC RX 258: Performed by: EMERGENCY MEDICINE

## 2021-07-19 PROCEDURE — 85730 THROMBOPLASTIN TIME PARTIAL: CPT

## 2021-07-19 PROCEDURE — 87088 URINE BACTERIA CULTURE: CPT

## 2021-07-19 PROCEDURE — 6360000002 HC RX W HCPCS: Performed by: EMERGENCY MEDICINE

## 2021-07-19 PROCEDURE — 99223 1ST HOSP IP/OBS HIGH 75: CPT | Performed by: INTERNAL MEDICINE

## 2021-07-19 PROCEDURE — 87635 SARS-COV-2 COVID-19 AMP PRB: CPT

## 2021-07-19 PROCEDURE — 71045 X-RAY EXAM CHEST 1 VIEW: CPT

## 2021-07-19 PROCEDURE — 6370000000 HC RX 637 (ALT 250 FOR IP): Performed by: PHYSICIAN ASSISTANT

## 2021-07-19 PROCEDURE — 1200000000 HC SEMI PRIVATE

## 2021-07-19 PROCEDURE — 87086 URINE CULTURE/COLONY COUNT: CPT

## 2021-07-19 PROCEDURE — 96375 TX/PRO/DX INJ NEW DRUG ADDON: CPT

## 2021-07-19 PROCEDURE — 80053 COMPREHEN METABOLIC PANEL: CPT

## 2021-07-19 PROCEDURE — 99283 EMERGENCY DEPT VISIT LOW MDM: CPT

## 2021-07-19 PROCEDURE — 83605 ASSAY OF LACTIC ACID: CPT

## 2021-07-19 PROCEDURE — 82803 BLOOD GASES ANY COMBINATION: CPT

## 2021-07-19 PROCEDURE — 83930 ASSAY OF BLOOD OSMOLALITY: CPT

## 2021-07-19 PROCEDURE — 36600 WITHDRAWAL OF ARTERIAL BLOOD: CPT

## 2021-07-19 PROCEDURE — 6370000000 HC RX 637 (ALT 250 FOR IP): Performed by: INTERNAL MEDICINE

## 2021-07-19 PROCEDURE — 36415 COLL VENOUS BLD VENIPUNCTURE: CPT

## 2021-07-19 PROCEDURE — 81001 URINALYSIS AUTO W/SCOPE: CPT

## 2021-07-19 PROCEDURE — 87186 SC STD MICRODIL/AGAR DIL: CPT

## 2021-07-19 PROCEDURE — 71250 CT THORAX DX C-: CPT

## 2021-07-19 PROCEDURE — 85025 COMPLETE CBC W/AUTO DIFF WBC: CPT

## 2021-07-19 PROCEDURE — 2580000003 HC RX 258: Performed by: INTERNAL MEDICINE

## 2021-07-19 PROCEDURE — 6360000002 HC RX W HCPCS: Performed by: INTERNAL MEDICINE

## 2021-07-19 PROCEDURE — 83690 ASSAY OF LIPASE: CPT

## 2021-07-19 PROCEDURE — 85610 PROTHROMBIN TIME: CPT

## 2021-07-19 PROCEDURE — 74177 CT ABD & PELVIS W/CONTRAST: CPT

## 2021-07-19 PROCEDURE — 84300 ASSAY OF URINE SODIUM: CPT

## 2021-07-19 PROCEDURE — 82436 ASSAY OF URINE CHLORIDE: CPT

## 2021-07-19 PROCEDURE — 84145 PROCALCITONIN (PCT): CPT

## 2021-07-19 PROCEDURE — 93010 ELECTROCARDIOGRAM REPORT: CPT | Performed by: INTERNAL MEDICINE

## 2021-07-19 PROCEDURE — 94640 AIRWAY INHALATION TREATMENT: CPT

## 2021-07-19 RX ORDER — ONDANSETRON 2 MG/ML
4 INJECTION INTRAMUSCULAR; INTRAVENOUS EVERY 6 HOURS PRN
Status: DISCONTINUED | OUTPATIENT
Start: 2021-07-19 | End: 2021-07-19

## 2021-07-19 RX ORDER — DEXAMETHASONE SODIUM PHOSPHATE 4 MG/ML
4 INJECTION, SOLUTION INTRA-ARTICULAR; INTRALESIONAL; INTRAMUSCULAR; INTRAVENOUS; SOFT TISSUE EVERY 6 HOURS
Status: DISCONTINUED | OUTPATIENT
Start: 2021-07-19 | End: 2021-07-19

## 2021-07-19 RX ORDER — ATORVASTATIN CALCIUM 40 MG/1
40 TABLET, FILM COATED ORAL DAILY
Status: DISCONTINUED | OUTPATIENT
Start: 2021-07-19 | End: 2021-07-20 | Stop reason: HOSPADM

## 2021-07-19 RX ORDER — BENZONATATE 100 MG/1
100 CAPSULE ORAL 3 TIMES DAILY
Status: DISCONTINUED | OUTPATIENT
Start: 2021-07-19 | End: 2021-07-20 | Stop reason: HOSPADM

## 2021-07-19 RX ORDER — SODIUM CHLORIDE 0.9 % (FLUSH) 0.9 %
5-40 SYRINGE (ML) INJECTION EVERY 12 HOURS SCHEDULED
Status: DISCONTINUED | OUTPATIENT
Start: 2021-07-19 | End: 2021-07-20 | Stop reason: HOSPADM

## 2021-07-19 RX ORDER — ASPIRIN 81 MG/1
81 TABLET, CHEWABLE ORAL DAILY
Status: DISCONTINUED | OUTPATIENT
Start: 2021-07-19 | End: 2021-07-20 | Stop reason: HOSPADM

## 2021-07-19 RX ORDER — POLYETHYLENE GLYCOL 3350 17 G/17G
17 POWDER, FOR SOLUTION ORAL DAILY PRN
Status: DISCONTINUED | OUTPATIENT
Start: 2021-07-19 | End: 2021-07-20 | Stop reason: HOSPADM

## 2021-07-19 RX ORDER — SODIUM CHLORIDE 9 MG/ML
INJECTION, SOLUTION INTRAVENOUS ONCE
Status: DISCONTINUED | OUTPATIENT
Start: 2021-07-19 | End: 2021-07-19

## 2021-07-19 RX ORDER — FLUOXETINE HYDROCHLORIDE 20 MG/1
40 CAPSULE ORAL DAILY
Status: ON HOLD | COMMUNITY
End: 2021-07-20 | Stop reason: HOSPADM

## 2021-07-19 RX ORDER — DEXAMETHASONE SODIUM PHOSPHATE 10 MG/ML
10 INJECTION, SOLUTION INTRAMUSCULAR; INTRAVENOUS ONCE
Status: COMPLETED | OUTPATIENT
Start: 2021-07-19 | End: 2021-07-19

## 2021-07-19 RX ORDER — LANOLIN ALCOHOL/MO/W.PET/CERES
6 CREAM (GRAM) TOPICAL NIGHTLY PRN
Status: DISCONTINUED | OUTPATIENT
Start: 2021-07-19 | End: 2021-07-20 | Stop reason: HOSPADM

## 2021-07-19 RX ORDER — 0.9 % SODIUM CHLORIDE 0.9 %
1000 INTRAVENOUS SOLUTION INTRAVENOUS ONCE
Status: COMPLETED | OUTPATIENT
Start: 2021-07-19 | End: 2021-07-19

## 2021-07-19 RX ORDER — SODIUM CHLORIDE 0.9 % (FLUSH) 0.9 %
5-40 SYRINGE (ML) INJECTION PRN
Status: DISCONTINUED | OUTPATIENT
Start: 2021-07-19 | End: 2021-07-20 | Stop reason: HOSPADM

## 2021-07-19 RX ORDER — METHYLPREDNISOLONE SODIUM SUCCINATE 40 MG/ML
40 INJECTION, POWDER, LYOPHILIZED, FOR SOLUTION INTRAMUSCULAR; INTRAVENOUS EVERY 6 HOURS
Status: DISCONTINUED | OUTPATIENT
Start: 2021-07-19 | End: 2021-07-20 | Stop reason: HOSPADM

## 2021-07-19 RX ORDER — GUAIFENESIN/DEXTROMETHORPHAN 100-10MG/5
5 SYRUP ORAL EVERY 4 HOURS PRN
Status: DISCONTINUED | OUTPATIENT
Start: 2021-07-19 | End: 2021-07-20 | Stop reason: HOSPADM

## 2021-07-19 RX ORDER — LISINOPRIL 10 MG/1
10 TABLET ORAL DAILY
Status: DISCONTINUED | OUTPATIENT
Start: 2021-07-19 | End: 2021-07-20 | Stop reason: HOSPADM

## 2021-07-19 RX ORDER — SODIUM CHLORIDE 9 MG/ML
25 INJECTION, SOLUTION INTRAVENOUS PRN
Status: DISCONTINUED | OUTPATIENT
Start: 2021-07-19 | End: 2021-07-20 | Stop reason: HOSPADM

## 2021-07-19 RX ORDER — SODIUM CHLORIDE 9 MG/ML
INJECTION, SOLUTION INTRAVENOUS CONTINUOUS
Status: DISCONTINUED | OUTPATIENT
Start: 2021-07-19 | End: 2021-07-20

## 2021-07-19 RX ORDER — MONTELUKAST SODIUM 10 MG/1
10 TABLET ORAL DAILY
Status: DISCONTINUED | OUTPATIENT
Start: 2021-07-19 | End: 2021-07-20 | Stop reason: HOSPADM

## 2021-07-19 RX ORDER — ACETAMINOPHEN 650 MG/1
650 SUPPOSITORY RECTAL EVERY 6 HOURS PRN
Status: DISCONTINUED | OUTPATIENT
Start: 2021-07-19 | End: 2021-07-20 | Stop reason: HOSPADM

## 2021-07-19 RX ORDER — ISOSORBIDE MONONITRATE 30 MG/1
30 TABLET, EXTENDED RELEASE ORAL DAILY
Status: DISCONTINUED | OUTPATIENT
Start: 2021-07-19 | End: 2021-07-20 | Stop reason: HOSPADM

## 2021-07-19 RX ORDER — NITROGLYCERIN 0.4 MG/1
0.4 TABLET SUBLINGUAL EVERY 5 MIN PRN
Status: DISCONTINUED | OUTPATIENT
Start: 2021-07-19 | End: 2021-07-20 | Stop reason: HOSPADM

## 2021-07-19 RX ORDER — BUSPIRONE HYDROCHLORIDE 5 MG/1
5 TABLET ORAL 2 TIMES DAILY
Status: DISCONTINUED | OUTPATIENT
Start: 2021-07-19 | End: 2021-07-20 | Stop reason: HOSPADM

## 2021-07-19 RX ORDER — BUDESONIDE 0.5 MG/2ML
500 INHALANT ORAL 2 TIMES DAILY
Status: DISCONTINUED | OUTPATIENT
Start: 2021-07-19 | End: 2021-07-20 | Stop reason: HOSPADM

## 2021-07-19 RX ORDER — ONDANSETRON 2 MG/ML
4 INJECTION INTRAMUSCULAR; INTRAVENOUS EVERY 6 HOURS PRN
Status: DISCONTINUED | OUTPATIENT
Start: 2021-07-19 | End: 2021-07-20 | Stop reason: HOSPADM

## 2021-07-19 RX ORDER — ACETAMINOPHEN 325 MG/1
650 TABLET ORAL EVERY 6 HOURS PRN
Status: DISCONTINUED | OUTPATIENT
Start: 2021-07-19 | End: 2021-07-20 | Stop reason: HOSPADM

## 2021-07-19 RX ORDER — CARVEDILOL 12.5 MG/1
12.5 TABLET ORAL 2 TIMES DAILY WITH MEALS
Status: DISCONTINUED | OUTPATIENT
Start: 2021-07-19 | End: 2021-07-20 | Stop reason: HOSPADM

## 2021-07-19 RX ORDER — IPRATROPIUM BROMIDE AND ALBUTEROL SULFATE 2.5; .5 MG/3ML; MG/3ML
1 SOLUTION RESPIRATORY (INHALATION)
Status: DISCONTINUED | OUTPATIENT
Start: 2021-07-19 | End: 2021-07-20 | Stop reason: HOSPADM

## 2021-07-19 RX ORDER — ONDANSETRON 4 MG/1
4 TABLET, ORALLY DISINTEGRATING ORAL EVERY 8 HOURS PRN
Status: DISCONTINUED | OUTPATIENT
Start: 2021-07-19 | End: 2021-07-20 | Stop reason: HOSPADM

## 2021-07-19 RX ORDER — PREDNISONE 20 MG/1
40 TABLET ORAL DAILY
Status: DISCONTINUED | OUTPATIENT
Start: 2021-07-22 | End: 2021-07-20 | Stop reason: HOSPADM

## 2021-07-19 RX ORDER — ONDANSETRON 2 MG/ML
4 INJECTION INTRAMUSCULAR; INTRAVENOUS ONCE
Status: COMPLETED | OUTPATIENT
Start: 2021-07-19 | End: 2021-07-19

## 2021-07-19 RX ORDER — IPRATROPIUM BROMIDE AND ALBUTEROL SULFATE 2.5; .5 MG/3ML; MG/3ML
1 SOLUTION RESPIRATORY (INHALATION) ONCE
Status: COMPLETED | OUTPATIENT
Start: 2021-07-19 | End: 2021-07-19

## 2021-07-19 RX ADMIN — AZITHROMYCIN MONOHYDRATE 500 MG: 500 INJECTION, POWDER, LYOPHILIZED, FOR SOLUTION INTRAVENOUS at 17:21

## 2021-07-19 RX ADMIN — ASPIRIN 81 MG CHEWABLE TABLET 81 MG: 81 TABLET CHEWABLE at 17:22

## 2021-07-19 RX ADMIN — BUDESONIDE 500 MCG: 0.5 SUSPENSION RESPIRATORY (INHALATION) at 19:13

## 2021-07-19 RX ADMIN — IOPAMIDOL 100 ML: 755 INJECTION, SOLUTION INTRAVENOUS at 14:45

## 2021-07-19 RX ADMIN — SODIUM CHLORIDE: 9 INJECTION, SOLUTION INTRAVENOUS at 20:00

## 2021-07-19 RX ADMIN — ONDANSETRON 4 MG: 2 INJECTION INTRAMUSCULAR; INTRAVENOUS at 13:19

## 2021-07-19 RX ADMIN — ENOXAPARIN SODIUM 40 MG: 40 INJECTION SUBCUTANEOUS at 17:22

## 2021-07-19 RX ADMIN — SODIUM CHLORIDE 1000 ML: 9 INJECTION, SOLUTION INTRAVENOUS at 13:18

## 2021-07-19 RX ADMIN — LISINOPRIL 10 MG: 10 TABLET ORAL at 17:22

## 2021-07-19 RX ADMIN — ATORVASTATIN CALCIUM 40 MG: 40 TABLET, FILM COATED ORAL at 17:21

## 2021-07-19 RX ADMIN — MONTELUKAST SODIUM 10 MG: 10 TABLET, FILM COATED ORAL at 17:22

## 2021-07-19 RX ADMIN — METHYLPREDNISOLONE SODIUM SUCCINATE 40 MG: 40 INJECTION, POWDER, FOR SOLUTION INTRAMUSCULAR; INTRAVENOUS at 17:23

## 2021-07-19 RX ADMIN — IPRATROPIUM BROMIDE AND ALBUTEROL SULFATE 1 AMPULE: .5; 3 SOLUTION RESPIRATORY (INHALATION) at 12:42

## 2021-07-19 RX ADMIN — PIPERACILLIN AND TAZOBACTAM 3375 MG: 3; .375 INJECTION, POWDER, LYOPHILIZED, FOR SOLUTION INTRAVENOUS at 18:29

## 2021-07-19 RX ADMIN — METHYLPREDNISOLONE SODIUM SUCCINATE 40 MG: 40 INJECTION, POWDER, FOR SOLUTION INTRAMUSCULAR; INTRAVENOUS at 23:08

## 2021-07-19 RX ADMIN — BENZONATATE 100 MG: 100 CAPSULE ORAL at 20:00

## 2021-07-19 RX ADMIN — IPRATROPIUM BROMIDE AND ALBUTEROL SULFATE 1 AMPULE: .5; 3 SOLUTION RESPIRATORY (INHALATION) at 19:14

## 2021-07-19 RX ADMIN — DEXAMETHASONE SODIUM PHOSPHATE 10 MG: 10 INJECTION, SOLUTION INTRAMUSCULAR; INTRAVENOUS at 13:19

## 2021-07-19 RX ADMIN — ONDANSETRON 4 MG: 2 INJECTION INTRAMUSCULAR; INTRAVENOUS at 16:02

## 2021-07-19 RX ADMIN — BUSPIRONE HYDROCHLORIDE 5 MG: 5 TABLET ORAL at 20:00

## 2021-07-19 RX ADMIN — ISOSORBIDE MONONITRATE 30 MG: 30 TABLET, EXTENDED RELEASE ORAL at 17:21

## 2021-07-19 RX ADMIN — CARVEDILOL 12.5 MG: 12.5 TABLET, FILM COATED ORAL at 17:22

## 2021-07-19 RX ADMIN — Medication 6 MG: at 23:08

## 2021-07-19 ASSESSMENT — PAIN DESCRIPTION - FREQUENCY: FREQUENCY: CONTINUOUS

## 2021-07-19 ASSESSMENT — PAIN DESCRIPTION - ORIENTATION: ORIENTATION: LOWER

## 2021-07-19 ASSESSMENT — PAIN DESCRIPTION - LOCATION: LOCATION: BACK

## 2021-07-19 ASSESSMENT — PAIN DESCRIPTION - DESCRIPTORS: DESCRIPTORS: ACHING

## 2021-07-19 NOTE — PROGRESS NOTES
Admission skin assessment completed with Jose Farias. Skin intact. No areas of concern reported or observed.

## 2021-07-19 NOTE — ED PROVIDER NOTES
Triage Chief Complaint:   Back Pain (States thinks she has a bladder infection and a COPD flair up. States began about 3 days ago with back pain and lower abd pain. States has had a temp of 99.1. States her breathing has been getting worse for the last few weeks.  is supposed to wear oxygen, but left it in the car. ), Abdominal Pain, and Shortness of Breath      [place]:  Phyllis Villalobos is a 70 y.o. female that presents to the emergency department stating she has some pain across her lower back and suprapubic regions and believes she has a bladder infection. States she also feels she is having a COPD flareup. States she began feeling this way 3 days ago. States her breathing has been worse over the last few days. She is supposed to wear 2 L of oxygen all the time but showed up to the ER with no oxygen on. She states she has had nausea and vomiting. No diarrhea. Mild lower abdominal pain. No sick contacts. Chest pain. No productive cough. The highest her temp has gotten was 99. .  States pain is abdomen is 5 out of 10. States that it makes it better or worse. Past Medical History:   Diagnosis Date    Arthritis     Asthma     CAD (coronary artery disease)     COPD (chronic obstructive pulmonary disease) ([facility] [address]     H/O Doppler ultrasound (Bilateral Carotid) 04/26/2018    No hemodynamically significant stenosis noted in the internal carotid artery bilaterally.  H/O echocardiogram 03/07/2017; 12/8/2020    Left ventricular systolic function is moderately depressed with EF 35-40%. Mild-Moderate Pulmonary HTN.  History of nuclear stress test 03/07/2017; 11/3/2020    LVEF is low, EF 40%. Normal perfusion study.    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    [place] to LAD & Diag, SVG to Post lat RCA & Ramus     Past Surgical History:   Procedure Laterality Date    APPENDECTOMY      CARDIAC SURGERY      HYSTERECTOMY      INTRACAPSULAR CATARACT EXTRACTION Left 2019    EYE CATARACT EMULSIFICATION IOL IMPLANT performed by Frank Caballero MD at [address] [facility] [address]      TONSILLECTOMY       Family History   Problem Relation Age of Onset    Other Mother         copd, emphysema    Colon Cancer Sister     Other Brother         copd, agent orange    Other Brother         agent orange     Social History     Socioeconomic History    Marital status:      Spouse name: Not on file    Number of children: Not on file    Years of education: Not on file    Highest education level: Not on file   Occupational History    Not on file   Tobacco Use    Smoking status: Former Smoker     Packs/day: 1.00     Years: 40.00     Pack years: 40.00     Types: Cigarettes     Quit date: 5/15/2015     Years since quittin.1    Smokeless tobacco: Never Used   Vaping Use    Vaping Use: Never used   Substance and Sexual Activity    Alcohol use: No     Alcohol/week: 0.0 standard drinks    Drug use: No    Sexual activity: Yes     Partners: Male   Other Topics Concern    Not on file   Social History Narrative    ** Merged History Encounter **          Social Determinants of Health     Financial Resource Strain:     Difficulty of Paying Living Expenses:    Food Insecurity:     Worried About Running Out of Food in the Last Year:     Ran Out of Food in the Last Year:    Transportation Needs:     Lack of Transportation (Medical):      Lack of Transportation (Non-Medical):    Physical Activity:     Days of Exercise per Week:     Minutes of Exercise per Session:    Stress:     Feeling of Stress :    Social Connections:     Frequency of Communication with Friends and Family:     Frequency of Social Gatherings with Friends and Family:     Attends Sikh Services:     Active Member of Clubs or Organizations:     Attends Club or Organization Meetings:     Marital Status:    Intimate Partner Violence:     Fear of Current or Ex-Partner:     Emotionally Abused:     Physically Abused:     Sexually Abused:      Current Facility-Administered Medications   Medication Dose Route Frequency Provider Last Rate Last Admin    ondansetron (ZOFRAN) injection 4 mg  4 mg Intravenous Q6H PRN Kahlil Childs MD   4 mg at 07/19/21 1319    ondansetron (ZOFRAN) injection 4 mg  4 mg Intravenous Once Kahlil Childs MD        albuterol (PROVENTIL) nebulizer solution 2.5 mg  2.5 mg Nebulization Once CHEPE Durham CNP        albuterol (PROVENTIL) nebulizer solution 2.5 mg  2.5 mg Nebulization Once CHEPE Durham CNP         Current Outpatient Medications   Medication Sig Dispense Refill    busPIRone (BUSPAR) 5 MG tablet Take 1 tablet by mouth 2 times daily 60 tablet 5    montelukast (SINGULAIR) 10 MG tablet Take 1 tablet by mouth daily 30 tablet 5    doxepin (SINEQUAN) 10 MG capsule Take 1 capsule by mouth nightly 30 capsule 5    lisinopril (PRINIVIL;ZESTRIL) 20 MG tablet Take 0.5 tablets by mouth daily 45 tablet 3    carvedilol (COREG) 12.5 MG tablet TAKE 1 TABLET BY MOUTH TWICE DAILY WITH MEALS 60 tablet 3    predniSONE (DELTASONE) 10 MG tablet Take 0.5 tablets by mouth daily 30 tablet 5    zoster recombinant adjuvanted vaccine (SHINGRIX) 50 MCG/0.5ML SUSR injection Inject 0.5 mLs into the muscle See Admin Instructions 1 dose now and repeat in 2-6 months 0.5 mL 0    atorvastatin (LIPITOR) 40 MG tablet Take 1 tablet by mouth daily 90 tablet 3    isosorbide mononitrate (IMDUR) 60 MG extended release tablet Take 0.5 tablets by mouth daily 30 tablet 2    Arformoterol Tartrate (BROVANA) 15 MCG/2ML NEBU Take 1 ampule by nebulization 2 times daily 120 mL 5    budesonide (PULMICORT) 0.5 MG/2ML nebulizer suspension Take 2 mLs by nebulization 2 times daily 30 ampule 5    nitroGLYCERIN (NITROSTAT) 0.4 MG SL tablet Place 1 tablet under the tongue every 5 minutes as needed for Chest pain 25 tablet 3    albuterol sulfate  (90 Base) MCG/ACT inhaler Inhale 2 puffs into the lungs every 6 hours as needed for Wheezing      Melatonin 10 MG CAPS Take 10 mg by mouth as needed      ipratropium-albuterol (DUONEB) 0.5-2.5 (3) MG/3ML SOLN nebulizer solution Take 3 mLs by nebulization 4 times daily 360 mL 0    docusate sodium (COLACE) 100 MG capsule Take 100 mg by mouth as needed       aspirin 81 MG chewable tablet Take 1 tablet by mouth daily 30 tablet 3     Allergies   Allergen Reactions    Seasonal Itching     Nursing Notes Reviewed    ROS:  At least 10 systems reviewed and otherwise negative except as in the [place]. Physical Exam:  ED Triage Vitals [07/19/21 1202]   Enc Vitals Group      BP (!) 143/82      Pulse 82      Resp 28      Temp       Temp Source Oral      SpO2 93 %      Weight 115 lb (52.2 kg)      Height 5' 6\" (1.676 m)      Head Circumference       Peak Flow       Pain Score       Pain Loc       Pain Edu? Excl. in [address]? My pulse oximetry interpretation is which is within the normal range    GENERAL APPEARANCE: Awake and alert. Cooperative. No acute distress. HEAD: Normocephalic. Atraumatic. EYES: EOM's grossly intact. Sclera anicteric. ENT: Mucous membranes are moist. Tolerates saliva. No trismus. NECK: Supple. No meningismus. Trachea midline. HEART: RRR. Radial pulses 2+. LUNGS: Respirations unlabored. Expiratory wheezing. Speaks in full sentences. ABDOMEN: Soft. With suprapubic tenderness. Normal bowel sounds. . No guarding or rebound. EXTREMITIES: No acute deformities. SKIN: Warm and dry. NEUROLOGICAL: No gross facial drooping. Moves all 4 extremities spontaneously. Patient is awake, alert, oriented x4. PSYCHIATRIC: Normal mood.     I have reviewed and interpreted all of the currently available lab results from this visit (if applicable):  Results for orders placed or performed during the hospital encounter of 07/19/21   COVID-19, Rapid    Specimen: Nasopharyngeal   Result Value Ref Range Source THROAT     SARS-CoV-2, NAAT NOT DETECTED NOT DETECTED   CBC with Auto Diff   Result Value Ref Range    WBC 10.4 4.0 - 10.5 K/CU MM    RBC 4.53 4.2 - 5.4 M/CU MM    Hemoglobin 14.4 12.5 - 16.0 GM/DL    Hematocrit 41.7 37 - 47 %    MCV 92.1 78 - 100 FL    MCH 31.8 (H) 27 - 31 PG    MCHC 34.5 32.0 - 36.0 %    RDW 12.8 11.7 - 14.9 %    Platelets 588 047 - 637 K/CU MM    MPV 9.0 7.5 - 11.1 FL    Differential Type AUTOMATED DIFFERENTIAL     Segs Relative 54.4 36 - 66 %    Lymphocytes % 29.2 24 - 44 %    Monocytes % 10.0 (H) 0 - 4 %    Eosinophils % 4.8 (H) 0 - 3 %    Basophils % 1.2 (H) 0 - 1 %    Segs Absolute 5.7 K/CU MM    Lymphocytes Absolute 3.0 K/CU MM    Monocytes Absolute 1.0 K/CU MM    Eosinophils Absolute 0.5 K/CU MM    Basophils Absolute 0.1 K/CU MM    Immature Neutrophil % 0.4 0 - 0.43 %    Total Immature Neutrophil 0.04 K/CU MM   CMP   Result Value Ref Range    Sodium 122 (L) 135 - 145 MMOL/L    Potassium 3.8 3.5 - 5.1 MMOL/L    Chloride 88 (L) 99 - 110 mMol/L    CO2 28 21 - 32 MMOL/L    BUN 6 6 - 23 MG/DL    CREATININE 0.5 (L) 0.6 - 1.1 MG/DL    Glucose 108 (H) 70 - 99 MG/DL    Calcium 9.0 8.3 - 10.6 MG/DL    Albumin 4.0 3.4 - 5.0 GM/DL    Total Protein 6.3 (L) 6.4 - 8.2 GM/DL    Total Bilirubin 0.7 0.0 - 1.0 MG/DL    ALT 11 10 - 40 U/L    AST 13 (L) 15 - 37 IU/L    Alkaline Phosphatase 57 40 - 129 IU/L    GFR Non-African American >60 >60 mL/min/1.73m2    GFR African American >60 >60 mL/min/1.73m2    Anion Gap 6 4 - 16   Lipase   Result Value Ref Range    Lipase 15 13 - 60 IU/L   Urinalysis with microscopic   Result Value Ref Range    Color, UA YELLOW YELLOW    Clarity, UA CLEAR CLEAR    Glucose, Urine NEGATIVE NEGATIVE MG/DL    Bilirubin Urine NEGATIVE NEGATIVE MG/DL    Ketones, Urine NEGATIVE NEGATIVE MG/DL    Specific Gravity, UA 1.010 1.001 - 1.035    Blood, Urine NEGATIVE NEGATIVE    pH, Urine 7.5 5.0 - 8.0    Protein, UA NEGATIVE NEGATIVE MG/DL    Urobilinogen, Urine 0.2 0.2 - 1.0 MG/DL ORDERING SYSTEM PROVIDED HISTORY: abd pain, nausea, vomiting, thinks has UTI   TECHNOLOGIST PROVIDED HISTORY:   Reason for exam:->abd pain, nausea, vomiting, thinks has UTI   Additional Contrast?->None   Decision Support Exception - unselect if not a suspected or confirmed   emergency medical condition->Emergency Medical Condition (MA)   Reason for Exam: abd pain   Acuity: Acute   Type of Exam: Initial   Additional signs and symptoms: 100ml isovue 370 @ rt anti @ 1455hrs, gfr>60,   creat 0.5 7-, abd pain, nausea, vomiting, thinks has UTI     FINDINGS:   Lower Chest: Emphysematous disease and bronchiectasis in the lung bases.  No   acute airspace disease identified. Organs: Subcentimeter hypoattenuating liver lesions are noted, too small to   characterize, although several of these were visualized on remote chest CT   imaging likely representing a benign process.  The gallbladder, pancreas,   spleen, adrenals and kidneys reveal no acute findings.  Incidental right   splenic artery aneurysm measuring 1 cm, which appears unchanged since at   least 2018. GI/Bowel: There is no bowel dilatation or wall thickening identified. Diverticulosis. Pelvis: No acute soft tissue abnormality identified.  The bladder is well   distended without wall thickening or inflammatory change. Peritoneum/Retroperitoneum: No free air or free fluid.  The aorta is normal   in caliber.  The visceral branches are patent. Calcified atheromatous plaque   is present.  No lymphadenopathy. Bones/Soft Tissues: Sclerosis in the sacral ala consistent with subacute or   chronic sacral insufficiency fractures.  Multilevel chronic appearing   vertebral body height loss in the lower thoracic and lower lumbar spine with   relative sparing of T12.                              XR CHEST PORTABLE (Final result)  Result time 07/19/21 15:12:55  Final result by Sameer Escalante MD (07/19/21 15:12:55)                Impression:    1. Interstitial prominence and bibasilar opacity, which could be due to   atelectasis or pneumonia. 2. Small left pleural effusion. Narrative:    EXAMINATION:   ONE XRAY VIEW OF THE CHEST     7/19/2021 2:20 pm     COMPARISON:   12/14/2020     HISTORY:   ORDERING SYSTEM PROVIDED HISTORY: COPD< states feels SOB   TECHNOLOGIST PROVIDED HISTORY:   Reason for exam:->COPD< states feels SOB   Reason for Exam: copd   Acuity: Chronic   Type of Exam: Unknown   Additional signs and symptoms: COPD< states feels SOB     FINDINGS:   Prior sternotomy.  Cardiomediastinal silhouette is unchanged in size.  Aortic   tortuosity and atherosclerosis.  Trace left pleural effusion.  No   pneumothorax.  There is interstitial prominence with bibasilar opacity. Lungs are mildly hyperinflated.  No acute osseous abnormality.  Degenerative   changes in both shoulders.                       [] Discussed with Radiologist:     [] The following radiograph was interpreted by myself in the absence of a radiologist:     EKG: (All EKG's are interpreted by myself in the absence of a cardiologist)  The Ekg interpreted by me shows  normal sinus rhythm with a rate of 76  Axis is   Left axis deviation  QTc is  normal  LBBB  ST Segments: depression in  v6  No significant change from prior EKG dated 5-      MDM:  Patient is normotensive. Afebrile. Heart rate in the 80s. Placed on her home 2 L of oxygen. Sats are within normal limits. Patient started on Decadron IV as well as a DuoNeb treatment. We will start her on fluids and Zofran as well. EKG shows old left bundle branch block. No acute changes. . CBC shows a white count of 10.4. Hemoglobin of 14.4. Hyponatremia of 122. Chloride is 88. CO2 is 28. Anion gap is normal at 6. Lipase is 15. Lactic acid is 1.2. Urinalysis is clear. Urine culture pending, covid negative. CXR interstitial prominence and bibasilar opacity that could be due to atelectasis.   Patient does not have a fever, white count, productive cough so do not feel this is pneumonia. Small left pleural effusion. . CT abdomen shows no acute inflammatory process. No bowel obstruction. Chronic saline insufficiency fractures. Multilevel chronic compression deformities in the lower thoracic and lumbar spine. Discussed results with patient but my concern is with her sodium being that low. It was rerun by lab and found to be the same. Do not see that she is any obvious medications that would cause this. Called the hospitalist who agreed to admit for further eval and treatment. Patient agreeable to plan    Clinical Impression:  1. Chronic obstructive pulmonary disease, unspecified COPD type ([facility] [address]    2. Hyponatremia        Disposition Vitals:  [unfilled], [unfilled], [unfilled], [unfilled]    Disposition referral (if applicable):  No follow-up provider specified.     Disposition medications (if applicable):  New Prescriptions    No medications on file         (Please note that portions of this note may have been completed with a voice recognition program. Efforts were made to edit the dictations but occasionally words are mis-transcribed.)    MD Estela Metzger MD  07/19/21 5210

## 2021-07-19 NOTE — ED TRIAGE NOTES
Arrived per w/c to room 4 for triage. Tolerated without difficulty. Bed in lowest position. Call light given.  Gowned for exam.

## 2021-07-19 NOTE — H&P
Department of Internal Medicine  [facility] MEDICINE  Attending Physician History and Physical    PCP: Donte Buenrostro  MRN: [id number]    CHIEF COMPLAINT: Shortness of breath with cough, nausea and vomiting, subjective fever and chills  Reason for Admission: For further work-up and management of an acute hyponatremia and probable pneumonia  History Obtained From:  patient, electronic medical record, and ED provider    HISTORY OF PRESENT ILLNESS:    The patient Lisa Hoover is a 70 y.o. female with significant past medical history of essential hypertension, COPD with chronic respiratory failure on home oxygen 2 L/min via nasal cannula, CAD status post CABG, Dyslipidemia, Systolic Dysfunction with no mention of CHF; who presents with subjective fever and chills, cough, shortness of breath, nausea, vomiting with abdominal discomfort. This has been ongoing for the past week to 2 as per patient. She denies any hemoptysis, hematemesis, melena or hematochezia, diarrhea, dizziness. She was found to be hypoxemic at the ED since she was not on her usual home oxygen upon presentation. She was also found to have hyponatremia. She was given 1 L of normal saline and was admitted onto the hospitalist service for further work-up and management. CT abdomen and pelvis did show bronchiectasis of the visible lower lung lobes, as well as a subacute or chronic sacral insufficiency fractures as well as multilevel compression deformities in the lower thoracic and lumbar spine new since 2018. Past Medical History:        Diagnosis Date    Arthritis     Asthma     CAD (coronary artery disease)     [facility]     H/O Doppler ultrasound (Bilateral Carotid) 04/26/2018    No hemodynamically significant stenosis noted in the internal carotid artery bilaterally.  H/O echocardiogram 03/07/2017; 12/8/2020    Left ventricular systolic function is moderately depressed with EF 35-40%. Mild-Moderate Pulmonary HTN.  History of nuclear stress test 03/07/2017; 11/3/2020    LVEF is low, EF 40%. Normal perfusion study. 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    [place] to LAD & Diag, SVG to Post lat RCA & Ramus       Past Surgical History:        Procedure Laterality Date    APPENDECTOMY      CARDIAC SURGERY      HYSTERECTOMY      INTRACAPSULAR CATARACT EXTRACTION Left 9/26/2019    EYE CATARACT EMULSIFICATION IOL IMPLANT performed by Margarita Martinez MD at [address] [facility] SINUS SURGERY      TONSILLECTOMY         Immunizations:              Influenza:  Up-to-date            Pneumococcal Polysaccharide:  Up-to-date; approximate date: 7/19/2021    Medications Prior to Admission:    Prior to Admission medications    Medication Sig Start Date End Date Taking?  Authorizing Provider   busPIRone (BUSPAR) 5 MG tablet Take 1 tablet by mouth 2 times daily 7/6/21   Frank Menezes PA-C   montelukast (SINGULAIR) 10 MG tablet Take 1 tablet by mouth daily 7/6/21   Frank Menezes PA-C   doxepin [facility]) 10 MG capsule Take 1 capsule by mouth nightly 7/6/21   Frank Menezes PA-C   lisinopril (PRINIVIL;ZESTRIL) 20 MG tablet Take 0.5 tablets by mouth daily 6/8/21 6/8/22  Sergo Restrepo MD   carvedilol (COREG) 12.5 MG tablet TAKE 1 TABLET BY MOUTH TWICE DAILY WITH MEALS 6/7/21   Alicia Matos MD   predniSONE (DELTASONE) 10 MG tablet Take 0.5 tablets by mouth daily 6/4/21   Frank Menezes PA-C   zoster recombinant adjuvanted vaccine [facility]) 50 MCG/0.5ML SUSR injection Inject 0.5 mLs into the muscle See Admin Instructions 1 dose now and repeat in 2-6 months 6/4/21 12/1/21  Frank Meenzes PA-C   atorvastatin (LIPITOR) 40 MG tablet Take 1 tablet by mouth daily 5/12/21 5/12/22  Sergo Restrepo MD   isosorbide mononitrate (IMDUR) 60 MG extended release tablet Take 0.5 tablets by mouth daily 3/22/21 3/22/22  Frank Menezes PA-C   Arformoterol Tartrate (BROVANA) 15 MCG/2ML NEBU Take 1 ampule by nebulization 2 times daily 1/25/21   Royal Epp CHEPE Ibarra CNP   budesonide (PULMICORT) 0.5 MG/2ML nebulizer suspension Take 2 mLs by nebulization 2 times daily 1/25/21   Royal Epp CHEPE Ibarra - CNP   nitroGLYCERIN (NITROSTAT) 0.4 MG SL tablet Place 1 tablet under the tongue every 5 minutes as needed for Chest pain 1/5/21   Donte Paredes PA-C   albuterol sulfate  (90 Base) MCG/ACT inhaler Inhale 2 puffs into the lungs every 6 hours as needed for Wheezing    Historical Provider, MD   Melatonin 10 MG CAPS Take 10 mg by mouth as needed    Historical Provider, MD   ipratropium-albuterol (DUONEB) 0.5-2.5 (3) MG/3ML SOLN nebulizer solution Take 3 mLs by nebulization 4 times daily 11/23/17   Randa Singer MD   docusate sodium (COLACE) 100 MG capsule Take 100 mg by mouth as needed     Historical Provider, MD   aspirin 81 MG chewable tablet Take 1 tablet by mouth daily 6/3/15   Abhay Miranda MD       Allergies:  Seasonal    Social History:   TOBACCO:   reports that she quit smoking about 6 years ago. Her smoking use included cigarettes. She has a 40.00 pack-year smoking history. She has never used smokeless tobacco.  ETOH:   reports no history of alcohol use. DRUGS:   reports no history of drug use. DOMESTIC VIOLENCE:  no  ACTIVITIES OF DAILY LIVING:  Patient is able to perform all activities of daily living. INSTRUMENTAL ACTIVITIES OF DAILY LIVING:  Patient is able to perform all instrumental activities of daily living. OCCUPATION:  Retired    Code Status:  Full code    Family History:       Problem Relation Age of Onset    Other Mother         copd, emphysema    Colon Cancer Sister     Other Brother         copd, agent orange    Other Brother         agent orange       Review of Systems:  ALL SYSTEMS WERE REVIEWED AND WERE UNREMARKABLE, WITH THE PERTINENT POSITIVES AND NEGATIVES AS DOCUMENTED IN THE HPI.     PHYSICAL EXAM:  Vital Signs: BP (!) 143/82   Pulse 82   Resp 28   Ht 5' 6\" (1.676 m)   Wt 115 lb (52.2 kg)   LMP  (LMP Unknown)   SpO2 93%   BMI 18.56 kg/m²     General appearance: alert, appears older than stated age, cachectic, cooperative, fatigued, flushed, moderate distress and pale  Head: Normocephalic, without obvious abnormality, atraumatic  Eyes: conjunctivae/corneas clear. PERRL, EOM's intact. Fundi benign. Ears: normal TM's and external ear canals both ears  Nose: Nares normal. Septum midline. Mucosa normal. No drainage or sinus tenderness. Throat: Dry lips with dry oral mucosa  Neck: no adenopathy, no carotid bruit, no JVD, supple, symmetrical, trachea midline and thyroid not enlarged, symmetric, no tenderness/mass/nodules  Back: symmetric, no curvature. ROM normal. No CVA tenderness. Lungs: Bibasilar coarse crackles with isolated wheezes  Heart: regular rate and rhythm, S1, S2 normal, no murmur, click, rub or gallop  Abdomen: soft, non-tender; bowel sounds normal; no masses,  no organomegaly  Extremities: extremities normal, atraumatic, no cyanosis or edema  Pulses: 2+ and symmetric  Skin: Skin color, texture, turgor normal. No rashes or lesions  Neurologic: Alert and oriented X 3, normal strength and tone. Normal symmetric reflexes.  Normal coordination and gait      DATA:  CBC with Differential:    Lab Results   Component Value Date    WBC 10.4 07/19/2021    RBC 4.53 07/19/2021    HGB 14.4 07/19/2021    HCT 41.7 07/19/2021     07/19/2021    MCV 92.1 07/19/2021    MCH 31.8 07/19/2021    MCHC 34.5 07/19/2021    RDW 12.8 07/19/2021    SEGSPCT 54.4 07/19/2021    LYMPHOPCT 29.2 07/19/2021    MONOPCT 10.0 07/19/2021    EOSPCT 0.6 10/20/2011    BASOPCT 1.2 07/19/2021    MONOSABS 1.0 07/19/2021    LYMPHSABS 3.0 07/19/2021    EOSABS 0.5 07/19/2021    BASOSABS 0.1 07/19/2021    DIFFTYPE AUTOMATED DIFFERENTIAL 07/19/2021     CMP:    Lab Results   Component Value Date     07/19/2021    K 3.8 07/19/2021    CL 88 07/19/2021 maintain on oxygen via nasal cannula to maintain oxygen saturation between 89 and 93% to prevent CO2 retention. ABG did not show any hypercapnia or acidosis and D-dimer is within normal limits showing no PE. This probably may be secondary to the exacerbation of her baseline bronchitis and management as indicated above. Acute on Chronic Hyponatremia   Patient has chronic hyponatremia and the acute exacerbation could be secondary to the nausea and vomiting with increased production of ADH. Patient is also on DOXEPIN which has been noted to cause SIADH and therefore we will keep it on hold. Initial allowable range of replacement in a 24-hour duration is 8 mmol/L, beyond which this will need to be reversed. Awaiting work-up including but not limited to serum osmolality, urine osmolality, urine electrolytes. We will monitor serum sodium every 4 hours. Patient [facility] be [facility] for [facility]rology input if there is no improvement. Active Problems:    HTN (hypertension)  Resume home antihypertensive regimen, however hold all antihypertensives especially diuretics that can affect serum sodium level. CAD (coronary artery disease) S/P CABG x 4  Patient currently asymptomatic, resume home regimen and monitor closely under telemetry. Pulmonary HTN ([address]  Continue with oxygen supplementation, patient will benefit from follow-up with pulmonology consult upon discharge. Nausea and vomiting  With abdominal pain of unclear etiology. Patient has incessant cough leading to the nausea and vomiting and therefore control the cough. CT abdomen and pelvis with no etiology to explain this presentation. We will also optimize control of the nausea and vomiting. IV fluid will be started based on patient's serum sodium levels. Resolved Problems:    * No resolved hospital problems. *    Patient is being maintained in-house at least overnight, pending further investigation.   We will keep a closer look at the sodium, and if it does not improve within the next 24 hours, patient will be transferred to [facility] for nephrology input.       Electronically signed by Wanda Kramer MD, MD.

## 2021-07-20 VITALS
DIASTOLIC BLOOD PRESSURE: 82 MMHG | OXYGEN SATURATION: 95 % | WEIGHT: 115.3 LBS | SYSTOLIC BLOOD PRESSURE: 187 MMHG | HEART RATE: 78 BPM | RESPIRATION RATE: 16 BRPM | TEMPERATURE: 96.8 F | HEIGHT: 66 IN | BODY MASS INDEX: 18.53 KG/M2

## 2021-07-20 LAB
ALBUMIN SERPL-MCNC: 3.6 GM/DL (ref 3.4–5)
ALP BLD-CCNC: 48 IU/L (ref 40–129)
ALT SERPL-CCNC: 8 U/L (ref 10–40)
ANION GAP SERPL CALCULATED.3IONS-SCNC: 5 MMOL/L (ref 4–16)
ANION GAP SERPL CALCULATED.3IONS-SCNC: 5 MMOL/L (ref 4–16)
AST SERPL-CCNC: 14 IU/L (ref 15–37)
BASOPHILS ABSOLUTE: 0 K/CU MM
BASOPHILS RELATIVE PERCENT: 0.3 % (ref 0–1)
BILIRUB SERPL-MCNC: 0.6 MG/DL (ref 0–1)
BUN BLDV-MCNC: 7 MG/DL (ref 6–23)
BUN BLDV-MCNC: 9 MG/DL (ref 6–23)
CALCIUM SERPL-MCNC: 8.3 MG/DL (ref 8.3–10.6)
CALCIUM SERPL-MCNC: 8.4 MG/DL (ref 8.3–10.6)
CHLORIDE BLD-SCNC: 93 MMOL/L (ref 99–110)
CHLORIDE BLD-SCNC: 94 MMOL/L (ref 99–110)
CO2: 25 MMOL/L (ref 21–32)
CO2: 26 MMOL/L (ref 21–32)
CREAT SERPL-MCNC: 0.4 MG/DL (ref 0.6–1.1)
CREAT SERPL-MCNC: 0.4 MG/DL (ref 0.6–1.1)
DIFFERENTIAL TYPE: ABNORMAL
EOSINOPHILS ABSOLUTE: 0 K/CU MM
EOSINOPHILS RELATIVE PERCENT: 0.1 % (ref 0–3)
GFR AFRICAN AMERICAN: >60 ML/MIN/1.73M2
GFR AFRICAN AMERICAN: >60 ML/MIN/1.73M2
GFR NON-AFRICAN AMERICAN: >60 ML/MIN/1.73M2
GFR NON-AFRICAN AMERICAN: >60 ML/MIN/1.73M2
GLUCOSE BLD-MCNC: 138 MG/DL (ref 70–99)
GLUCOSE BLD-MCNC: 142 MG/DL (ref 70–99)
HCT VFR BLD CALC: 38.1 % (ref 37–47)
HEMOGLOBIN: 12.7 GM/DL (ref 12.5–16)
IMMATURE NEUTROPHIL %: 0.6 % (ref 0–0.43)
LACTATE: 1.2 MMOL/L (ref 0.4–2)
LYMPHOCYTES ABSOLUTE: 2.2 K/CU MM
LYMPHOCYTES RELATIVE PERCENT: 27.5 % (ref 24–44)
MCH RBC QN AUTO: 31.6 PG (ref 27–31)
MCHC RBC AUTO-ENTMCNC: 33.3 % (ref 32–36)
MCV RBC AUTO: 94.8 FL (ref 78–100)
MONOCYTES ABSOLUTE: 0.2 K/CU MM
MONOCYTES RELATIVE PERCENT: 3 % (ref 0–4)
PDW BLD-RTO: 12.8 % (ref 11.7–14.9)
PLATELET # BLD: 296 K/CU MM (ref 140–440)
PMV BLD AUTO: 9.6 FL (ref 7.5–11.1)
POTASSIUM SERPL-SCNC: 3.9 MMOL/L (ref 3.5–5.1)
POTASSIUM SERPL-SCNC: 3.9 MMOL/L (ref 3.5–5.1)
PRO-BNP: 2097 PG/ML
PROCALCITONIN: 0.04
RBC # BLD: 4.02 M/CU MM (ref 4.2–5.4)
SEGMENTED NEUTROPHILS ABSOLUTE COUNT: 5.4 K/CU MM
SEGMENTED NEUTROPHILS RELATIVE PERCENT: 68.5 % (ref 36–66)
SODIUM BLD-SCNC: 123 MMOL/L (ref 135–145)
SODIUM BLD-SCNC: 124 MMOL/L (ref 135–145)
SODIUM BLD-SCNC: 125 MMOL/L (ref 135–145)
SODIUM BLD-SCNC: 130 MMOL/L (ref 136–145)
TOTAL IMMATURE NEUTOROPHIL: 0.05 K/CU MM
TOTAL PROTEIN: 5.5 GM/DL (ref 6.4–8.2)
WBC # BLD: 7.9 K/CU MM (ref 4–10.5)

## 2021-07-20 PROCEDURE — 2580000003 HC RX 258: Performed by: INTERNAL MEDICINE

## 2021-07-20 PROCEDURE — 99223 1ST HOSP IP/OBS HIGH 75: CPT | Performed by: PSYCHIATRY & NEUROLOGY

## 2021-07-20 PROCEDURE — 84295 ASSAY OF SERUM SODIUM: CPT

## 2021-07-20 PROCEDURE — 6370000000 HC RX 637 (ALT 250 FOR IP): Performed by: PSYCHIATRY & NEUROLOGY

## 2021-07-20 PROCEDURE — 84145 PROCALCITONIN (PCT): CPT

## 2021-07-20 PROCEDURE — 80048 BASIC METABOLIC PNL TOTAL CA: CPT

## 2021-07-20 PROCEDURE — 85025 COMPLETE CBC W/AUTO DIFF WBC: CPT

## 2021-07-20 PROCEDURE — 97165 OT EVAL LOW COMPLEX 30 MIN: CPT

## 2021-07-20 PROCEDURE — 83880 ASSAY OF NATRIURETIC PEPTIDE: CPT

## 2021-07-20 PROCEDURE — 94640 AIRWAY INHALATION TREATMENT: CPT

## 2021-07-20 PROCEDURE — 80053 COMPREHEN METABOLIC PANEL: CPT

## 2021-07-20 PROCEDURE — 6370000000 HC RX 637 (ALT 250 FOR IP): Performed by: INTERNAL MEDICINE

## 2021-07-20 PROCEDURE — 2700000000 HC OXYGEN THERAPY PER DAY

## 2021-07-20 PROCEDURE — 97161 PT EVAL LOW COMPLEX 20 MIN: CPT

## 2021-07-20 PROCEDURE — 6360000002 HC RX W HCPCS: Performed by: INTERNAL MEDICINE

## 2021-07-20 PROCEDURE — 36415 COLL VENOUS BLD VENIPUNCTURE: CPT

## 2021-07-20 PROCEDURE — 6370000000 HC RX 637 (ALT 250 FOR IP): Performed by: HOSPITALIST

## 2021-07-20 PROCEDURE — 83605 ASSAY OF LACTIC ACID: CPT

## 2021-07-20 RX ORDER — AZITHROMYCIN 500 MG/1
500 TABLET, FILM COATED ORAL DAILY
Qty: 3 TABLET | Refills: 0 | Status: SHIPPED | OUTPATIENT
Start: 2021-07-20 | End: 2021-07-23

## 2021-07-20 RX ORDER — PREDNISONE 20 MG/1
TABLET ORAL
Qty: 15 TABLET | Refills: 0 | Status: SHIPPED | OUTPATIENT
Start: 2021-07-22 | End: 2021-08-01

## 2021-07-20 RX ORDER — CEFDINIR 300 MG/1
300 CAPSULE ORAL 2 TIMES DAILY
Qty: 14 CAPSULE | Refills: 0 | Status: SHIPPED | OUTPATIENT
Start: 2021-07-20 | End: 2021-07-27

## 2021-07-20 RX ORDER — PREDNISONE 10 MG/1
10 TABLET ORAL DAILY
Status: ON HOLD | COMMUNITY
End: 2021-07-20 | Stop reason: HOSPADM

## 2021-07-20 RX ADMIN — PIPERACILLIN AND TAZOBACTAM 3375 MG: 3; .375 INJECTION, POWDER, LYOPHILIZED, FOR SOLUTION INTRAVENOUS at 01:06

## 2021-07-20 RX ADMIN — BUDESONIDE 500 MCG: 0.5 SUSPENSION RESPIRATORY (INHALATION) at 07:16

## 2021-07-20 RX ADMIN — CARVEDILOL 12.5 MG: 12.5 TABLET, FILM COATED ORAL at 09:55

## 2021-07-20 RX ADMIN — SERTRALINE 50 MG: 50 TABLET, FILM COATED ORAL at 11:55

## 2021-07-20 RX ADMIN — Medication 15 G: at 11:55

## 2021-07-20 RX ADMIN — ASPIRIN 81 MG CHEWABLE TABLET 81 MG: 81 TABLET CHEWABLE at 09:56

## 2021-07-20 RX ADMIN — IPRATROPIUM BROMIDE AND ALBUTEROL SULFATE 1 AMPULE: .5; 3 SOLUTION RESPIRATORY (INHALATION) at 07:16

## 2021-07-20 RX ADMIN — ONDANSETRON 4 MG: 2 INJECTION INTRAMUSCULAR; INTRAVENOUS at 09:51

## 2021-07-20 RX ADMIN — ATORVASTATIN CALCIUM 40 MG: 40 TABLET, FILM COATED ORAL at 09:56

## 2021-07-20 RX ADMIN — METHYLPREDNISOLONE SODIUM SUCCINATE 40 MG: 40 INJECTION, POWDER, FOR SOLUTION INTRAMUSCULAR; INTRAVENOUS at 11:55

## 2021-07-20 RX ADMIN — ISOSORBIDE MONONITRATE 30 MG: 30 TABLET, EXTENDED RELEASE ORAL at 09:56

## 2021-07-20 RX ADMIN — BENZONATATE 100 MG: 100 CAPSULE ORAL at 09:56

## 2021-07-20 RX ADMIN — ENOXAPARIN SODIUM 40 MG: 40 INJECTION SUBCUTANEOUS at 09:56

## 2021-07-20 RX ADMIN — LISINOPRIL 10 MG: 10 TABLET ORAL at 09:56

## 2021-07-20 RX ADMIN — IPRATROPIUM BROMIDE AND ALBUTEROL SULFATE 1 AMPULE: .5; 3 SOLUTION RESPIRATORY (INHALATION) at 10:51

## 2021-07-20 RX ADMIN — ACETAMINOPHEN 650 MG: 325 TABLET ORAL at 02:53

## 2021-07-20 RX ADMIN — ONDANSETRON 4 MG: 2 INJECTION INTRAMUSCULAR; INTRAVENOUS at 02:54

## 2021-07-20 RX ADMIN — METHYLPREDNISOLONE SODIUM SUCCINATE 40 MG: 40 INJECTION, POWDER, FOR SOLUTION INTRAMUSCULAR; INTRAVENOUS at 05:50

## 2021-07-20 RX ADMIN — BUSPIRONE HYDROCHLORIDE 5 MG: 5 TABLET ORAL at 09:56

## 2021-07-20 RX ADMIN — MONTELUKAST SODIUM 10 MG: 10 TABLET, FILM COATED ORAL at 09:56

## 2021-07-20 RX ADMIN — PIPERACILLIN AND TAZOBACTAM 3375 MG: 3; .375 INJECTION, POWDER, LYOPHILIZED, FOR SOLUTION INTRAVENOUS at 10:03

## 2021-07-20 ASSESSMENT — PAIN SCALES - GENERAL: PAINLEVEL_OUTOF10: 3

## 2021-07-20 NOTE — PROGRESS NOTES
Occupational Therapy   Occupational Therapy Initial Assessment  Date: 2021   Patient Name: Phyllis Villalobos  MRN: [id number]     : 1950    Date of Service: 2021    Discharge Recommendations:  Home with assist PRN       Assessment   Performance deficits / Impairments: Decreased safe awareness  Assessment: 69 yo female admitted with bronchierasis. She presents with some SOB and was nauseated earlier. She was S/MI for transfers and was able to don her socks independently. She is being discharged home today  Prognosis: Good  Decision Making: Low Complexity  History: see above  Exam: see above  OT Education: OT Role;Transfer Training  REQUIRES OT FOLLOW UP: No  Activity Tolerance  Activity Tolerance: Patient Tolerated treatment well  Safety Devices  Safety Devices in place: Yes           Patient Diagnosis(es): The primary encounter diagnosis was Chronic obstructive pulmonary disease, unspecified COPD type ([facility] Diagnoses of Hyponatremia, Non-intractable vomiting with nausea, unspecified vomiting type, and Bronchiectasis with (acute) exacerbation ([address] were also pertinent to this visit. has a past medical history of Arthritis, Asthma, CAD (coronary artery disease), COPD (chronic obstructive pulmonary disease) ([address] H/O Doppler ultrasound (Bilateral Carotid), H/O echocardiogram, History of nuclear stress test, Hyperlipidemia, Hypertension, Mitral regurgitation, and S/P CABG x 4.   has a past surgical history that includes knee surgery; sinus surgery; Appendectomy; Hysterectomy; Tonsillectomy; Cardiac surgery; and Intracapsular cataract extraction (Left, 2019).            Restrictions  Restrictions/Precautions  Restrictions/Precautions: General Precautions  Required Braces or Orthoses?: No  Position Activity Restriction  Other position/activity restrictions: 2L 02, IV, tele    Subjective   General  Patient assessed for rehabilitation services?: Yes  Patient Currently in Pain: No  Vital Signs  Patient Currently in Pain: No  Oxygen Therapy  SpO2: 95 %  O2 Device: Nasal cannula  O2 Flow Rate (L/min): 2 L/min  Social/Functional History  Social/Functional History  Lives With: Spouse  Type of Home: House  Home Layout: One level  Home Access: Stairs to enter without rails  Entrance Stairs - Number of Steps: 1  Bathroom Shower/Tub: Tub/Shower unit, Shower chair with back  Bathroom Toilet: Standard (raiser)  Bathroom Equipment:  (stick on grab bars)  Home Equipment: Rolling walker (3 points on cane)  Receives Help From: Family  ADL Assistance: Needs assistance ( helps out of tub)  Homemaking Responsibilities: No ( does it)  Ambulation Assistance: Needs assistance (3 prong cane, RW)  Active : No  Occupation: Retired  Type of occupation: nurse's aide  Leisure & Hobbies: read       Objective   Vision: Impaired  Vision Exceptions: Wears glasses for reading (cataract sx)  Hearing: Exceptions to [facility]  Hearing Exceptions: Hard of hearing/hearing concerns    Orientation  Overall Orientation Status: Within Functional Limits  Observation/Palpation  Posture: Fair  Observation: Pt was supine in bed HOB elevated, on oxygen and tele/continuous pulse ox  Balance  Sitting Balance: Modified independent   Standing Balance: Modified independent   Functional Mobility  Functional - Mobility Device: Rolling Walker  Assist Level: Supervision  Functional Mobility Comments: poor walker safety; holds onto furniture  ADL  Feeding: Setup  Grooming: Setup  UE Bathing: Supervision; Independent (judging from functional AROM)  LE Dressing: Supervision (socks)  Tone RUE  RUE Tone: Normotonic  Tone LUE  LUE Tone: Normotonic  Coordination  Movements Are Fluid And Coordinated: Yes        Transfers  Sit to stand: Supervision;Modified independent  Stand to sit: Modified independent     Cognition  Overall Cognitive Status: Exceptions  Safety Judgement: Decreased awareness of need for safety;Decreased awareness of need for assistance  Insights: Decreased awareness of deficits        Sensation  Overall Sensation Status: WFL        LUE AROM (degrees)  LUE AROM : WFL  RUE AROM (degrees)  RUE AROM : WFL  LUE Strength  Gross LUE Strength: WFL  RUE Strength  Gross RUE Strength: WFL                   Plan   Plan  Times per week: no OT needs identified    G-Code     OutComes Score                                                  AM-PAC Score             Goals  Short term goals  Time Frame for Short term goals: Pt is being discharged home  Patient Goals   Patient goals : to go home       Therapy Time   Individual Concurrent Group Co-treatment   Time In       0920   Time Out       0940   Minutes       [address] Sha[address] Maravilla OT

## 2021-07-20 NOTE — CARE COORDINATION
CM met with the patient for discharge planning. Patient lives at home with her , has insurance with Rx coverage & PCP, stated that she was independent with ADL's, and no longer drives ( provides transportation as needed). Patient uses a cane & walker at home (mostly the cane inside the home), an inhaler, nebulizer machine, and home oxygen (2 l/nc). Patient plans to return home upon discharge and is unable to identify any needs at this time. Patient stated that she would like to go home because she \"cannot rest here\". CM available if needs arise.

## 2021-07-20 NOTE — PLAN OF CARE
Problem: Falls - Risk of:  Goal: Will remain free from falls  Outcome: Completed  Goal: Absence of physical injury  Outcome: Completed

## 2021-07-20 NOTE — DISCHARGE SUMMARY
Discharge Summary      Patient ID: Phyllis Villalobos      Patient's PCP: Sisi Chavarria PA-C    Admit Date: 7/19/2021     Discharge Date:  7/20/21    Admitting Provider: Dary Win MD    Discharging Provider: Amie Salmeron DO     Reason for this admission:  Shortness of breath with cough, nausea and vomiting, subjective fever and chills    Discharge Diagnoses: Active Hospital Problems    Diagnosis Date Noted    Acute hyponatremia [E87.1] 07/19/2021    Acute on chronic respiratory failure with hypoxia (HCC) [J96.21] 07/19/2021    Bronchiectasis with (acute) exacerbation (HCC) [J47.1] 07/19/2021    Pulmonary HTN ([address] [I27.20] 11/21/2017    Chronic Hyponatremia [E87.1] 11/21/2017    CAD (coronary artery disease) [I25.10]     S/P CABG x 4 [Z95.1] 05/18/2015    HTN (hypertension) [I10] 05/16/2015       Procedures: none    Consults:   IP CONSULT TO HOSPITALIST  PULMONARY REHAB EVALUATION  IP CONSULT TO CASE MANAGEMENT  IP CONSULT TO PSYCHIATRY      Briefly: On admission, \"76 y.o. female with significant past medical history of essential hypertension, COPD with chronic respiratory failure on home oxygen 2 L/min via nasal cannula, CAD status post CABG,  Dyslipidemia,  Systolic Dysfunction with no mention of CHF; who presents with subjective fever and chills, cough, shortness of breath, nausea, vomiting with abdominal discomfort. This has been ongoing for the past week to 2 as per patient. She was found to be hypoxemic at the ED since she was not on her usual home oxygen upon presentation. She was also found to have hyponatremia. She was given 1 L of normal saline and was admitted onto the hospitalist service for further work-up and management. \"      Hospital Course:   Patient was admitted for hyponatremia and COPD exacerbation . She was given steroids, nebs and rocephin/azith for her lungs and improved back to baseline oxygen levels of 2 liters .   For her hyponatremia -  given IVF with  No improvement to her chronic hyponatremia ( her baseline sodium 130). She was to be placed on fluid restriction and urea packet to improve her sodium but decided to leave AMA. Psych saw patient and changed her prozac to zoloft in case this contributed to her chronic low sodium. Patient left AMA because worried about grandson and custody [place].  did not want to try and convince her to stay. Active Hospital Problems    Diagnosis Date Noted    Acute hyponatremia [E87.1] 07/19/2021    Acute on chronic respiratory failure with hypoxia (HCC) [J96.21] 07/19/2021    Bronchiectasis with (acute) exacerbation (HCC) [J47.1] 07/19/2021    Pulmonary HTN ([address] [I27.20] 11/21/2017    Chronic Hyponatremia [E87.1] 11/21/2017    CAD (coronary artery disease) [I25.10]     S/P CABG x 4 [Z95.1] 05/18/2015    HTN (hypertension) [I10] 05/16/2015       Disposition: home - AMA    Discharged Condition: guarded    Vital Signs  Temp: 96.8 °F (36 °C)  Pulse: 78  Resp: 16  BP: (!) 187/82  SpO2: 95 %  O2 Device: Nasal cannula  O2 Flow Rate (L/min): 2 L/min    Vital signs reviewed in electronic chart. Physical exam  Constitutional:  Well developed, well nourished, no acute distress. AAOx3  Respiratory:  No respiratory distress, scattered wheezing, no rales, no rhonchi  Cardiovascular:  Normal rate, normal rhythm, no murmurs, no gallops, no rubs. Activity: activity as tolerated  Diet: regular diet  Follow Up: Primary Care Physician in1-2 days to recheck lungs and sodium      Labs:  For convenience and continuity at follow-up the following most recent labs are provided:  Recent Results (from the past 24 hour(s))   EKG 12 Lead    Collection Time: 07/19/21 12:48 PM   Result Value Ref Range    Ventricular Rate 76 BPM    Atrial Rate 76 BPM    P-R Interval 182 ms    QRS Duration 144 ms    Q-T Interval 432 ms    QTc Calculation (Bazett) 486 ms    P Axis 30 degrees    R Axis -60 degrees    T Axis 97 degrees    Diagnosis       Normal sinus rhythm  Left axis deviation  Left bundle branch block  Abnormal ECG  When compared with ECG of 14-DEC-2020 14:02,  premature ventricular complexes are no longer present  Confirmed by Angelina Holder MD, Jair Dick ([id number]) on 7/19/2021 5:00:19 PM     CBC with Auto Diff    Collection Time: 07/19/21  1:15 PM   Result Value Ref Range    WBC 10.4 4.0 - 10.5 K/CU MM    RBC 4.53 4.2 - 5.4 M/CU MM    Hemoglobin 14.4 12.5 - 16.0 GM/DL    Hematocrit 41.7 37 - 47 %    MCV 92.1 78 - 100 FL    MCH 31.8 (H) 27 - 31 PG    MCHC 34.5 32.0 - 36.0 %    RDW 12.8 11.7 - 14.9 %    Platelets 344 703 - 129 K/CU MM    MPV 9.0 7.5 - 11.1 FL    Differential Type AUTOMATED DIFFERENTIAL     Segs Relative 54.4 36 - 66 %    Lymphocytes % 29.2 24 - 44 %    Monocytes % 10.0 (H) 0 - 4 %    Eosinophils % 4.8 (H) 0 - 3 %    Basophils % 1.2 (H) 0 - 1 %    Segs Absolute 5.7 K/CU MM    Lymphocytes Absolute 3.0 K/CU MM    Monocytes Absolute 1.0 K/CU MM    Eosinophils Absolute 0.5 K/CU MM    Basophils Absolute 0.1 K/CU MM    Immature Neutrophil % 0.4 0 - 0.43 %    Total Immature Neutrophil 0.04 K/CU MM   CMP    Collection Time: 07/19/21  1:15 PM   Result Value Ref Range    Sodium 122 (L) 135 - 145 MMOL/L    Potassium 3.8 3.5 - 5.1 MMOL/L    Chloride 88 (L) 99 - 110 mMol/L    CO2 28 21 - 32 MMOL/L    BUN 6 6 - 23 MG/DL    CREATININE 0.5 (L) 0.6 - 1.1 MG/DL    Glucose 108 (H) 70 - 99 MG/DL    Calcium 9.0 8.3 - 10.6 MG/DL    Albumin 4.0 3.4 - 5.0 GM/DL    Total Protein 6.3 (L) 6.4 - 8.2 GM/DL    Total Bilirubin 0.7 0.0 - 1.0 MG/DL    ALT 11 10 - 40 U/L    AST 13 (L) 15 - 37 IU/L    Alkaline Phosphatase 57 40 - 129 IU/L    GFR Non-African American >60 >60 mL/min/1.73m2    GFR African American >60 >60 mL/min/1.73m2    Anion Gap 6 4 - 16   Lipase    Collection Time: 07/19/21  1:15 PM   Result Value Ref Range    Lipase 15 13 - 60 IU/L   Lactate, Plasma    Collection Time: 07/19/21  1:15 PM   Result Value Ref Range    Lactate 1.2 0.4 - 2.0 mMOL/L   Procalcitonin    Collection Time: 07/19/21  1:15 PM   Result Value Ref Range    Procalcitonin 0.044    Protime/INR & PTT    Collection Time: 07/19/21  1:15 PM   Result Value Ref Range    Protime 12.7 11.7 - 14.5 SECONDS    INR 1.02 INDEX    aPTT 27.4 25.1 - 37.1 SECONDS   D-dimer, quantitative    Collection Time: 07/19/21  1:15 PM   Result Value Ref Range    D-Dimer, Quant <200 <230 NG/mL(DDU)   Urinalysis with microscopic    Collection Time: 07/19/21  1:19 PM   Result Value Ref Range    Color, UA YELLOW YELLOW    Clarity, UA CLEAR CLEAR    Glucose, Urine NEGATIVE NEGATIVE MG/DL    Bilirubin Urine NEGATIVE NEGATIVE MG/DL    Ketones, Urine NEGATIVE NEGATIVE MG/DL    Specific Gravity, UA 1.010 1.001 - 1.035    Blood, Urine NEGATIVE NEGATIVE    pH, Urine 7.5 5.0 - 8.0    Protein, UA NEGATIVE NEGATIVE MG/DL    Urobilinogen, Urine 0.2 0.2 - 1.0 MG/DL    Nitrite Urine, Quantitative NEGATIVE NEGATIVE    Leukocyte Esterase, Urine NEGATIVE NEGATIVE    RBC, UA 1 0 - 6 /HPF    WBC, UA NEGATIVE 0 - 5 /HPF    Epithelial Cells, UA NEGATIVE /HPF    Cast Type NO CAST FORMS SEEN NO CAST FORMS SEEN /HPF    Bacteria, UA NEGATIVE NEGATIVE /HPF    Crystal Type NEGATIVE NEGATIVE /HPF   Electrolytes urine random    Collection Time: 07/19/21  1:19 PM   Result Value Ref Range    Sodium, Ur 82 35 - 167 MMOL/L    Potassium, Ur 16.1 (L) 22 - 119 MMOL/L    Chloride 69 43 - 210 MMOL/L   COVID-19, Rapid    Collection Time: 07/19/21  1:25 PM    Specimen: Nasopharyngeal   Result Value Ref Range    Source THROAT     SARS-CoV-2, NAAT NOT DETECTED NOT DETECTED   POCT blood gases    Collection Time: 07/19/21  4:53 PM   Result Value Ref Range    pH, Bld 7.45 7.34 - 7.45    pCO2, Arterial 31.9 (L) 32 - 45 MMHG    pO2, Arterial 88.4 75 - 100 MMHG    Base Exc, Mixed 0.8 0 - 2.3    Base Excess MINUS 0 - 2.4    HCO3, Arterial 22.3 18 - 23 MMOL/L    CO2 Content 23.3 19 - 24 MMOL/L    O2 Sat 97.3 (H) 96 - 97 %    Source: Arterial    Sodium    Collection Time: 07/19/21  5:56 PM   Result Value Ref Range    Sodium 123 (L) 135 - 145 MMOL/L   Sodium    Collection Time: 07/19/21  9:30 PM   Result Value Ref Range    Sodium 130 (L) 136 - 145 MMOL/L   Basic metabolic panel    Collection Time: 07/20/21  2:00 AM   Result Value Ref Range    Sodium 125 (L) 135 - 145 MMOL/L    Potassium 3.9 3.5 - 5.1 MMOL/L    Chloride 94 (L) 99 - 110 mMol/L    CO2 26 21 - 32 MMOL/L    Anion Gap 5 4 - 16    BUN 7 6 - 23 MG/DL    CREATININE 0.4 (L) 0.6 - 1.1 MG/DL    Glucose 138 (H) 70 - 99 MG/DL    Calcium 8.4 8.3 - 10.6 MG/DL    GFR Non-African American >60 >60 mL/min/1.73m2    GFR African American >60 >60 mL/min/1.73m2   Comprehensive Metabolic Panel w/ Reflex to MG    Collection Time: 07/20/21  5:30 AM   Result Value Ref Range    Sodium 123 (L) 135 - 145 MMOL/L    Potassium 3.9 3.5 - 5.1 MMOL/L    Chloride 93 (L) 99 - 110 mMol/L    CO2 25 21 - 32 MMOL/L    BUN 9 6 - 23 MG/DL    CREATININE 0.4 (L) 0.6 - 1.1 MG/DL    Glucose 142 (H) 70 - 99 MG/DL    Calcium 8.3 8.3 - 10.6 MG/DL    Albumin 3.6 3.4 - 5.0 GM/DL    Total Protein 5.5 (L) 6.4 - 8.2 GM/DL    Total Bilirubin 0.6 0.0 - 1.0 MG/DL    ALT 8 (L) 10 - 40 U/L    AST 14 (L) 15 - 37 IU/L    Alkaline Phosphatase 48 40 - 129 IU/L    GFR Non-African American >60 >60 mL/min/1.73m2    GFR African American >60 >60 mL/min/1.73m2    Anion Gap 5 4 - 16   Brain Natriuretic Peptide    Collection Time: 07/20/21  5:30 AM   Result Value Ref Range    Pro-BNP 2,097 (H) <300 PG/ML   CBC auto differential    Collection Time: 07/20/21  5:30 AM   Result Value Ref Range    WBC 7.9 4.0 - 10.5 K/CU MM    RBC 4.02 (L) 4.2 - 5.4 M/CU MM    Hemoglobin 12.7 12.5 - 16.0 GM/DL    Hematocrit 38.1 37 - 47 %    MCV 94.8 78 - 100 FL    MCH 31.6 (H) 27 - 31 PG    MCHC 33.3 32.0 - 36.0 %    RDW 12.8 11.7 - 14.9 %    Platelets 068 225 - 012 K/CU MM    MPV 9.6 7.5 - 11.1 FL    Differential Type AUTOMATED DIFFERENTIAL     Segs Relative 68.5 (H) 36 - 66 %    Lymphocytes % 27.5 24 - 44 %    Monocytes % 3.0 0 - 4 % Eosinophils % 0.1 0 - 3 %    Basophils % 0.3 0 - 1 %    Segs Absolute 5.4 K/CU MM    Lymphocytes Absolute 2.2 K/CU MM    Monocytes Absolute 0.2 K/CU MM    Eosinophils Absolute 0.0 K/CU MM    Basophils Absolute 0.0 K/CU MM    Immature Neutrophil % 0.6 (H) 0 - 0.43 %    Total Immature Neutrophil 0.05 K/CU MM   Lactic acid, plasma    Collection Time: 07/20/21  6:00 AM   Result Value Ref Range    Lactate 1.2 0.4 - 2.0 mMOL/L   Sodium    Collection Time: 07/20/21  9:00 AM   Result Value Ref Range    Sodium 124 (L) 135 - 145 MMOL/L        CBC:   Lab Results   Component Value Date    WBC 7.9 07/20/2021    HGB 12.7 07/20/2021    HCT 38.1 07/20/2021     07/20/2021       RENAL:   Lab Results   Component Value Date     07/20/2021    K 3.9 07/20/2021    CL 93 07/20/2021    CO2 25 07/20/2021    BUN 9 07/20/2021    CREATININE 0.4 07/20/2021   CT CHEST:  Impression:     1. Pleural based right lower lobe nodule measures 1.2 x 0.8 cm (previously   1.1 cm).  The stability for almost 3 years is suggestive of benignity. Joa-Nn Knuckles   is minimal associated ground-glass opacity, which could represent infectious   or inflammatory process. 2. Emphysema. 3. Progression of compression deformity in multiple thoracic vertebral bodies   as above. Gertrude Lango are technically age indeterminate, but many are new from   prior CT.             Discharge Medications:     Current Discharge Medication List           Details   sertraline (ZOLOFT) 50 MG tablet Take 1 tablet by mouth daily  Qty: 30 tablet, Refills: 0      azithromycin (ZITHROMAX) 500 MG tablet Take 1 tablet by mouth daily for 3 days  Qty: 3 tablet, Refills: 0    Associated Diagnoses: Bronchiectasis with (acute) exacerbation (HCC)      cefdinir (OMNICEF) 300 MG capsule Take 1 capsule by mouth 2 times daily for 7 days  Qty: 14 capsule, Refills: 0              Details   predniSONE (DELTASONE) 20 MG tablet Take 2 tablets by mouth daily for 4 days, THEN 1.5 tablets daily for 3 days, THEN 1 tablet daily for 2 days, THEN 0.5 tablets daily for 1 day.   Qty: 15 tablet, Refills: 0              Details   busPIRone (BUSPAR) 5 MG tablet Take 1 tablet by mouth 2 times daily  Qty: 60 tablet, Refills: 5      montelukast (SINGULAIR) 10 MG tablet Take 1 tablet by mouth daily  Qty: 30 tablet, Refills: 5      doxepin (SINEQUAN) 10 MG capsule Take 1 capsule by mouth nightly  Qty: 30 capsule, Refills: 5      lisinopril (PRINIVIL;ZESTRIL) 20 MG tablet Take 0.5 tablets by mouth daily  Qty: 45 tablet, Refills: 3      carvedilol (COREG) 12.5 MG tablet TAKE 1 TABLET BY MOUTH TWICE DAILY WITH MEALS  Qty: 60 tablet, Refills: 3      atorvastatin (LIPITOR) 40 MG tablet Take 1 tablet by mouth daily  Qty: 90 tablet, Refills: 3      isosorbide mononitrate (IMDUR) 60 MG extended release tablet Take 0.5 tablets by mouth daily  Qty: 30 tablet, Refills: 2      Arformoterol Tartrate (BROVANA) 15 MCG/2ML NEBU Take 1 ampule by nebulization 2 times daily  Qty: 120 mL, Refills: 5    Associated Diagnoses: Chronic obstructive pulmonary disease, unspecified COPD type (HCC)      budesonide (PULMICORT) 0.5 MG/2ML nebulizer suspension Take 2 mLs by nebulization 2 times daily  Qty: 30 ampule, Refills: 5    Associated Diagnoses: Chronic obstructive pulmonary disease, unspecified COPD type (HCC)      albuterol sulfate  (90 Base) MCG/ACT inhaler Inhale 2 puffs into the lungs every 6 hours as needed for Wheezing      Melatonin 10 MG CAPS Take 10 mg by mouth nightly as needed       docusate sodium (COLACE) 100 MG capsule Take 100 mg by mouth daily as needed       aspirin 81 MG chewable tablet Take 1 tablet by mouth daily  Qty: 30 tablet, Refills: 3      nitroGLYCERIN (NITROSTAT) 0.4 MG SL tablet Place 1 tablet under the tongue every 5 minutes as needed for Chest pain  Qty: 25 tablet, Refills: 3      ipratropium-albuterol (DUONEB) 0.5-2.5 (3) MG/3ML SOLN nebulizer solution Take 3 mLs by nebulization 4 times daily  Qty: 360 mL, Refills: 0 Associated Diagnoses: COPD (chronic obstructive pulmonary disease) ([facility] [address]              Total discharge time 35 minutes    Signed:  Electronically signed by Isidro Frost DO on 7/20/2021 at 11:41 AM       Thank you Rekha Cade PA-C for the opportunity to be involved in this patient's care.

## 2021-07-20 NOTE — PROGRESS NOTES
Physical Therapy    Facility/Department: [facility] UNIT  Initial Assessment    NAME: José Miguel Lunsford  : 1950  MRN: [id number]    Date of Service: 2021    Discharge Recommendations:  Home with assist PRN   PT Equipment Recommendations  Equipment Needed: No    Assessment   Assessment: Patient is a pleasant 70year old female who presents at her functional baseline with mobility including bed mobility, transfers, and ambulation with assistive device. Patient presents anxious and requesting to go home. Patient with no acute PT needs at this time. Prognosis: Fair  Decision Making: Low Complexity  History: see below  Exam: see below  Clinical Presentation: Stable  PT Education: Transfer Training;Equipment;PT Role;Energy Conservation; Functional Mobility Training;General Safety  Barriers to Learning: Impulsive  REQUIRES PT FOLLOW UP: No  Activity Tolerance  Activity Tolerance: Patient Tolerated treatment well       Patient Diagnosis(es): The primary encounter diagnosis was Chronic obstructive pulmonary disease, unspecified COPD type ([address] Diagnoses of Hyponatremia, Non-intractable vomiting with nausea, unspecified vomiting type, and Bronchiectasis with (acute) exacerbation ([address] were also pertinent to this visit. has a past medical history of Arthritis, Asthma, CAD (coronary artery disease), COPD (chronic obstructive pulmonary disease) ([address] H/O Doppler ultrasound (Bilateral Carotid), H/O echocardiogram, History of nuclear stress test, Hyperlipidemia, Hypertension, Mitral regurgitation, and S/P CABG x 4.   has a past surgical history that includes knee surgery; sinus surgery; Appendectomy; Hysterectomy; Tonsillectomy; Cardiac surgery; and Intracapsular cataract extraction (Left, 2019).     Restrictions  Restrictions/Precautions  Restrictions/Precautions: General Precautions  Required Braces or Orthoses?: No  Position Activity Restriction  Other position/activity restrictions: 2L 02, IV, tele  Vision/Hearing  Vision: Impaired  Vision Exceptions: Wears glasses for reading (cataract sx)  Hearing: Exceptions to [facility]  Hearing Exceptions: Hard of hearing/hearing concerns     Subjective  General  Chart Reviewed: Yes  Patient assessed for rehabilitation services?: Yes  Family / Caregiver Present: No  Follows Commands: Within Functional Limits  Subjective  Subjective: Pt states \"I just want to go home\"  Pain Screening  Patient Currently in Pain: No  Vital Signs  Patient Currently in Pain: No       Orientation  Orientation  Overall Orientation Status: Within Functional Limits  Social/Functional History  Social/Functional History  Lives With: Spouse  Type of Home: House  Home Layout: One level  Home Access: Stairs to enter without rails  Entrance Stairs - Number of Steps: 1  Bathroom Shower/Tub: Tub/Shower unit, Shower chair with back  Bathroom Toilet: Standard (raiser)  Bathroom Equipment:  (stick on grab bars)  Home Equipment: Rolling walker (3 points on cane)  Receives Help From: Family  ADL Assistance: Needs assistance ( helps out of tub)  Homemaking Responsibilities: No ( does it)  Ambulation Assistance: Needs assistance (3 prong cane, RW)  Active : No  Occupation: Retired  Type of occupation: nurse's aide  Leisure & Hobbies: read  Cognition - slightly impulsive, decreased awareness of need for safety       Objective     Observation/Palpation  Posture: Fair  Observation: Pt was supine in bed HOB elevated, on oxygen and tele/continuous pulse ox    AROM RLE (degrees)  RLE AROM: WFL  AROM LLE (degrees)  LLE AROM : WFL  Strength RLE  Comment: Assessed functionally to be at least 4+/5 MMT  Strength LLE  Comment: Assessed functionally to be at least 4+/5 MMT  Tone RLE  RLE Tone: Normotonic  Tone LLE  LLE Tone: Normotonic  Motor Control  Gross Motor?: WFL     Bed mobility  Supine to Sit: Modified independent  Sit to Supine: Modified independent  Scooting: Modified independent  Comment: HOB slightly elevated  Transfers  Sit to Stand: Supervision  Stand to sit: Supervision  Comment: from EOB to no AD, pt mildly impulsive and therapist offers walker once in standing  Ambulation  Ambulation?: Yes  Ambulation 1  Surface: level tile  Device: Rolling Walker  Assistance: Supervision  Gait Deviations: Slow Sarah;Decreased step length;Decreased step height;Shuffles  Distance: 20 ft within room  Comments: Pt feeling ill but states she is discharging home today, poor safety awareness and utilizes furniture to ambulate 2-3 steps from walker to bed despite cues. Stairs/Curb  Stairs?: No     Balance  Posture: Fair  Sitting - Static: Good  Sitting - Dynamic: Good  Standing - Static: Good  Standing - Dynamic: Fair;+        Plan   Safety Devices  Type of devices: All fall risk precautions in place, Bed alarm in place, Left in bed, Call light within reach, Nurse notified, Gait belt    AM-PAC Score      Basic Mobility Six Clicks Form [facility] AM-PAC Score Conversion Table   How much difficulty does the patient currently have Unable   (pt is unable to do activity) A Lot   (activity is a struggle, requires great effort/time) A Little   (pt can manage, but takes more effort/time than should) None   (pt has no difficulty) Raw Score Standardized Score CMS -100% Score CMS Modifier        6 23.55 100% CN   Turning over in bed (including adjusting bedclothes, sheets, and blankets)? []1 []2  []3  [x]4  7 26.42 92.36% CM        8 28.58 86.62% CM   Sitting down on and standing up from a chair with arms (e.g. wheelchair, bedside commode, etc.)? []1 []2 []3   [x]4   9 30.55 81.38% CM        10 32.29 76.75% CL   Moving from lying on back to sitting on the side of the bed?  []1 []2  []3   [x]4   11 33.86 72.57% CL        12 35.33 68.66% CL   How much help from another person does the patient currently need Total   (Total/Dependent Assist) A Lot   (Max/Mod Assist) A Little   (Min/CGA/Supervision) None   (No human assistance) 13 36.74 64.91% CL        14 38.1 61.29% CL   Moving to and from a bed to a chair (including a wheelchair)? []1  []2   []3  [x]4   15 39.45 57.70% CK        16 40.78 54.16% CK   To walk in a hospital room? []1 []2   [x]3    []4  17 42.13 50.57% CK        18 43.63 46.58% CK   Climbing 3-5 steps with a railing?  []1  []2   [x]3    []4  19 45.44 41.77% CK        20 47.67 35.83% CJ   Raw Score  22 21 50.25 28.97% CJ   Standardized Score  53.28 22 53.28 20.91% CJ   CMS 0-100% Score  20.91% 23 56.93 11.20% CI   CMS Modifier CJ 24 61.14 0.00% CH     CH = 0% impaired  CI = 1-20% impaired  CJ = 20-40% impaired  CK = 40-60% impaired  CL = 60-80% impaired  CM = % impaired  CN = 100% impaired      Therapy Time   Individual Concurrent Group Co-treatment   Time In       0920   Time Out       0940   Minutes       20           Kyree Schofield, PT DPT

## 2021-07-20 NOTE — CONSULTS
Psychiatric Consult  Eriberto Barnes  [id number]  7/19/2021 07/20/21      ID: Patient is a 70 y.o. female    CC: I am depressed but better     HPI:  Pt is a 69 yo  female who presents for exacerbation of depression. Pt noted recent exacarbation of mood but feels safe on her current medications. PT agreed to D/C prozac and start low dose sertraline. Pt agreed to drink more sports drinks to keep sodium under control. Pt noted she currently feels safe and comfortable on the unit. Pt was in agreement with treatment team.  Pt was polite and cordial during the interview process. Pt noted she is doing [place] today. \"  Pt noted she is sleeping \"okay. ..about 6 hours last night. \"  Pt noted her apptetite is okay. Pt rated her depresssion a \"2,\" on a scale of zero to ten with ten being the worst and zero being none. Pt rated her anxiety a \"2,\" on the same scale. Pt denied any thoughts to harm herself or anyone else. Pt denied any auditory or visiual hallucintations. Pt denied any hx of seizures, TBIs, Hep C or HIV  No TD noted, AIMS=0,     Pt noted hx of previous inpt psychiatric admissions  Pt denied any previous suicide attempts  Pt denied any family hx of suicides  Pt denied any family mental health hx    Pt denied any hx of abuse trauma or neglect, physical sexual or emotional.      Alcohol: denies any current  Street drugs: denies any current  Tobacco: denies any current  Caffeine: 2-3 per day      Past Psychiatric History:   See note above       Family Psychiatric History:   Family History   Problem Relation Age of Onset    Other Mother         copd, emphysema    Colon Cancer Sister     Other Brother         copd, agent orange    Other Brother         agent orange        Allergies:   Allergies   Allergen Reactions    Seasonal Itching        OBJECTIVE  Vital Signs:  Vitals:    07/20/21 0718   BP: (!) 187/82   Pulse: 78   Resp: 16   Temp: 96.8 °F (36 °C)   SpO2: 98%       Labs:  Recent Results (from the past 48 hour(s))   EKG 12 Lead    Collection Time: 07/19/21 12:48 PM   Result Value Ref Range    Ventricular Rate 76 BPM    Atrial Rate 76 BPM    P-R Interval 182 ms    QRS Duration 144 ms    Q-T Interval 432 ms    QTc Calculation (Bazett) 486 ms    P Axis 30 degrees    R Axis -60 degrees    T Axis 97 degrees    Diagnosis       Normal sinus rhythm  Left axis deviation  Left bundle branch block  Abnormal ECG  When compared with ECG of 14-DEC-2020 14:02,  premature ventricular complexes are no longer present  Confirmed by [facility] Doris HARGROVE ([id number]) on 7/19/2021 5:00:19 PM     CBC with Auto Diff    Collection Time: 07/19/21  1:15 PM   Result Value Ref Range    WBC 10.4 4.0 - 10.5 K/CU MM    RBC 4.53 4.2 - 5.4 M/CU MM    Hemoglobin 14.4 12.5 - 16.0 GM/DL    Hematocrit 41.7 37 - 47 %    MCV 92.1 78 - 100 FL    MCH 31.8 (H) 27 - 31 PG    MCHC 34.5 32.0 - 36.0 %    RDW 12.8 11.7 - 14.9 %    Platelets 958 631 - 754 K/CU MM    MPV 9.0 7.5 - 11.1 FL    Differential Type AUTOMATED DIFFERENTIAL     Segs Relative 54.4 36 - 66 %    Lymphocytes % 29.2 24 - 44 %    Monocytes % 10.0 (H) 0 - 4 %    Eosinophils % 4.8 (H) 0 - 3 %    Basophils % 1.2 (H) 0 - 1 %    Segs Absolute 5.7 K/CU MM    Lymphocytes Absolute 3.0 K/CU MM    Monocytes Absolute 1.0 K/CU MM    Eosinophils Absolute 0.5 K/CU MM    Basophils Absolute 0.1 K/CU MM    Immature Neutrophil % 0.4 0 - 0.43 %    Total Immature Neutrophil 0.04 K/CU MM   CMP    Collection Time: 07/19/21  1:15 PM   Result Value Ref Range    Sodium 122 (L) 135 - 145 MMOL/L    Potassium 3.8 3.5 - 5.1 MMOL/L    Chloride 88 (L) 99 - 110 mMol/L    CO2 28 21 - 32 MMOL/L    BUN 6 6 - 23 MG/DL    CREATININE 0.5 (L) 0.6 - 1.1 MG/DL    Glucose 108 (H) 70 - 99 MG/DL    Calcium 9.0 8.3 - 10.6 MG/DL    Albumin 4.0 3.4 - 5.0 GM/DL    Total Protein 6.3 (L) 6.4 - 8.2 GM/DL    Total Bilirubin 0.7 0.0 - 1.0 MG/DL    ALT 11 10 - 40 U/L    AST 13 (L) 15 - 37 IU/L    Alkaline Phosphatase 57 40 - 129 IU/L    GFR Non-African American >60 >60 mL/min/1.73m2    GFR African American >60 >60 mL/min/1.73m2    Anion Gap 6 4 - 16   Lipase    Collection Time: 07/19/21  1:15 PM   Result Value Ref Range    Lipase 15 13 - 60 IU/L   Lactate, Plasma    Collection Time: 07/19/21  1:15 PM   Result Value Ref Range    Lactate 1.2 0.4 - 2.0 mMOL/L   Procalcitonin    Collection Time: 07/19/21  1:15 PM   Result Value Ref Range    Procalcitonin 0.044    Protime/INR & PTT    Collection Time: 07/19/21  1:15 PM   Result Value Ref Range    Protime 12.7 11.7 - 14.5 SECONDS    INR 1.02 INDEX    aPTT 27.4 25.1 - 37.1 SECONDS   D-dimer, quantitative    Collection Time: 07/19/21  1:15 PM   Result Value Ref Range    D-Dimer, Quant <200 <230 NG/mL(DDU)   Urinalysis with microscopic    Collection Time: 07/19/21  1:19 PM   Result Value Ref Range    Color, UA YELLOW YELLOW    Clarity, UA CLEAR CLEAR    Glucose, Urine NEGATIVE NEGATIVE MG/DL    Bilirubin Urine NEGATIVE NEGATIVE MG/DL    Ketones, Urine NEGATIVE NEGATIVE MG/DL    Specific Gravity, UA 1.010 1.001 - 1.035    Blood, Urine NEGATIVE NEGATIVE    pH, Urine 7.5 5.0 - 8.0    Protein, UA NEGATIVE NEGATIVE MG/DL    Urobilinogen, Urine 0.2 0.2 - 1.0 MG/DL    Nitrite Urine, Quantitative NEGATIVE NEGATIVE    Leukocyte Esterase, Urine NEGATIVE NEGATIVE    RBC, UA 1 0 - 6 /HPF    WBC, UA NEGATIVE 0 - 5 /HPF    Epithelial Cells, UA NEGATIVE /HPF    Cast Type NO CAST FORMS SEEN NO CAST FORMS SEEN /HPF    Bacteria, UA NEGATIVE NEGATIVE /HPF    Crystal Type NEGATIVE NEGATIVE /HPF   Electrolytes urine random    Collection Time: 07/19/21  1:19 PM   Result Value Ref Range    Sodium, Ur 82 35 - 167 MMOL/L    Potassium, Ur 16.1 (L) 22 - 119 MMOL/L    Chloride 69 43 - 210 MMOL/L   COVID-19, Rapid    Collection Time: 07/19/21  1:25 PM    Specimen: Nasopharyngeal   Result Value Ref Range    Source THROAT     SARS-CoV-2, NAAT NOT DETECTED NOT DETECTED   POCT blood gases    Collection Time: 07/19/21  4:53 PM Hemoglobin 12.7 12.5 - 16.0 GM/DL    Hematocrit 38.1 37 - 47 %    MCV 94.8 78 - 100 FL    MCH 31.6 (H) 27 - 31 PG    MCHC 33.3 32.0 - 36.0 %    RDW 12.8 11.7 - 14.9 %    Platelets 952 819 - 685 K/CU MM    MPV 9.6 7.5 - 11.1 FL    Differential Type AUTOMATED DIFFERENTIAL     Segs Relative 68.5 (H) 36 - 66 %    Lymphocytes % 27.5 24 - 44 %    Monocytes % 3.0 0 - 4 %    Eosinophils % 0.1 0 - 3 %    Basophils % 0.3 0 - 1 %    Segs Absolute 5.4 K/CU MM    Lymphocytes Absolute 2.2 K/CU MM    Monocytes Absolute 0.2 K/CU MM    Eosinophils Absolute 0.0 K/CU MM    Basophils Absolute 0.0 K/CU MM    Immature Neutrophil % 0.6 (H) 0 - 0.43 %    Total Immature Neutrophil 0.05 K/CU MM   Lactic acid, plasma    Collection Time: 07/20/21  6:00 AM   Result Value Ref Range    Lactate 1.2 0.4 - 2.0 mMOL/L       Review of Systems:  Reports of no current cardiovascular, respiratory, gastrointestinal, genitourinary, integumentary, neurological, muscuoskeletal, or immunological symptoms today. PSYCHIATRIC: See HPI above. Review of Systems:  Reports of no current cardiovascular, respiratory, gastrointestinal, genitourinary, integumentary, neurological, muscuoskeletal, or immunological symptoms today. PSYCHIATRIC: See HPI above. Neurologic examination:  Mental status: The patient is alert, attentive, and oriented. Speech is clear and fluent with good repetition, comprehension, and naming. She recalls 3/3 objects at 5 minutes.          PSYCHIATRIC EXAMINATION / MENTAL STATUS EXAM         General appearance: [x] appears age, []  appears older than stated age,               [x]  adequately dressed and groomed, [] disheveled,               [x]  in no acute distress, [] appears mildly distressed, [] other           MUSCULOSKELETAL:   Gait:   [] normal, [] antalgic, [] unsteady, [x] gait not evaluated   Station:             [] erect, [] sitting, [x] recumbent, [] other        Strength/tone:  [x] muscle strength and tone appear consistent with age and                                        condition     [] atrophy      [] abnormal movements  PSYCHIATRIC:    Appearance: appears stated age. alert and oriented to person, place, time & situation. no acute distress. Adequate grooming and hygeine. Good eye contact. No prominent physical abnormalities. Attitude: Manner is cooperative and pleasant  Motor: No psychomotor agitation, retardation or abnormal movements noted  Speech: Clearly articulated; normal rate, volume, tone & amount. Language: intact understanding and production  Mood: okay  Affect: euthymic, full range, non-labile, congruent with mood and content of speech  Thought Production: Spontaneous. Thought Form: Coherent, linear, logical & goal-directed. No tangentiality or circumstantiality. No flight of ideas or loosening of associations. Thought Content/Perceptions: No YANNICK, no AVH, no delusion  Insight: fair  Judgment fair  Memory: Immediate, recent, and remote appear intact, though not formally tested. Attention: maintained throughout interview  Fund of knowledge: Average  Gait/Balance: WNL/WNL           Impression:   MDD severe recurrent    Problem List:   <principal problem not specified>    Plan:  1. Reviewed treatment plan with patient including medication risks, benefits, side effects. Obtained informed consent for treatment. 2. Psychiatric management:medication initiation and titration, recommend outpt mental health follow up, safe and theraputic environment. 3. Status of problem/condition: ?Improving  4. Medical co-morbidities: [facility] appreciate assistance  5. Legal Status: voluntary  6. The treatment team reviewed with the patient the diagnosis and treatment recommendations to include the risks, benefits, and side effects of chosen medications. 7. The patient verbalized understanding and agreed with the treatment regimen as outlined above. 8. Medical records, Labs, Diagnotic tests reviewed  9. Interval History. 10. Review current labs  11. Continue current medications- start sertraline 50 mg Po QAM for mood. Start sports drinks for hyponatremia  12. Supportive Therapy Provided  13. Pt had an opportunity to ask questions and address concerns  14. Pt encouraged to continue outpt  Therapy. 15. Pt was in agreement with treatment plan. 16. The risks benefits and side effects of medications were discussed with the patient, including alternatives and no treatment.

## 2021-07-21 LAB
CULTURE: ABNORMAL
CULTURE: ABNORMAL
Lab: ABNORMAL
OSMOLALITY URINE: 243 MOSM/KG (ref 50–800)
OSMOLALITY: 257 MOSM/KG (ref 280–303)
SPECIMEN: ABNORMAL

## 2021-07-24 LAB
CULTURE: NORMAL
CULTURE: NORMAL
Lab: NORMAL
Lab: NORMAL
SPECIMEN: NORMAL
SPECIMEN: NORMAL

## 2021-08-05 ENCOUNTER — OFFICE VISIT (OUTPATIENT)
Dept: FAMILY MEDICINE CLINIC | Age: 71
End: 2021-08-05
Payer: MEDICARE

## 2021-08-05 DIAGNOSIS — J44.1 CHRONIC OBSTRUCTIVE PULMONARY DISEASE WITH ACUTE EXACERBATION (HCC): ICD-10-CM

## 2021-08-05 DIAGNOSIS — S22.000D COMPRESSION FRACTURE OF THORACIC VERTEBRA WITH ROUTINE HEALING, UNSPECIFIED THORACIC VERTEBRAL LEVEL, SUBSEQUENT ENCOUNTER: ICD-10-CM

## 2021-08-05 DIAGNOSIS — E87.1 ACUTE HYPONATREMIA: ICD-10-CM

## 2021-08-05 DIAGNOSIS — R06.02 SOB (SHORTNESS OF BREATH): Primary | ICD-10-CM

## 2021-08-05 DIAGNOSIS — F41.9 ANXIETY: ICD-10-CM

## 2021-08-05 PROCEDURE — 3017F COLORECTAL CA SCREEN DOC REV: CPT | Performed by: PHYSICIAN ASSISTANT

## 2021-08-05 PROCEDURE — G8926 SPIRO NO PERF OR DOC: HCPCS | Performed by: PHYSICIAN ASSISTANT

## 2021-08-05 PROCEDURE — G8427 DOCREV CUR MEDS BY ELIG CLIN: HCPCS | Performed by: PHYSICIAN ASSISTANT

## 2021-08-05 PROCEDURE — G8419 CALC BMI OUT NRM PARAM NOF/U: HCPCS | Performed by: PHYSICIAN ASSISTANT

## 2021-08-05 PROCEDURE — G8400 PT W/DXA NO RESULTS DOC: HCPCS | Performed by: PHYSICIAN ASSISTANT

## 2021-08-05 PROCEDURE — 1036F TOBACCO NON-USER: CPT | Performed by: PHYSICIAN ASSISTANT

## 2021-08-05 PROCEDURE — 1090F PRES/ABSN URINE INCON ASSESS: CPT | Performed by: PHYSICIAN ASSISTANT

## 2021-08-05 PROCEDURE — 3023F SPIROM DOC REV: CPT | Performed by: PHYSICIAN ASSISTANT

## 2021-08-05 PROCEDURE — 1123F ACP DISCUSS/DSCN MKR DOCD: CPT | Performed by: PHYSICIAN ASSISTANT

## 2021-08-05 PROCEDURE — 99214 OFFICE O/P EST MOD 30 MIN: CPT | Performed by: PHYSICIAN ASSISTANT

## 2021-08-05 PROCEDURE — 1111F DSCHRG MED/CURRENT MED MERGE: CPT | Performed by: PHYSICIAN ASSISTANT

## 2021-08-05 PROCEDURE — 4040F PNEUMOC VAC/ADMIN/RCVD: CPT | Performed by: PHYSICIAN ASSISTANT

## 2021-08-05 RX ORDER — PREDNISONE 10 MG/1
10 TABLET ORAL DAILY
COMMUNITY
End: 2021-08-16

## 2021-08-05 RX ORDER — ESCITALOPRAM OXALATE 10 MG/1
10 TABLET ORAL DAILY
Qty: 30 TABLET | Refills: 3 | Status: SHIPPED | OUTPATIENT
Start: 2021-08-05 | End: 2021-11-17 | Stop reason: SDUPTHER

## 2021-08-05 ASSESSMENT — PATIENT HEALTH QUESTIONNAIRE - PHQ9
1. LITTLE INTEREST OR PLEASURE IN DOING THINGS: 0
SUM OF ALL RESPONSES TO PHQ QUESTIONS 1-9: 0
2. FEELING DOWN, DEPRESSED OR HOPELESS: 0
SUM OF ALL RESPONSES TO PHQ9 QUESTIONS 1 & 2: 0
SUM OF ALL RESPONSES TO PHQ QUESTIONS 1-9: 0
SUM OF ALL RESPONSES TO PHQ QUESTIONS 1-9: 0

## 2021-08-05 NOTE — PROGRESS NOTES
Post-Discharge Transitional Care Management Services or Hospital Follow Up      [id number] [address] [facility]   YOB: 1950    Date of Office Visit:  8/5/2021  Date of Hospital Admission: 7/19/21  Date of Hospital Discharge: 7/20/21  Readmission Risk Score(high >=14%.  Medium >=10%):Readmission Risk Score: 14      Care management risk score Rising risk (score 2-5) and Complex Care (Scores >=6): 2     Non face to face  following discharge, date last encounter closed (first attempt may have been earlier): *No documented post hospital discharge outreach found in the last 14 days *No documented post hospital discharge outreach found in the last 14 days    Call initiated 2 business days of discharge: *No response recorded in the last 14 days     Patient Active Problem List   Diagnosis    HTN (hypertension)    Tobacco abuse    Asthma    Mitral regurgitation    Chronic obstructive pulmonary disease ([address]    S/P CABG x 4    CAD (coronary artery disease)    Ischemic chest pain ([address]    Chest pain    Chronic Hyponatremia    Diastolic dysfunction without heart failure    Pulmonary HTN ([address]    Anxiety    Primary insomnia    History of ST elevation myocardial infarction (STEMI)    Chronic respiratory failure with hypoxia ([address]    Pulmonary nodule    Personal history of nicotine dependence     Need for prophylactic vaccination against Streptococcus pneumoniae (pneumococcus)    Need for shingles vaccine    Acute hyponatremia    Acute on chronic respiratory failure with hypoxia ([address]    Bronchiectasis with (acute) exacerbation (HCC)       Allergies   Allergen Reactions    Seasonal Itching       Medications listed as ordered at the time of discharge from hospital   Gianna Licona   Home Medication Instructions DIONNE:    Printed on:08/05/21 3071   Medication Information                      albuterol sulfate  (90 Base) MCG/ACT inhaler  Inhale 2 puffs into the lungs every 6 hours as needed for Wheezing             Arformoterol Tartrate (BROVANA) 15 MCG/2ML NEBU  Take 1 ampule by nebulization 2 times daily             aspirin 81 MG chewable tablet  Take 1 tablet by mouth daily             atorvastatin (LIPITOR) 40 MG tablet  Take 1 tablet by mouth daily             budesonide (PULMICORT) 0.5 MG/2ML nebulizer suspension  Take 2 mLs by nebulization 2 times daily             busPIRone (BUSPAR) 5 MG tablet  Take 1 tablet by mouth 2 times daily             carvedilol (COREG) 12.5 MG tablet  TAKE 1 TABLET BY MOUTH TWICE DAILY WITH MEALS             docusate sodium (COLACE) 100 MG capsule  Take 100 mg by mouth daily as needed              doxepin (SINEQUAN) 10 MG capsule  Take 1 capsule by mouth nightly             escitalopram (LEXAPRO) 10 MG tablet  Take 1 tablet by mouth daily             ipratropium-albuterol (DUONEB) 0.5-2.5 (3) MG/3ML SOLN nebulizer solution  Take 3 mLs by nebulization 4 times daily             isosorbide mononitrate (IMDUR) 60 MG extended release tablet  Take 0.5 tablets by mouth daily             lisinopril (PRINIVIL;ZESTRIL) 20 MG tablet  Take 0.5 tablets by mouth daily             Melatonin 10 MG CAPS  Take 10 mg by mouth nightly as needed              montelukast (SINGULAIR) 10 MG tablet  Take 1 tablet by mouth daily             nitroGLYCERIN (NITROSTAT) 0.4 MG SL tablet  Place 1 tablet under the tongue every 5 minutes as needed for Chest pain             predniSONE (DELTASONE) 10 MG tablet  Take 10 mg by mouth daily                   Medications marked \"taking\" at this time  Outpatient Medications Marked as Taking for the 8/5/21 encounter (Office Visit) with Sisi Chavarria PA-C   Medication Sig Dispense Refill    predniSONE (DELTASONE) 10 MG tablet Take 10 mg by mouth daily      escitalopram (LEXAPRO) 10 MG tablet Take 1 tablet by mouth daily 30 tablet 3    busPIRone (BUSPAR) 5 MG tablet Take 1 tablet by mouth 2 times daily 60 tablet 5    montelukast (SINGULAIR) 10 MG tablet Take 1 tablet by mouth daily 30 tablet 5    doxepin (SINEQUAN) 10 MG capsule Take 1 capsule by mouth nightly 30 capsule 5    lisinopril (PRINIVIL;ZESTRIL) 20 MG tablet Take 0.5 tablets by mouth daily 45 tablet 3    carvedilol (COREG) 12.5 MG tablet TAKE 1 TABLET BY MOUTH TWICE DAILY WITH MEALS 60 tablet 3    atorvastatin (LIPITOR) 40 MG tablet Take 1 tablet by mouth daily 90 tablet 3    isosorbide mononitrate (IMDUR) 60 MG extended release tablet Take 0.5 tablets by mouth daily 30 tablet 2    Arformoterol Tartrate (BROVANA) 15 MCG/2ML NEBU Take 1 ampule by nebulization 2 times daily 120 mL 5    budesonide (PULMICORT) 0.5 MG/2ML nebulizer suspension Take 2 mLs by nebulization 2 times daily 30 ampule 5    nitroGLYCERIN (NITROSTAT) 0.4 MG SL tablet Place 1 tablet under the tongue every 5 minutes as needed for Chest pain 25 tablet 3    albuterol sulfate  (90 Base) MCG/ACT inhaler Inhale 2 puffs into the lungs every 6 hours as needed for Wheezing      Melatonin 10 MG CAPS Take 10 mg by mouth nightly as needed       ipratropium-albuterol (DUONEB) 0.5-2.5 (3) MG/3ML SOLN nebulizer solution Take 3 mLs by nebulization 4 times daily 360 mL 0    docusate sodium (COLACE) 100 MG capsule Take 100 mg by mouth daily as needed       aspirin 81 MG chewable tablet Take 1 tablet by mouth daily 30 tablet 3        Medications patient taking as of now reconciled against medications ordered at time of hospital discharge: {YES / MB:75218}    Chief Complaint   Patient presents with    Other     Pt here for transitional care    Other     States that they switched her meds while at the hospital and is now having bad dreams. HPI    Inpatient course: Discharge summary reviewed- see chart.     Interval history/Current status: ***    Review of Systems    Vitals:    08/05/21 1027   BP: (!) 160/80   Site: Left Upper Arm   Position: Sitting   Cuff Size: Medium Adult   Pulse: 70   Resp: 18   Temp: 98.4 °F (36.9 °C)   SpO2: 98% Weight: 113 lb 6.4 oz (51.4 kg)     Body mass index is 18.3 kg/m². Wt Readings from Last 3 Encounters:   08/05/21 113 lb 6.4 oz (51.4 kg)   07/20/21 115 lb 4.8 oz (52.3 kg)   06/04/21 120 lb 6.4 oz (54.6 kg)     BP Readings from Last 3 Encounters:   08/05/21 (!) 160/80   07/20/21 (!) 187/82   06/04/21 (!) 140/82       Physical Exam        Assessment/Plan:  1. Hyponatremia  ***  - Comprehensive Metabolic Panel; Future  - CBC WITH AUTO DIFFERENTIAL; Future    2. SOB (shortness of breath)  ***  - BRAIN NATRIURETIC PEPTIDE (BNP); Future  - CBC WITH AUTO DIFFERENTIAL;  Future        Medical Decision Making: {TCMPN4:96587}

## 2021-08-06 VITALS
OXYGEN SATURATION: 98 % | WEIGHT: 113.4 LBS | RESPIRATION RATE: 18 BRPM | HEART RATE: 70 BPM | BODY MASS INDEX: 18.3 KG/M2 | TEMPERATURE: 98.4 F | SYSTOLIC BLOOD PRESSURE: 138 MMHG | DIASTOLIC BLOOD PRESSURE: 80 MMHG

## 2021-08-06 DIAGNOSIS — R05.9 COUGH: Primary | ICD-10-CM

## 2021-08-06 PROBLEM — J96.21 ACUTE ON CHRONIC RESPIRATORY FAILURE WITH HYPOXIA (HCC): Status: RESOLVED | Noted: 2021-07-19 | Resolved: 2021-08-06

## 2021-08-06 PROBLEM — S32.82XD: Status: ACTIVE | Noted: 2021-08-06

## 2021-08-06 PROBLEM — S22.000D COMPRESSION FRACTURE OF THORACIC VERTEBRA WITH ROUTINE HEALING: Status: ACTIVE | Noted: 2021-08-06

## 2021-08-06 RX ORDER — CEFDINIR 300 MG/1
300 CAPSULE ORAL 2 TIMES DAILY
Qty: 20 CAPSULE | Refills: 0 | Status: SHIPPED | OUTPATIENT
Start: 2021-08-06 | End: 2021-08-16

## 2021-08-06 ASSESSMENT — ENCOUNTER SYMPTOMS
SHORTNESS OF BREATH: 1
VOMITING: 0
BACK PAIN: 1
CHEST TIGHTNESS: 1
NAUSEA: 0
ABDOMINAL PAIN: 0
COUGH: 1

## 2021-08-06 NOTE — PROGRESS NOTES
[facility]  1950  70 y.o.  female    SUBJECTIVE:    Chief Complaint   Patient presents with    Other     Pt here for transitional care    Other     States that they switched her meds while at the hospital and is now having bad dreams. HPI  Pt here today for f/u. Recently hospitalized on 7/19/2021 for COPD exacerbation/cough/sob/fever/chills/nausea/abd discomfort x 2 weeks. Hypoxemic on arrival to ER as she did not have her home O2 on. Hyponatremic and pt admitted for further work up. She had steroids/nebulizers/rocephin/azithromycin for COPD exacerbation and placed on fluid restriction/urea packet for hyponatremia but left AMA before return to baseline confirmed. Prozac was switched to zolfot but since discharge, pt states she has been having severe nightmares and can not continue zoloft. Overall improving since leaving AMA but does have continued sob which is not unusual for her.    New compression deformities noted in thoracic spine on CT of chest    PHQ Scores 8/5/2021 6/4/2021 1/5/2021   PHQ2 Score 0 2 0   PHQ9 Score 0 2 0     Interpretation of Total Score Depression Severity: 1-4 = Minimal depression, 5-9 = Mild depression, 10-14 = Moderate depression, 15-19 = Moderately severe depression, 20-27 = Severe depression     Current Outpatient Medications on File Prior to Visit   Medication Sig Dispense Refill    predniSONE (DELTASONE) 10 MG tablet Take 10 mg by mouth daily      busPIRone (BUSPAR) 5 MG tablet Take 1 tablet by mouth 2 times daily 60 tablet 5    montelukast (SINGULAIR) 10 MG tablet Take 1 tablet by mouth daily 30 tablet 5    doxepin (SINEQUAN) 10 MG capsule Take 1 capsule by mouth nightly 30 capsule 5    lisinopril (PRINIVIL;ZESTRIL) 20 MG tablet Take 0.5 tablets by mouth daily 45 tablet 3    carvedilol (COREG) 12.5 MG tablet TAKE 1 TABLET BY MOUTH TWICE DAILY WITH MEALS 60 tablet 3    atorvastatin (LIPITOR) 40 MG tablet Take 1 tablet by mouth daily 90 tablet 3    isosorbide mononitrate (IMDUR) 60 MG extended release tablet Take 0.5 tablets by mouth daily 30 tablet 2    Arformoterol Tartrate (BROVANA) 15 MCG/2ML NEBU Take 1 ampule by nebulization 2 times daily 120 mL 5    budesonide (PULMICORT) 0.5 MG/2ML nebulizer suspension Take 2 mLs by nebulization 2 times daily 30 ampule 5    nitroGLYCERIN (NITROSTAT) 0.4 MG SL tablet Place 1 tablet under the tongue every 5 minutes as needed for Chest pain 25 tablet 3    albuterol sulfate  (90 Base) MCG/ACT inhaler Inhale 2 puffs into the lungs every 6 hours as needed for Wheezing      Melatonin 10 MG CAPS Take 10 mg by mouth nightly as needed       ipratropium-albuterol (DUONEB) 0.5-2.5 (3) MG/3ML SOLN nebulizer solution Take 3 mLs by nebulization 4 times daily 360 mL 0    docusate sodium (COLACE) 100 MG capsule Take 100 mg by mouth daily as needed       aspirin 81 MG chewable tablet Take 1 tablet by mouth daily 30 tablet 3     No current facility-administered medications on file prior to visit. Allergies   Allergen Reactions    Seasonal Itching       Past Medical History:   Diagnosis Date    Acute on chronic respiratory failure with hypoxia ([facility] [address] 7/19/2021    Arthritis     Asthma     CAD (coronary artery disease)     COPD (chronic obstructive pulmonary disease) ([facility])     H/O Doppler ultrasound (Bilateral Carotid) 04/26/2018    No hemodynamically significant stenosis noted in the internal carotid artery bilaterally.  H/O echocardiogram 03/07/2017; 12/8/2020    Left ventricular systolic function is moderately depressed with EF 35-40%. Mild-Moderate Pulmonary HTN.  History of nuclear stress test 03/07/2017; 11/3/2020    LVEF is low, EF 40%. Normal perfusion study.    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    [place] to LAD & Diag, SVG to Post lat RCA & Ramus       Past Surgical History:   Procedure Laterality Date    APPENDECTOMY      CARDIAC SURGERY      HYSTERECTOMY      INTRACAPSULAR CATARACT EXTRACTION Left 2019    EYE CATARACT EMULSIFICATION IOL IMPLANT performed by Teo Goins MD at [address] [place] [address]      TONSILLECTOMY         Social History     Socioeconomic History    Marital status:      Spouse name: None    Number of children: None    Years of education: None    Highest education level: None   Occupational History    None   Tobacco Use    Smoking status: Former Smoker     Packs/day: 1.00     Years: 40.00     Pack years: 40.00     Types: Cigarettes     Quit date: 5/15/2015     Years since quittin.2    Smokeless tobacco: Never Used   Vaping Use    Vaping Use: Never used   Substance and Sexual Activity    Alcohol use: No     Alcohol/week: 0.0 standard drinks    Drug use: No    Sexual activity: Yes     Partners: Male   Other Topics Concern    None   Social History Narrative    ** Merged History Encounter **          Social Determinants of Health     Financial Resource Strain:     Difficulty of Paying Living Expenses:    Food Insecurity:     Worried About Running Out of Food in the Last Year:     Ran Out of Food in the Last Year:    Transportation Needs:     Lack of Transportation (Medical):  Lack of Transportation (Non-Medical):    Physical Activity:     Days of Exercise per Week:     Minutes of Exercise per Session:    Stress:     Feeling of Stress :    Social Connections:     Frequency of Communication with Friends and Family:     Frequency of Social Gatherings with Friends and Family:     Attends [employer] Services:     Active Member of Clubs or Organizations:     Attends Club or Organization Meetings:     Marital Status:    Intimate Partner Violence:     Fear of Current or Ex-Partner:     Emotionally Abused:     Physically Abused:     Sexually Abused:        Review of Systems   Constitutional: Negative for chills and fever.    Respiratory: Positive for cough, chest tightness and shortness of breath. Cardiovascular: Negative for chest pain and leg swelling. Gastrointestinal: Negative for abdominal pain, nausea and vomiting. Musculoskeletal: Positive for arthralgias and back pain. Skin: Negative for pallor. Neurological: Negative for dizziness and headaches. Psychiatric/Behavioral: Negative for dysphoric mood. The patient is not nervous/anxious. OBJECTIVE:    /80 (Site: Left Upper Arm, Position: Sitting, Cuff Size: Medium Adult)   Pulse 70   Temp 98.4 °F (36.9 °C)   Resp 18   Wt 113 lb 6.4 oz (51.4 kg)   LMP  (LMP Unknown)   SpO2 98%   BMI 18.30 kg/m²     Physical Exam  Vitals reviewed. Constitutional:       General: She is not in acute distress. Appearance: Normal appearance. She is ill-appearing. HENT:      Head: Normocephalic. Right Ear: External ear normal.      Left Ear: External ear normal.   Eyes:      Extraocular Movements: Extraocular movements intact. Cardiovascular:      Rate and Rhythm: Normal rate and regular rhythm. Heart sounds: Normal heart sounds. Pulmonary:      Effort: Pulmonary effort is normal. No respiratory distress. Breath sounds: No stridor. Examination of the left-lower field reveals wheezing. Wheezing present. No rhonchi or rales. Chest:      Chest wall: No tenderness. Abdominal:      Palpations: Abdomen is soft. Tenderness: There is no abdominal tenderness. Skin:     General: Skin is warm and dry. Neurological:      Mental Status: She is alert and oriented to person, place, and time. Psychiatric:         Mood and Affect: Mood normal.         Thought Content: Thought content normal.         Judgment: Judgment normal.         Carlos Hein:    Problem List        Musculoskeletal and Integument    Compression fracture of thoracic vertebra with routine healing     Multiple in thoracic/lumbar spine. On chronic steroids, will need DEXA and start bisphosphonate or prolia.  Will discuss with pt at later date as results not noted by PCP until further review of EMR after pt left. Will call and discuss. Relevant Medications    aspirin 81 MG chewable tablet       Respiratory    Chronic obstructive pulmonary disease (HCC)     Pt has portable O2 on today, no acute distress noted , compliant with current meds. Will continue as directed, advised to f/u with pulmonology-call today for appt. ER if further sob/diff breathing/worsening symptoms          Relevant Medications    ipratropium-albuterol (DUONEB) 0.5-2.5 (3) MG/3ML SOLN nebulizer solution    albuterol sulfate  (90 Base) MCG/ACT inhaler    Arformoterol Tartrate (BROVANA) 15 MCG/2ML NEBU    budesonide (PULMICORT) 0.5 MG/2ML nebulizer suspension    montelukast (SINGULAIR) 10 MG tablet    predniSONE (DELTASONE) 10 MG tablet       Other    Anxiety     Unable to tolerate zoloft,will switch to lexapro,Risks/benefits/SE reviewed, pt voices understanding. Pt aware risk of hyponatremia with this medication as it can be with any SSRI but she feels risk is worth the benefit and agrees to have sodium monitored closely. l          Relevant Medications    busPIRone (BUSPAR) 5 MG tablet    doxepin (SINEQUAN) 10 MG capsule    escitalopram (LEXAPRO) 10 MG tablet    Acute hyponatremia     Repeat labs today          Relevant Orders    Comprehensive Metabolic Panel (Completed)               Return in about 6 weeks (around 9/16/2021).

## 2021-08-06 NOTE — ASSESSMENT & PLAN NOTE
Unable to tolerate zoloft,will switch to lexapro,Risks/benefits/SE reviewed, pt voices understanding. Pt aware risk of hyponatremia with this medication as it can be with any SSRI but she feels risk is worth the benefit and agrees to have sodium monitored closely.  l

## 2021-08-06 NOTE — ASSESSMENT & PLAN NOTE
Pt has portable O2 on today, no acute distress noted , compliant with current meds. Will continue as directed, advised to f/u with pulmonology-call today for appt.  ER if further sob/diff breathing/worsening symptoms

## 2021-08-12 ENCOUNTER — OFFICE VISIT (OUTPATIENT)
Dept: CARDIOLOGY CLINIC | Age: 71
End: 2021-08-12
Payer: MEDICARE

## 2021-08-12 VITALS
HEIGHT: 66 IN | WEIGHT: 113 LBS | SYSTOLIC BLOOD PRESSURE: 122 MMHG | HEART RATE: 64 BPM | BODY MASS INDEX: 18.16 KG/M2 | DIASTOLIC BLOOD PRESSURE: 70 MMHG

## 2021-08-12 DIAGNOSIS — I25.10 CORONARY ARTERY DISEASE INVOLVING NATIVE CORONARY ARTERY OF NATIVE HEART WITHOUT ANGINA PECTORIS: Primary | ICD-10-CM

## 2021-08-12 DIAGNOSIS — I27.20 PULMONARY HTN (HCC): Chronic | ICD-10-CM

## 2021-08-12 DIAGNOSIS — I10 ESSENTIAL HYPERTENSION: ICD-10-CM

## 2021-08-12 DIAGNOSIS — J44.1 CHRONIC OBSTRUCTIVE PULMONARY DISEASE WITH ACUTE EXACERBATION (HCC): ICD-10-CM

## 2021-08-12 PROCEDURE — G8926 SPIRO NO PERF OR DOC: HCPCS | Performed by: NURSE PRACTITIONER

## 2021-08-12 PROCEDURE — 99214 OFFICE O/P EST MOD 30 MIN: CPT | Performed by: NURSE PRACTITIONER

## 2021-08-12 PROCEDURE — 3023F SPIROM DOC REV: CPT | Performed by: NURSE PRACTITIONER

## 2021-08-12 PROCEDURE — G8427 DOCREV CUR MEDS BY ELIG CLIN: HCPCS | Performed by: NURSE PRACTITIONER

## 2021-08-12 PROCEDURE — 3017F COLORECTAL CA SCREEN DOC REV: CPT | Performed by: NURSE PRACTITIONER

## 2021-08-12 PROCEDURE — 4040F PNEUMOC VAC/ADMIN/RCVD: CPT | Performed by: NURSE PRACTITIONER

## 2021-08-12 PROCEDURE — G8419 CALC BMI OUT NRM PARAM NOF/U: HCPCS | Performed by: NURSE PRACTITIONER

## 2021-08-12 PROCEDURE — G8400 PT W/DXA NO RESULTS DOC: HCPCS | Performed by: NURSE PRACTITIONER

## 2021-08-12 PROCEDURE — 1111F DSCHRG MED/CURRENT MED MERGE: CPT | Performed by: NURSE PRACTITIONER

## 2021-08-12 PROCEDURE — 1123F ACP DISCUSS/DSCN MKR DOCD: CPT | Performed by: NURSE PRACTITIONER

## 2021-08-12 PROCEDURE — 1036F TOBACCO NON-USER: CPT | Performed by: NURSE PRACTITIONER

## 2021-08-12 PROCEDURE — 1090F PRES/ABSN URINE INCON ASSESS: CPT | Performed by: NURSE PRACTITIONER

## 2021-08-12 ASSESSMENT — ENCOUNTER SYMPTOMS
SHORTNESS OF BREATH: 1
COUGH: 0

## 2021-08-12 NOTE — PROGRESS NOTES
Mauro Ferris MD, aMricruz Beck MD, Kareen Guerra MD, MD Yolie Paniagua MD Oswald Sheriff, MD Margaree Bang, MD Israel Juneau, [facility], [facility], [facility], [facility]    CARDIOLOGY  NOTE    2021    Santos Huggins (:  1950) is a 70 y.o. female,Established patient with Dr. Isaiah Russell, here for evaluation of the following chief complaint(s):  3 Month Follow-Up (patient was seeing Dr. Jonathan Shin but will be seen in [place]'s office now. she has COPD and is on oxygen. she has trouble with SOB and weakness. )        SUBJECTIVE/OBJECTIVE:    Patient is her to follow up on the following:  Coronary artery disease involving native coronary artery of native heart without angina pectoris  (primary encounter diagnosis)  Chronic obstructive pulmonary disease with acute exacerbation ([address]  Essential hypertension  Pulmonary HTN ([facility] [address]    Patient has been feeling okay. She states that her COPD is stable right now. She is not having any chest pain. She is wearing oxygen continuously and is using a wheel chair and cane to assist with ambulation due to weakness. Patient is a former smoker. Patient denies issues obtaining or taking medications. Patient is active and does not do organized exercise. Patient denies chest pain, palpitations, dizziness, orthopnea, lower leg swelling, or syncope. Review of Systems   Constitutional: Negative for fatigue and fever. Respiratory: Positive for shortness of breath. Negative for cough. Cardiovascular: Negative for chest pain, palpitations and leg swelling. Musculoskeletal: Positive for gait problem (uses a cane at home). Negative for arthralgias. Neurological: Positive for weakness. Negative for dizziness, syncope, light-headedness and headaches.        Vitals:    21 1406   BP: 122/70   Pulse: 64   Weight: 113 lb (51.3 kg)   Height: 5' 6\" (1.676 m)       Wt Readings from Last 3 Encounters:   08/12/21 113 lb (51.3 kg)   08/05/21 113 lb 6.4 oz (51.4 kg)   07/20/21 115 lb 4.8 oz (52.3 kg)       BP Readings from Last 3 Encounters:   08/12/21 122/70   08/05/21 138/80   07/20/21 (!) 187/82       Prior to Admission medications    Medication Sig Start Date End Date Taking?  Authorizing Provider   cefdinir (OMNICEF) 300 MG capsule Take 1 capsule by mouth 2 times daily for 10 days 8/6/21 8/16/21 Yes Cara Maki PA-C   predniSONE (DELTASONE) 10 MG tablet Take 10 mg by mouth daily   Yes Historical Provider, MD   escitalopram (LEXAPRO) 10 MG tablet Take 1 tablet by mouth daily 8/5/21  Yes Cara Maki PA-C   busPIRone (BUSPAR) 5 MG tablet Take 1 tablet by mouth 2 times daily 7/6/21  Yes Cara Maki PA-C   montelukast (SINGULAIR) 10 MG tablet Take 1 tablet by mouth daily 7/6/21  Yes Cara Maki PA-C   doxepin (SINEQUAN) 10 MG capsule Take 1 capsule by mouth nightly 7/6/21  Yes Cara Maki PA-C   lisinopril (PRINIVIL;ZESTRIL) 20 MG tablet Take 0.5 tablets by mouth daily 6/8/21 6/8/22 Yes Roman Becker MD   carvedilol (COREG) 12.5 MG tablet TAKE 1 TABLET BY MOUTH TWICE DAILY WITH MEALS 6/7/21  Yes David Vanessa MD   atorvastatin (LIPITOR) 40 MG tablet Take 1 tablet by mouth daily 5/12/21 5/12/22 Yes Roman Becker MD   isosorbide mononitrate (IMDUR) 60 MG extended release tablet Take 0.5 tablets by mouth daily 3/22/21 3/22/22 Yes Cara Maki PA-C   Arformoterol Tartrate (BROVANA) 15 MCG/2ML NEBU Take 1 ampule by nebulization 2 times daily 1/25/21  Yes CHEPE Zaldivar - CNP   budesonide (PULMICORT) 0.5 MG/2ML nebulizer suspension Take 2 mLs by nebulization 2 times daily 1/25/21  Yes Melita Ibarra APRN - CNP   nitroGLYCERIN (NITROSTAT) 0.4 MG SL tablet Place 1 tablet under the tongue every 5 minutes as needed for Chest pain 1/5/21  Yes Cara Maki PA-C   albuterol sulfate  (90 Base) MCG/ACT inhaler Inhale 2 puffs into the lungs every 6 hours as needed for Wheezing   Yes Historical Provider, MD   Melatonin 10 MG CAPS Take 10 mg by mouth nightly as needed    Yes Historical Provider, MD   ipratropium-albuterol (DUONEB) 0.5-2.5 (3) MG/3ML SOLN nebulizer solution Take 3 mLs by nebulization 4 times daily 11/23/17  Yes Tanya Godinez MD   docusate sodium (COLACE) 100 MG capsule Take 100 mg by mouth daily as needed    Yes Historical Provider, MD   aspirin 81 MG chewable tablet Take 1 tablet by mouth daily 6/3/15  Yes Cornelius Corral MD       Physical Exam  Vitals reviewed. Constitutional:       General: She is not in acute distress. Appearance: Normal appearance. She is not ill-appearing. HENT:      Head: Atraumatic. Neck:      Vascular: No carotid bruit. Cardiovascular:      Rate and Rhythm: Normal rate and regular rhythm. Pulses: Normal pulses. Heart sounds: Normal heart sounds. No murmur heard. Pulmonary:      Effort: Pulmonary effort is normal. No respiratory distress. Breath sounds: Decreased breath sounds present. Comments: On 2 L NC continuous  Musculoskeletal:         General: No swelling or deformity. Cervical back: Neck supple. No muscular tenderness. Neurological:      Mental Status: She is alert.          Health Maintenance   Topic Date Due    DTaP/Tdap/Td vaccine (1 - Tdap) Never done    Breast cancer screen  Never done    Shingles Vaccine (1 of 2) Never done    DEXA (modify frequency per FRAX score)  Never done    A1C test (Diabetic or Prediabetic)  05/16/2016    Flu vaccine (1) 09/01/2021    Lipid screen  06/04/2022    Annual Wellness Visit (AWV)  06/05/2022    Low dose CT lung screening  07/19/2022    Potassium monitoring  08/05/2022    Creatinine monitoring  08/05/2022    Colon cancer screen fecal DNA test (Cologuard)  07/05/2024    Pneumococcal 65+ years Vaccine  Completed    COVID-19 Vaccine  Completed    Hepatitis C screen  Completed    Hepatitis A vaccine  Aged Out    Hepatitis B vaccine  Aged Out    Hib vaccine  Aged Out    Meningococcal (ACWY) vaccine  Aged Out       Lab Review   Lab Results   Component Value Date    CHOL 147 06/04/2021    TRIG 72 06/04/2021    HDL 37 06/04/2021    LDLDIRECT 98 04/13/2018      Echo 12/2020   Summary   Left ventricular systolic function is moderately depressed with an ejection   fraction of 35 to 40 %. Anterior wall hypokinesis & Septal wall hypokinesis noted. Mild septal wall asymmetrical left ventricular hypertrophy. The left atrium is mildly dilated. Mild thickening of anterior/posterior leaflets of mitral valve. Right ventricular systolic pressure of 46 mmHg consistent with mild to   moderate pulmonary hypertension. No evidence of pericardial effusion. ASSESSMENT/PLAN:    1. Coronary artery disease involving native coronary artery of native heart without angina pectoris   Patient had CABG x 4 2015   Patient is not having cardiac symptoms   Continue coreg, ASA, lipitor   Low salt, Low cholesterol diet    2. Chronic obstructive pulmonary disease with acute exacerbation (HCC)  · On continuous oxygen. · Stable    3. Essential hypertension   Controlled   To continue coreg, lisinopril,    advised low salt diet     4. Pulmonary HTN ([address]  · Continue to see pulmonology        Return in about 3 months (around 11/12/2021) for with Dr. Rm Jones, or vincent if needed. An electronic signature was used to authenticate this note.     Electronically signed by CHEPE Snyder CNP on 8/12/2021 at 2:27 PM

## 2021-08-15 DIAGNOSIS — R06.02 SOB (SHORTNESS OF BREATH): ICD-10-CM

## 2021-08-16 RX ORDER — PREDNISONE 10 MG/1
TABLET ORAL
Qty: 30 TABLET | Refills: 0 | Status: SHIPPED | OUTPATIENT
Start: 2021-08-16 | End: 2021-09-17

## 2021-08-16 NOTE — TELEPHONE ENCOUNTER
Patients  called would like either the fosamax or boniva sent to the pharmacy she agreed to have the dexa scan done also please advise

## 2021-08-16 NOTE — TELEPHONE ENCOUNTER
Called pt informed both pt and  that medication was sent in and and that Manfred Jones would like pt to have a Dexa scan done and start new medication pt agreed to both test and medication.

## 2021-08-17 DIAGNOSIS — Z78.0 POSTMENOPAUSAL: Primary | ICD-10-CM

## 2021-08-17 RX ORDER — IBANDRONATE SODIUM 150 MG/1
150 TABLET, FILM COATED ORAL
Qty: 1 TABLET | Refills: 11 | Status: ON HOLD | OUTPATIENT
Start: 2021-08-17 | End: 2022-08-19 | Stop reason: SDUPTHER

## 2021-08-30 RX ORDER — CARVEDILOL 12.5 MG/1
TABLET ORAL
Qty: 60 TABLET | Refills: 3 | Status: SHIPPED | OUTPATIENT
Start: 2021-08-30 | End: 2021-10-08

## 2021-08-30 RX ORDER — ISOSORBIDE MONONITRATE 60 MG/1
30 TABLET, EXTENDED RELEASE ORAL DAILY
Qty: 30 TABLET | Refills: 2 | Status: SHIPPED | OUTPATIENT
Start: 2021-08-30 | End: 2022-07-19

## 2021-09-16 DIAGNOSIS — R06.02 SOB (SHORTNESS OF BREATH): ICD-10-CM

## 2021-09-17 RX ORDER — PREDNISONE 10 MG/1
TABLET ORAL
Qty: 30 TABLET | Refills: 0 | Status: SHIPPED | OUTPATIENT
Start: 2021-09-17 | End: 2021-10-05 | Stop reason: DRUGHIGH

## 2021-09-29 ENCOUNTER — HOSPITAL ENCOUNTER (OUTPATIENT)
Dept: MAMMOGRAPHY | Age: 71
Discharge: HOME | End: 2021-09-29
Payer: MEDICARE

## 2021-09-29 ENCOUNTER — HOSPITAL ENCOUNTER (OUTPATIENT)
Age: 71
Discharge: HOME | End: 2021-09-29
Payer: MEDICARE

## 2021-09-29 ENCOUNTER — HOSPITAL ENCOUNTER (OUTPATIENT)
Dept: GENERAL RADIOLOGY | Age: 71
Discharge: HOME | End: 2021-09-29
Payer: MEDICARE

## 2021-09-29 DIAGNOSIS — Z78.0 POSTMENOPAUSAL: ICD-10-CM

## 2021-09-29 DIAGNOSIS — R05.9 COUGH: ICD-10-CM

## 2021-09-29 PROCEDURE — 77080 DXA BONE DENSITY AXIAL: CPT

## 2021-09-29 PROCEDURE — 71046 X-RAY EXAM CHEST 2 VIEWS: CPT

## 2021-10-05 ENCOUNTER — TELEPHONE (OUTPATIENT)
Dept: FAMILY MEDICINE CLINIC | Age: 71
End: 2021-10-05

## 2021-10-05 ENCOUNTER — OFFICE VISIT (OUTPATIENT)
Dept: FAMILY MEDICINE CLINIC | Age: 71
End: 2021-10-05
Payer: MEDICARE

## 2021-10-05 VITALS
HEART RATE: 78 BPM | RESPIRATION RATE: 20 BRPM | OXYGEN SATURATION: 93 % | BODY MASS INDEX: 18.24 KG/M2 | TEMPERATURE: 97.7 F | WEIGHT: 113 LBS | DIASTOLIC BLOOD PRESSURE: 78 MMHG | SYSTOLIC BLOOD PRESSURE: 120 MMHG

## 2021-10-05 DIAGNOSIS — R73.9 HYPERGLYCEMIA: ICD-10-CM

## 2021-10-05 DIAGNOSIS — I10 PRIMARY HYPERTENSION: ICD-10-CM

## 2021-10-05 DIAGNOSIS — J44.1 CHRONIC OBSTRUCTIVE PULMONARY DISEASE WITH ACUTE EXACERBATION (HCC): Primary | ICD-10-CM

## 2021-10-05 DIAGNOSIS — R91.1 PULMONARY NODULE: ICD-10-CM

## 2021-10-05 DIAGNOSIS — M80.00XD AGE-RELATED OSTEOPOROSIS WITH CURRENT PATHOLOGICAL FRACTURE WITH ROUTINE HEALING: ICD-10-CM

## 2021-10-05 DIAGNOSIS — I25.10 CORONARY ARTERY DISEASE INVOLVING NATIVE CORONARY ARTERY OF NATIVE HEART WITHOUT ANGINA PECTORIS: ICD-10-CM

## 2021-10-05 PROBLEM — E87.1 ACUTE HYPONATREMIA: Status: RESOLVED | Noted: 2021-07-19 | Resolved: 2021-10-05

## 2021-10-05 PROBLEM — Z23 NEED FOR PROPHYLACTIC VACCINATION AGAINST STREPTOCOCCUS PNEUMONIAE (PNEUMOCOCCUS): Status: RESOLVED | Noted: 2021-06-07 | Resolved: 2021-10-05

## 2021-10-05 PROBLEM — M81.0 AGE-RELATED OSTEOPOROSIS WITHOUT CURRENT PATHOLOGICAL FRACTURE: Status: ACTIVE | Noted: 2021-10-05

## 2021-10-05 LAB — HBA1C MFR BLD: 5 %

## 2021-10-05 PROCEDURE — 83036 HEMOGLOBIN GLYCOSYLATED A1C: CPT | Performed by: PHYSICIAN ASSISTANT

## 2021-10-05 PROCEDURE — G8399 PT W/DXA RESULTS DOCUMENT: HCPCS | Performed by: PHYSICIAN ASSISTANT

## 2021-10-05 PROCEDURE — G8926 SPIRO NO PERF OR DOC: HCPCS | Performed by: PHYSICIAN ASSISTANT

## 2021-10-05 PROCEDURE — 99214 OFFICE O/P EST MOD 30 MIN: CPT | Performed by: PHYSICIAN ASSISTANT

## 2021-10-05 PROCEDURE — G0008 ADMIN INFLUENZA VIRUS VAC: HCPCS | Performed by: PHYSICIAN ASSISTANT

## 2021-10-05 PROCEDURE — G8427 DOCREV CUR MEDS BY ELIG CLIN: HCPCS | Performed by: PHYSICIAN ASSISTANT

## 2021-10-05 PROCEDURE — 90674 CCIIV4 VAC NO PRSV 0.5 ML IM: CPT | Performed by: PHYSICIAN ASSISTANT

## 2021-10-05 PROCEDURE — G8482 FLU IMMUNIZE ORDER/ADMIN: HCPCS | Performed by: PHYSICIAN ASSISTANT

## 2021-10-05 PROCEDURE — G8419 CALC BMI OUT NRM PARAM NOF/U: HCPCS | Performed by: PHYSICIAN ASSISTANT

## 2021-10-05 PROCEDURE — 1036F TOBACCO NON-USER: CPT | Performed by: PHYSICIAN ASSISTANT

## 2021-10-05 PROCEDURE — 3023F SPIROM DOC REV: CPT | Performed by: PHYSICIAN ASSISTANT

## 2021-10-05 PROCEDURE — 1123F ACP DISCUSS/DSCN MKR DOCD: CPT | Performed by: PHYSICIAN ASSISTANT

## 2021-10-05 PROCEDURE — 3017F COLORECTAL CA SCREEN DOC REV: CPT | Performed by: PHYSICIAN ASSISTANT

## 2021-10-05 PROCEDURE — 4040F PNEUMOC VAC/ADMIN/RCVD: CPT | Performed by: PHYSICIAN ASSISTANT

## 2021-10-05 PROCEDURE — 1090F PRES/ABSN URINE INCON ASSESS: CPT | Performed by: PHYSICIAN ASSISTANT

## 2021-10-05 RX ORDER — PREDNISONE 10 MG/1
TABLET ORAL
Qty: 30 TABLET | Refills: 0 | Status: CANCELLED | OUTPATIENT
Start: 2021-10-05

## 2021-10-05 RX ORDER — ZOSTER VACCINE RECOMBINANT, ADJUVANTED 50 MCG/0.5
0.5 KIT INTRAMUSCULAR SEE ADMIN INSTRUCTIONS
Qty: 0.5 ML | Refills: 0 | Status: SHIPPED | OUTPATIENT
Start: 2021-10-05 | End: 2022-03-24

## 2021-10-05 RX ORDER — PREDNISONE 1 MG/1
5 TABLET ORAL DAILY
Qty: 30 TABLET | Refills: 0 | Status: SHIPPED | OUTPATIENT
Start: 2021-10-05 | End: 2021-10-15

## 2021-10-05 ASSESSMENT — PATIENT HEALTH QUESTIONNAIRE - PHQ9
SUM OF ALL RESPONSES TO PHQ QUESTIONS 1-9: 0
1. LITTLE INTEREST OR PLEASURE IN DOING THINGS: 0
2. FEELING DOWN, DEPRESSED OR HOPELESS: 0
SUM OF ALL RESPONSES TO PHQ9 QUESTIONS 1 & 2: 0

## 2021-10-05 ASSESSMENT — ENCOUNTER SYMPTOMS
COUGH: 1
SHORTNESS OF BREATH: 1
CHEST TIGHTNESS: 1

## 2021-10-05 NOTE — PROGRESS NOTES
Porsha Her Licona  1950  70 y.o.  female    SUBJECTIVE:    Chief Complaint   Patient presents with    Follow-up     patient is here for 4 month follow up       HPI   COPD-severe with hx chronic respiratory failure with hypoxia. Pt on O2 2l continuously. Recently hospitalized in July for acute exacerbation. Pt has not followed up with pulmonology since March. Using daily steroids with somewhat moderated control of symptoms however, recent Dexa positive for osteoporosis. She has been started on Boniva. Tolerating at this time without adverse side effects. Pt noted to have chronic hyponatremia-136 on August 5, 2021. Low dose CT of chest positive for lung nodule but no concerns for malignancy-will need repeat in Feb, 2022. CAD-hx CABG, following with cardiology here in [facility]. Most recent visit 8/12/2021. No changes in treatment noted on review of visit note. Pt reports compliance with medications. Hyperglycemia-noted on review of chart. Has been using steroids chronically for management of COPD. Will need A1C today. HTN-The patient is taking hypertensive medications compliantly without side effects. Denies chest pain, dyspnea, edema, or TIA's.         PHQ Scores 10/5/2021 8/5/2021 6/4/2021 1/5/2021   PHQ2 Score 0 0 2 0   PHQ9 Score 0 0 2 0     Interpretation of Total Score Depression Severity: 1-4 = Minimal depression, 5-9 = Mild depression, 10-14 = Moderate depression, 15-19 = Moderately severe depression, 20-27 = Severe depression     Current Outpatient Medications on File Prior to Visit   Medication Sig Dispense Refill    isosorbide mononitrate (IMDUR) 60 MG extended release tablet Take 0.5 tablets by mouth daily 30 tablet 2    carvedilol (COREG) 12.5 MG tablet TAKE 1 TABLET BY MOUTH TWICE DAILY WITH MEALS 60 tablet 3    ibandronate (BONIVA) 150 MG tablet Take 1 tablet by mouth every 30 days Take one (1) tablet once per month in the morning with a full glass of water, on an empty stomach, and do not take anything else by mouth or lie down for the next 30 minutes. 1 tablet 11    escitalopram (LEXAPRO) 10 MG tablet Take 1 tablet by mouth daily 30 tablet 3    busPIRone (BUSPAR) 5 MG tablet Take 1 tablet by mouth 2 times daily 60 tablet 5    montelukast (SINGULAIR) 10 MG tablet Take 1 tablet by mouth daily 30 tablet 5    doxepin (SINEQUAN) 10 MG capsule Take 1 capsule by mouth nightly 30 capsule 5    lisinopril (PRINIVIL;ZESTRIL) 20 MG tablet Take 0.5 tablets by mouth daily 45 tablet 3    atorvastatin (LIPITOR) 40 MG tablet Take 1 tablet by mouth daily 90 tablet 3    Arformoterol Tartrate (BROVANA) 15 MCG/2ML NEBU Take 1 ampule by nebulization 2 times daily 120 mL 5    nitroGLYCERIN (NITROSTAT) 0.4 MG SL tablet Place 1 tablet under the tongue every 5 minutes as needed for Chest pain 25 tablet 3    albuterol sulfate  (90 Base) MCG/ACT inhaler Inhale 2 puffs into the lungs every 6 hours as needed for Wheezing      Melatonin 10 MG CAPS Take 10 mg by mouth nightly as needed       ipratropium-albuterol (DUONEB) 0.5-2.5 (3) MG/3ML SOLN nebulizer solution Take 3 mLs by nebulization 4 times daily 360 mL 0    aspirin 81 MG chewable tablet Take 1 tablet by mouth daily 30 tablet 3    budesonide (PULMICORT) 0.5 MG/2ML nebulizer suspension Take 2 mLs by nebulization 2 times daily 30 ampule 5    docusate sodium (COLACE) 100 MG capsule Take 100 mg by mouth daily as needed        No current facility-administered medications on file prior to visit. Allergies   Allergen Reactions    Seasonal Itching       Past Medical History:   Diagnosis Date    Acute on chronic respiratory failure with hypoxia ([facility] [address] 7/19/2021    Arthritis     Asthma     CAD (coronary artery disease)     COPD [facility]ch[facility] obstructive [facility] disease) ([facility]     H/O Doppler ultrasound (Bilateral Carotid) 04/26/2018    No hemodynamically significant stenosis noted in the internal carotid artery bilaterally.     H/O echocardiogram 2017; 2020    Left ventricular systolic function is moderately depressed with EF 35-40%. Mild-Moderate Pulmonary HTN.  History of nuclear stress test 2017; 11/3/2020    LVEF is low, EF 40%. Normal perfusion study.  Hyperlipidemia     Hypertension     Mitral regurgitation     Need for prophylactic vaccination against Streptococcus pneumoniae (pneumococcus) 2021    S/P CABG x 4 2015    [place] to LAD & Diag, SVG to Post lat RCA & Ramus       Past Surgical History:   Procedure Laterality Date    APPENDECTOMY      CARDIAC SURGERY      HYSTERECTOMY      INTRACAPSULAR CATARACT EXTRACTION Left 2019    EYE CATARACT EMULSIFICATION IOL IMPLANT performed by Mar Laura MD at [address] [place] [address]      TONSILLECTOMY         Social History     Socioeconomic History    Marital status:      Spouse name: None    Number of children: None    Years of education: None    Highest education level: None   Occupational History    None   Tobacco Use    Smoking status: Former Smoker     Packs/day: 1.00     Years: 40.00     Pack years: 40.00     Types: Cigarettes     Quit date: 5/15/2015     Years since quittin.3    Smokeless tobacco: Never Used   Vaping Use    Vaping Use: Never used   Substance and Sexual Activity    Alcohol use: No     Alcohol/week: 0.0 standard drinks    Drug use: No    Sexual activity: Yes     Partners: Male   Other Topics Concern    None   Social History Narrative    ** Merged History Encounter **          Social Determinants of Health     Financial Resource Strain:     Difficulty of Paying Living Expenses:    Food Insecurity:     Worried About Running Out of Food in the Last Year:     Ran Out of Food in the Last Year:    Transportation Needs:     Lack of Transportation (Medical):      Lack of Transportation (Non-Medical):    Physical Activity:     Days of Exercise per Week:     Minutes of Exercise per Session:    Stress:  Feeling of Stress :    Social Connections:     Frequency of Communication with Friends and Family:     Frequency of Social Gatherings with Friends and Family:     Attends Anabaptism Services:     Active Member of Clubs or Organizations:     Attends Club or Organization Meetings:     Marital Status:    Intimate Partner Violence:     Fear of Current or Ex-Partner:     Emotionally Abused:     Physically Abused:     Sexually Abused:        Review of Systems   Constitutional: Positive for fatigue and unexpected weight change. Negative for chills and fever. Pt reports wt gain over past several months   Respiratory: Positive for cough, chest tightness and shortness of breath. On O2 2L continously   Cardiovascular: Negative. Endocrine: Negative for polydipsia, polyphagia and polyuria. Musculoskeletal: Positive for gait problem. Uses w/c and cane   Skin: Negative. Neurological: Negative for dizziness and headaches. OBJECTIVE:    /78 (Site: Left Upper Arm, Position: Sitting, Cuff Size: Medium Adult)   Pulse 78   Temp 97.7 °F (36.5 °C) (Temporal)   Resp 20   Wt 113 lb (51.3 kg)   LMP  (LMP Unknown)   SpO2 93%   BMI 18.24 kg/m²     Physical Exam  Vitals reviewed. Constitutional:       Appearance: She is ill-appearing. HENT:      Head: Normocephalic. Right Ear: External ear normal.      Left Ear: External ear normal.   Eyes:      Extraocular Movements: Extraocular movements intact. Cardiovascular:      Rate and Rhythm: Normal rate and regular rhythm. Heart sounds: Normal heart sounds. Pulmonary:      Breath sounds: Decreased breath sounds present. Musculoskeletal:         General: Normal range of motion. Cervical back: Normal range of motion. Skin:     General: Skin is warm and dry. Neurological:      Mental Status: She is alert and oriented to person, place, and time.          Claudia Glover:    Problem List        Circulatory    HTN (hypertension)      Well-controlled, continue current medications         CAD (coronary artery disease)     Following with cardiology here in [place] at this time          Relevant Medications    aspirin 81 MG chewable tablet    nitroGLYCERIN (NITROSTAT) 0.4 MG SL tablet    atorvastatin (LIPITOR) 40 MG tablet    lisinopril (PRINIVIL;ZESTRIL) 20 MG tablet    isosorbide mononitrate (IMDUR) 60 MG extended release tablet    carvedilol (COREG) 12.5 MG tablet       Musculoskeletal and Integument    Age-related osteoporosis with current pathological fracture with routine healing     Continue boniva at this time, will decrease steroid dose if pt able to tolerate. Relevant Medications    aspirin 81 MG chewable tablet       Respiratory    Chronic obstructive pulmonary disease ([facility] [address] - Primary     Pt and  advised to f/u with pulmonology , needs f/u appt since recent hospitalization. Will decrease prednisone to 5 mg if pt able to tolerate due to recent wt gain/osteoporosis          Relevant Medications    ipratropium-albuterol (DUONEB) 0.5-2.5 (3) MG/3ML SOLN nebulizer solution    albuterol sulfate  (90 Base) MCG/ACT inhaler    Arformoterol Tartrate (BROVANA) 15 MCG/2ML NEBU    budesonide (PULMICORT) 0.5 MG/2ML nebulizer suspension    montelukast (SINGULAIR) 10 MG tablet    predniSONE (DELTASONE) 5 MG tablet       Other    Pulmonary nodule     Will need repeat CT in Feb, 2022          Hyperglycemia     A1C now          Relevant Orders    POCT glycosylated hemoglobin (Hb A1C)               Return in about 4 months (around 2/5/2022).

## 2021-10-05 NOTE — ASSESSMENT & PLAN NOTE
Pt and  advised to f/u with pulmonology , needs f/u appt since recent hospitalization.    Will decrease prednisone to 5 mg if pt able to tolerate due to recent wt gain/osteoporosis

## 2021-10-08 RX ORDER — CARVEDILOL 12.5 MG/1
TABLET ORAL
Qty: 60 TABLET | Refills: 1 | Status: SHIPPED | OUTPATIENT
Start: 2021-10-08 | End: 2021-12-08

## 2021-10-11 ENCOUNTER — HOSPITAL ENCOUNTER (EMERGENCY)
Age: 71
Discharge: HOME | End: 2021-10-11
Attending: EMERGENCY MEDICINE
Payer: MEDICARE

## 2021-10-11 ENCOUNTER — TELEPHONE (OUTPATIENT)
Dept: PULMONOLOGY | Age: 71
End: 2021-10-11

## 2021-10-11 ENCOUNTER — TELEPHONE (OUTPATIENT)
Dept: FAMILY MEDICINE CLINIC | Age: 71
End: 2021-10-11

## 2021-10-11 ENCOUNTER — APPOINTMENT (OUTPATIENT)
Dept: GENERAL RADIOLOGY | Age: 71
End: 2021-10-11
Payer: MEDICARE

## 2021-10-11 VITALS
RESPIRATION RATE: 25 BRPM | TEMPERATURE: 98.5 F | OXYGEN SATURATION: 97 % | BODY MASS INDEX: 19.29 KG/M2 | SYSTOLIC BLOOD PRESSURE: 106 MMHG | HEIGHT: 66 IN | HEART RATE: 77 BPM | WEIGHT: 120 LBS | DIASTOLIC BLOOD PRESSURE: 56 MMHG

## 2021-10-11 DIAGNOSIS — J44.1 COPD EXACERBATION (HCC): Primary | ICD-10-CM

## 2021-10-11 DIAGNOSIS — U07.1 COVID-19: ICD-10-CM

## 2021-10-11 LAB
ANION GAP SERPL CALCULATED.3IONS-SCNC: 11 MMOL/L (ref 4–16)
BASOPHILS ABSOLUTE: 0.1 K/CU MM
BASOPHILS RELATIVE PERCENT: 0.6 % (ref 0–1)
BUN BLDV-MCNC: 13 MG/DL (ref 6–23)
CALCIUM SERPL-MCNC: 8.6 MG/DL (ref 8.3–10.6)
CHLORIDE BLD-SCNC: 98 MMOL/L (ref 99–110)
CO2: 23 MMOL/L (ref 21–32)
CREAT SERPL-MCNC: 0.5 MG/DL (ref 0.6–1.1)
DIFFERENTIAL TYPE: ABNORMAL
EOSINOPHILS ABSOLUTE: 0.1 K/CU MM
EOSINOPHILS RELATIVE PERCENT: 1.1 % (ref 0–3)
FERRITIN: 79 NG/ML (ref 15–150)
FIBRINOGEN LEVEL: 342 MG/DL (ref 196.9–442.1)
GFR AFRICAN AMERICAN: >60 ML/MIN/1.73M2
GFR NON-AFRICAN AMERICAN: >60 ML/MIN/1.73M2
GLUCOSE BLD-MCNC: 100 MG/DL (ref 70–99)
HCT VFR BLD CALC: 39.2 % (ref 37–47)
HEMOGLOBIN: 12.7 GM/DL (ref 12.5–16)
HIGH SENSITIVE C-REACTIVE PROTEIN: 53 MG/L
IMMATURE NEUTROPHIL %: 0.3 % (ref 0–0.43)
LYMPHOCYTES ABSOLUTE: 2.2 K/CU MM
LYMPHOCYTES RELATIVE PERCENT: 22.8 % (ref 24–44)
MCH RBC QN AUTO: 31.5 PG (ref 27–31)
MCHC RBC AUTO-ENTMCNC: 32.4 % (ref 32–36)
MCV RBC AUTO: 97.3 FL (ref 78–100)
MONOCYTES ABSOLUTE: 1.3 K/CU MM
MONOCYTES RELATIVE PERCENT: 13.4 % (ref 0–4)
PDW BLD-RTO: 13.2 % (ref 11.7–14.9)
PLATELET # BLD: 247 K/CU MM (ref 140–440)
PMV BLD AUTO: 9.6 FL (ref 7.5–11.1)
POTASSIUM SERPL-SCNC: 4.2 MMOL/L (ref 3.5–5.1)
RBC # BLD: 4.03 M/CU MM (ref 4.2–5.4)
SEGMENTED NEUTROPHILS ABSOLUTE COUNT: 5.9 K/CU MM
SEGMENTED NEUTROPHILS RELATIVE PERCENT: 61.8 % (ref 36–66)
SODIUM BLD-SCNC: 132 MMOL/L (ref 135–145)
TOTAL IMMATURE NEUTOROPHIL: 0.03 K/CU MM
TROPONIN T: <0.01 NG/ML
WBC # BLD: 9.5 K/CU MM (ref 4–10.5)

## 2021-10-11 PROCEDURE — 84484 ASSAY OF TROPONIN QUANT: CPT

## 2021-10-11 PROCEDURE — 86141 C-REACTIVE PROTEIN HS: CPT

## 2021-10-11 PROCEDURE — 85025 COMPLETE CBC W/AUTO DIFF WBC: CPT

## 2021-10-11 PROCEDURE — 94640 AIRWAY INHALATION TREATMENT: CPT

## 2021-10-11 PROCEDURE — 99285 EMERGENCY DEPT VISIT HI MDM: CPT

## 2021-10-11 PROCEDURE — 6370000000 HC RX 637 (ALT 250 FOR IP): Performed by: EMERGENCY MEDICINE

## 2021-10-11 PROCEDURE — 85384 FIBRINOGEN ACTIVITY: CPT

## 2021-10-11 PROCEDURE — 6360000002 HC RX W HCPCS: Performed by: EMERGENCY MEDICINE

## 2021-10-11 PROCEDURE — 2580000003 HC RX 258: Performed by: EMERGENCY MEDICINE

## 2021-10-11 PROCEDURE — 96375 TX/PRO/DX INJ NEW DRUG ADDON: CPT

## 2021-10-11 PROCEDURE — 93005 ELECTROCARDIOGRAM TRACING: CPT | Performed by: EMERGENCY MEDICINE

## 2021-10-11 PROCEDURE — 96365 THER/PROPH/DIAG IV INF INIT: CPT

## 2021-10-11 PROCEDURE — 80048 BASIC METABOLIC PNL TOTAL CA: CPT

## 2021-10-11 PROCEDURE — 71045 X-RAY EXAM CHEST 1 VIEW: CPT

## 2021-10-11 PROCEDURE — 82728 ASSAY OF FERRITIN: CPT

## 2021-10-11 RX ORDER — DEXAMETHASONE SODIUM PHOSPHATE 10 MG/ML
10 INJECTION, SOLUTION INTRAMUSCULAR; INTRAVENOUS EVERY 6 HOURS
Status: DISCONTINUED | OUTPATIENT
Start: 2021-10-11 | End: 2021-10-11 | Stop reason: HOSPADM

## 2021-10-11 RX ORDER — IPRATROPIUM BROMIDE AND ALBUTEROL SULFATE 2.5; .5 MG/3ML; MG/3ML
1 SOLUTION RESPIRATORY (INHALATION) ONCE
Status: DISCONTINUED | OUTPATIENT
Start: 2021-10-11 | End: 2021-10-11

## 2021-10-11 RX ORDER — ALBUTEROL SULFATE 90 UG/1
4 AEROSOL, METERED RESPIRATORY (INHALATION) EVERY 4 HOURS PRN
Status: DISCONTINUED | OUTPATIENT
Start: 2021-10-11 | End: 2021-10-11 | Stop reason: HOSPADM

## 2021-10-11 RX ADMIN — DEXAMETHASONE SODIUM PHOSPHATE 10 MG: 10 INJECTION, SOLUTION INTRAMUSCULAR; INTRAVENOUS at 16:02

## 2021-10-11 RX ADMIN — ALBUTEROL SULFATE 4 PUFF: 108 INHALANT RESPIRATORY (INHALATION) at 15:28

## 2021-10-11 RX ADMIN — CASIRIVIMAB AND IMDEVIMAB: 600; 600 INJECTION, SOLUTION, CONCENTRATE INTRAVENOUS at 16:07

## 2021-10-11 RX ADMIN — IPRATROPIUM BROMIDE 4 PUFF: 17 AEROSOL, METERED RESPIRATORY (INHALATION) at 15:28

## 2021-10-11 ASSESSMENT — ENCOUNTER SYMPTOMS
COUGH: 1
WHEEZING: 1
SHORTNESS OF BREATH: 1
GASTROINTESTINAL NEGATIVE: 1

## 2021-10-11 NOTE — TELEPHONE ENCOUNTER
Called pt because she missed her appt today 10/11, pt states she missed because she tested positive for Covid last night and is not doing very well. Pt states she is having a lot of trouble breathing and coughing, but just wants to stay home and nap. Pt states she will have her  call us back to reschedule. I let her know I will forward this to Lazarus Roca so she is aware. 14:15 10/11/2020  Spoke with Gianna Degroot's  who states that both Cipriano Perez and himself were tested for Covid last night and were informed that their test results were positive. Cipriano Perez is having significant difficulty with breathing. She has had a nonproductive cough and has been sleeping almost all day. I spoke briefly with Cipriano Perez she sounds as if she is having severe respiratory distress. She is unable to speak more than 2 words and is struggling at just speaking 2 words due to severe shortness of breath. She states at 1st she thought her symptoms were just those of sinus infection and seasonal allergies with it being harvest time. She states then she noticed a burning sensation in her nose as if someone was putting something hot into her nose. She states then she started having coughing and shortness of breath. She was seen in primary care provider's office and tested positive for Covid yesterday evening. I advised Cipriano Perez and her  Pao Rose to call the emergency squad and be transported to [facility] ED. I have spoken to Silvino Hdez at [facility] ED and informed her of patient's complaints, presentation and need for further evaluation in the emergency room.

## 2021-10-11 NOTE — ED PROVIDER NOTES
Triage Chief Complaint:   Positive For Covid-19 (STATES BEGAN GETTING SOB 2 DAYS AGO. TESTED POSITIVE FOR COVID YESTERDAY. MORE SOB TODAY. PATIENT USUALLY WEARS O2 AT 2 LITERS. INCREASED TO 4 LITERS TODAY. STATES TEMP OF 99 AT HOME. COUGHING UP CLEAR MUCOUS. ), Shortness of Breath, and Cough    [place]:  Harley Oakes is a 70 y.o. female that presents to the ED complaining of cough shortness of breath. She was tested for Covid yesterday and tells me she was positive symptoms began a couple days prior. She does have chronic respiratory failure on oxygen at home currently sat 97% on 2 L after sent to the room she does have a cough no purulent sputum production she denies any pain pressure tightness in her chest.  She was having a temperature about 99 at home. She is not having any back or flank discomfort at appetite has been good she has lost some about her smell but not her taste. No gross loose stools. HPI    Past Medical History:   Diagnosis Date    Acute on chronic respiratory failure with hypoxia ([address] 7/19/2021    Arthritis     Asthma     CAD (coronary artery disease)     COPD [facility]ch[facility] disease) ([facility]     H/O Doppler ultrasound (Bilateral Carotid) 04/26/2018    No hemodynamically significant stenosis noted in the internal carotid artery bilaterally.  H/O echocardiogram 03/07/2017; 12/8/2020    Left ventricular systolic function is moderately depressed with EF 35-40%. Mild-Moderate Pulmonary HTN.  History of nuclear stress test 03/07/2017; 11/3/2020    LVEF is low, EF 40%. Normal perfusion study.     Hyperlipidemia     Hypertension     Mitral regurgitation     Need for prophylactic vaccination against Streptococcus pneumoniae (pneumococcus) 6/7/2021    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    [place]ima to LAD & Diag, SVG to Post lat RCA & Ramus     Past Surgical History:   Procedure Laterality Date    APPENDECTOMY      CARDIAC SURGERY      HYSTERECTOMY      INTRACAPSULAR CATARACT EXTRACTION Left 2019    EYE CATARACT EMULSIFICATION IOL IMPLANT performed by Jose Alfredo Torres MD at [address] [address]      TONSILLECTOMY       Family History   Problem Relation Age of Onset    Other Mother         copd, emphysema    Colon Cancer Sister     Other Brother         copd, agent orange    Other Brother         agent orange     Social History     Socioeconomic History    Marital status:      Spouse name: Not on file    Number of children: Not on file    Years of education: Not on file    Highest education level: Not on file   Occupational History    Not on file   Tobacco Use    Smoking status: Former Smoker     Packs/day: 1.00     Years: 40.00     Pack years: 40.00     Types: Cigarettes     Quit date: 5/15/2015     Years since quittin.4    Smokeless tobacco: Never Used   Vaping Use    Vaping Use: Never used   Substance and Sexual Activity    Alcohol use: No     Alcohol/week: 0.0 standard drinks    Drug use: No    Sexual activity: Yes     Partners: Male   Other Topics Concern    Not on file   Social History Narrative    ** Merged History Encounter **          Social Determinants of Health     Financial Resource Strain:     Difficulty of Paying Living Expenses:    Food Insecurity:     Worried About Running Out of Food in the Last Year:     Ran Out of Food in the Last Year:    Transportation Needs:     Lack of Transportation (Medical):      Lack of Transportation (Non-Medical):    Physical Activity:     Days of Exercise per Week:     Minutes of Exercise per Session:    Stress:     Feeling of Stress :    Social Connections:     Frequency of Communication with Friends and Family:     Frequency of Social Gatherings with Friends and Family:     Attends Worship Services:     Active Member of Clubs or Organizations:     Attends Club or Organization Meetings:     Marital Status:    Intimate Partner Violence:     Fear of Current or Ex-Partner:     Emotionally Abused:     Physically Abused:     Sexually Abused:      Current Facility-Administered Medications   Medication Dose Route Frequency Provider Last Rate Last Admin    dexamethasone (PF) (DECADRON) injection 10 mg  10 mg IntraVENous Q6H Mandeep Milli, DO   10 mg at 10/11/21 1602    albuterol sulfate  (90 Base) MCG/ACT inhaler 4 puff  4 puff Inhalation Q4H PRN Savannah Blair DO   4 puff at 10/11/21 1528     Current Outpatient Medications   Medication Sig Dispense Refill    carvedilol (COREG) 12.5 MG tablet TAKE 1 TABLET BY MOUTH TWICE DAILY WITH MEALS 60 tablet 1    zoster recombinant adjuvanted vaccine (SHINGRIX) 50 MCG/0.5ML SUSR injection Inject 0.5 mLs into the muscle See Admin Instructions 1 dose now and repeat in 2-6 months 0.5 mL 0    predniSONE (DELTASONE) 5 MG tablet Take 1 tablet by mouth daily for 10 days 30 tablet 0    isosorbide mononitrate (IMDUR) 60 MG extended release tablet Take 0.5 tablets by mouth daily 30 tablet 2    ibandronate (BONIVA) 150 MG tablet Take 1 tablet by mouth every 30 days Take one (1) tablet once per month in the morning with a full glass of water, on an empty stomach, and do not take anything else by mouth or lie down for the next 30 minutes.  1 tablet 11    escitalopram (LEXAPRO) 10 MG tablet Take 1 tablet by mouth daily 30 tablet 3    busPIRone (BUSPAR) 5 MG tablet Take 1 tablet by mouth 2 times daily 60 tablet 5    montelukast (SINGULAIR) 10 MG tablet Take 1 tablet by mouth daily 30 tablet 5    doxepin (SINEQUAN) 10 MG capsule Take 1 capsule by mouth nightly 30 capsule 5    lisinopril (PRINIVIL;ZESTRIL) 20 MG tablet Take 0.5 tablets by mouth daily 45 tablet 3    atorvastatin (LIPITOR) 40 MG tablet Take 1 tablet by mouth daily 90 tablet 3    Arformoterol Tartrate (BROVANA) 15 MCG/2ML NEBU Take 1 ampule by nebulization 2 times daily 120 mL 5    budesonide (PULMICORT) 0.5 MG/2ML nebulizer suspension Take 2 mLs by nebulization 2 times daily 30 ampule 5    nitroGLYCERIN (NITROSTAT) 0.4 MG SL tablet Place 1 tablet under the tongue every 5 minutes as needed for Chest pain 25 tablet 3    albuterol sulfate  (90 Base) MCG/ACT inhaler Inhale 2 puffs into the lungs every 6 hours as needed for Wheezing      Melatonin 10 MG CAPS Take 10 mg by mouth nightly as needed       ipratropium-albuterol (DUONEB) 0.5-2.5 (3) MG/3ML SOLN nebulizer solution Take 3 mLs by nebulization 4 times daily 360 mL 0    docusate sodium (COLACE) 100 MG capsule Take 100 mg by mouth daily as needed       aspirin 81 MG chewable tablet Take 1 tablet by mouth daily 30 tablet 3     Allergies   Allergen Reactions    Seasonal Itching         ROS:    Review of Systems   Constitutional: Positive for fatigue and fever. HENT: Positive for congestion. Respiratory: Positive for cough, shortness of breath and wheezing. Cardiovascular: Negative. Gastrointestinal: Negative. Musculoskeletal: Positive for myalgias. All other systems reviewed and are negative. Nursing Notes Reviewed    Physical Exam:  ED Triage Vitals [10/11/21 1450]   Enc Vitals Group      /75      Pulse 81      Resp 28      Temp 98.5 °F (36.9 °C)      Temp Source Oral      SpO2 97 %      Weight 120 lb (54.4 kg)      Height 5' 6\" (1.676 m)      Head Circumference       Peak Flow       Pain Score       Pain Loc       Pain Edu? Excl. in [address]? Physical Exam  Vitals and nursing note reviewed. Constitutional:       General: She is not in acute distress. Appearance: She is well-developed. She is ill-appearing. HENT:      Head: Normocephalic and atraumatic. Right Ear: External ear normal.      Left Ear: External ear normal.      Mouth/Throat:      Mouth: Mucous membranes are moist.   Eyes:      General: No scleral icterus. Right eye: No discharge. Left eye: No discharge.       Conjunctiva/sclera: Conjunctivae normal.      Pupils: Pupils are equal, round, RDW 13.2 11.7 - 14.9 %    Platelets 181 833 - 734 K/CU MM    MPV 9.6 7.5 - 11.1 FL    Differential Type AUTOMATED DIFFERENTIAL     Segs Relative 61.8 36 - 66 %    Lymphocytes % 22.8 (L) 24 - 44 %    Monocytes % 13.4 (H) 0 - 4 %    Eosinophils % 1.1 0 - 3 %    Basophils % 0.6 0 - 1 %    Segs Absolute 5.9 K/CU MM    Lymphocytes Absolute 2.2 K/CU MM    Monocytes Absolute 1.3 K/CU MM    Eosinophils Absolute 0.1 K/CU MM    Basophils Absolute 0.1 K/CU MM    Immature Neutrophil % 0.3 0 - 0.43 %    Total Immature Neutrophil 0.03 K/CU MM   Basic Metabolic Panel w/ Reflex to MG   Result Value Ref Range    Sodium 132 (L) 135 - 145 MMOL/L    Potassium 4.2 3.5 - 5.1 MMOL/L    Chloride 98 (L) 99 - 110 mMol/L    CO2 23 21 - 32 MMOL/L    Anion Gap 11 4 - 16    BUN 13 6 - 23 MG/DL    CREATININE 0.5 (L) 0.6 - 1.1 MG/DL    Glucose 100 (H) 70 - 99 MG/DL    Calcium 8.6 8.3 - 10.6 MG/DL    GFR Non-African American >60 >60 mL/min/1.73m2    GFR African American >60 >60 mL/min/1.73m2   Troponin   Result Value Ref Range    Troponin T <0.010 <0.01 NG/ML   EKG 12 Lead   Result Value Ref Range    Ventricular Rate 81 BPM    Atrial Rate 81 BPM    P-R Interval 186 ms    QRS Duration 124 ms    Q-T Interval 394 ms    QTc Calculation (Bazett) 457 ms    P Axis 52 degrees    R Axis -72 degrees    T Axis 104 degrees    Diagnosis       Normal sinus rhythm  Left axis deviation  Left bundle branch block  Abnormal ECG  When compared with ECG of 19-JUL-2021 12:48,  No significant change was found        Radiographs (if obtained):  [] The following radiograph wasinterpreted by myself in the absence of a radiologist:   [] Radiologist's Report Reviewed:  XR CHEST PORTABLE   Final Result   1. No significant interval change from previous exam dated September 29, 2021. No well-defined consolidation identified. Chronic underlying lung   changes redemonstrated.                EKG (if obtained): (All EKG's are interpreted by myself in the absence of a Z-score of -2.4. This is within the osteoporosis range by WHO criteria. Osteoporosis by WHO criteria. MDM:      Patient presents to the ED with exacerbation of COPD. In the face of Covid chest x-ray was obtained first and reveals no pneumonia no multifocal changes. She does meet criteria for the outpatient monoclonal antibody I will give it to her in the ED that will be much more patient fairly convenient. She was observed improvement with metered-dose inhaler albuterol and Atrovent she did receive telegram to dexamethasone. Inflammatory markers are still pending will be helpful further risk factor modification and guidance as an outpatient she fortunately does not warrant hospitalization she does have oxygen at home she will maintain at her 2 L she is improved agrees with the plan understands her condition could still progress get worse in the event she feels breathless as any of the symptoms she is welcome to call 911 and return to the ED. Please note that portions of this note may have been completed with a voice recognition/dictation program. Efforts were made to edit the dictations but occasionally words are mis-transcribed.      All pertinent Lab data and radiographic results reviewed with patient at bedside. The patient and/or the family were informed of the results of any tests/labs/imaging, the treatment plan, and time was allotted to answer questions. See chart for details of medications given during the ED stay.     The likelihood of other entities in the differential is insufficient to justify any further testing for them. This was explained to the patient. The patient was advised that persistent or worsening symptoms would require further evaluation.                Clinical Impression:  1.  COPD exacerbation ([address]    2. COVID-19      Disposition referral (if applicable):  Solitario Ann PA-C  [address] [place] [address]  [phone]    Schedule an appointment as soon as possible for a visit   If symptoms worsen    Disposition medications (if applicable):  New Prescriptions    No medications on file           Mandeep Morley DO, FACEP      Comment: Please note this report has been produced using speech recognition software and maycontain errors related to that system including errors in grammar, punctuation, and spelling, as well as words and phrases that may be inappropriate. If there are any questions or concerns please feel free to contact thedictating provider for clarification.         Panda Leon DO  10/11/21 3894

## 2021-10-11 NOTE — ED TRIAGE NOTES
Arrived PER [facility] to room 4 for triage. Tolerated without difficulty. Bed in lowest position. Call light given. Gowned for exam. Monitor applied. O2 @ 2 L NC. EKG obtained.

## 2021-10-11 NOTE — ED NOTES
Discharge instructions reviewed with patient. Reviewed prescriptions with patient. No additional questions asked. Voiced understanding. Encouraged patient to follow up as discussed by the ED physician. Reviewed how to access [employer] at discharged with patient. Encourage to sign up either on their smartphone or on the computer to be able to review the information form today's and future visits. Voiced understanding. No additional questions asked.       Kamran Heck RN  10/11/21 [id number]

## 2021-10-12 ENCOUNTER — CARE COORDINATION (OUTPATIENT)
Dept: CARE COORDINATION | Age: 71
End: 2021-10-12

## 2021-10-12 LAB
EKG ATRIAL RATE: 81 BPM
EKG DIAGNOSIS: NORMAL
EKG P AXIS: 52 DEGREES
EKG P-R INTERVAL: 186 MS
EKG Q-T INTERVAL: 394 MS
EKG QRS DURATION: 124 MS
EKG QTC CALCULATION (BAZETT): 457 MS
EKG R AXIS: -72 DEGREES
EKG T AXIS: 104 DEGREES
EKG VENTRICULAR RATE: 81 BPM

## 2021-10-12 PROCEDURE — 93010 ELECTROCARDIOGRAM REPORT: CPT | Performed by: INTERNAL MEDICINE

## 2021-10-12 NOTE — TELEPHONE ENCOUNTER
Called and spoke to patients  he is agreeable to the antibody infusion  would like to have order sent please advise

## 2021-10-12 NOTE — CARE COORDINATION
Attempted to reach patient for ACM follow up. No answer to phone. First contact mailbox is full. Message left with ACM contact information and request for call back on second contact.

## 2021-10-13 ENCOUNTER — CARE COORDINATION (OUTPATIENT)
Dept: CARE COORDINATION | Age: 71
End: 2021-10-13

## 2021-10-13 ENCOUNTER — TELEPHONE (OUTPATIENT)
Dept: INFUSION THERAPY | Age: 71
End: 2021-10-13

## 2021-10-13 NOTE — TELEPHONE ENCOUNTER
ATTEMPTED TO REACH PT TO SCHEDULE REGENERON INFUSION. BOTH PHONE #'S LISTED NO SUCCESS. ONE MAILBOX IS FULL AND THE OTHER MAILBOX IS NOT SET UP YET.

## 2021-10-13 NOTE — CARE COORDINATION
Attempted to reach patient for [facility] ER follow up. No answer to phone. Message left with M contact information and request for call back.

## 2021-10-15 ENCOUNTER — HOSPITAL ENCOUNTER (OUTPATIENT)
Dept: INFUSION THERAPY | Age: 71
Setting detail: INFUSION SERIES
Discharge: HOME | End: 2021-10-15
Payer: MEDICARE

## 2021-10-15 ENCOUNTER — TELEPHONE (OUTPATIENT)
Dept: INFUSION THERAPY | Age: 71
End: 2021-10-15

## 2021-10-15 VITALS
OXYGEN SATURATION: 95 % | TEMPERATURE: 97.1 F | HEART RATE: 69 BPM | RESPIRATION RATE: 20 BRPM | SYSTOLIC BLOOD PRESSURE: 166 MMHG | DIASTOLIC BLOOD PRESSURE: 77 MMHG

## 2021-10-15 PROCEDURE — 6360000002 HC RX W HCPCS: Performed by: PHYSICIAN ASSISTANT

## 2021-10-15 PROCEDURE — M0243 CASIRIVI AND IMDEVI INFUSION: HCPCS

## 2021-10-15 PROCEDURE — 2580000003 HC RX 258: Performed by: PHYSICIAN ASSISTANT

## 2021-10-15 RX ADMIN — CASIRIVIMAB AND IMDEVIMAB: 600; 600 INJECTION, SOLUTION, CONCENTRATE INTRAVENOUS at 13:53

## 2021-11-15 ENCOUNTER — TELEPHONE (OUTPATIENT)
Dept: FAMILY MEDICINE CLINIC | Age: 71
End: 2021-11-15

## 2021-11-17 ENCOUNTER — OFFICE VISIT (OUTPATIENT)
Dept: FAMILY MEDICINE CLINIC | Age: 71
End: 2021-11-17
Payer: MEDICARE

## 2021-11-17 VITALS
DIASTOLIC BLOOD PRESSURE: 70 MMHG | BODY MASS INDEX: 19.69 KG/M2 | TEMPERATURE: 97.7 F | RESPIRATION RATE: 22 BRPM | WEIGHT: 122 LBS | SYSTOLIC BLOOD PRESSURE: 120 MMHG | OXYGEN SATURATION: 84 % | HEART RATE: 77 BPM

## 2021-11-17 DIAGNOSIS — L30.9 DERMATITIS: Primary | ICD-10-CM

## 2021-11-17 PROCEDURE — 3017F COLORECTAL CA SCREEN DOC REV: CPT | Performed by: NURSE PRACTITIONER

## 2021-11-17 PROCEDURE — 1036F TOBACCO NON-USER: CPT | Performed by: NURSE PRACTITIONER

## 2021-11-17 PROCEDURE — 1090F PRES/ABSN URINE INCON ASSESS: CPT | Performed by: NURSE PRACTITIONER

## 2021-11-17 PROCEDURE — G8399 PT W/DXA RESULTS DOCUMENT: HCPCS | Performed by: NURSE PRACTITIONER

## 2021-11-17 PROCEDURE — G8420 CALC BMI NORM PARAMETERS: HCPCS | Performed by: NURSE PRACTITIONER

## 2021-11-17 PROCEDURE — G8427 DOCREV CUR MEDS BY ELIG CLIN: HCPCS | Performed by: NURSE PRACTITIONER

## 2021-11-17 PROCEDURE — G8482 FLU IMMUNIZE ORDER/ADMIN: HCPCS | Performed by: NURSE PRACTITIONER

## 2021-11-17 PROCEDURE — 1123F ACP DISCUSS/DSCN MKR DOCD: CPT | Performed by: NURSE PRACTITIONER

## 2021-11-17 PROCEDURE — 4040F PNEUMOC VAC/ADMIN/RCVD: CPT | Performed by: NURSE PRACTITIONER

## 2021-11-17 PROCEDURE — 99213 OFFICE O/P EST LOW 20 MIN: CPT | Performed by: NURSE PRACTITIONER

## 2021-11-17 RX ORDER — HYDROXYZINE HYDROCHLORIDE 10 MG/1
10 TABLET, FILM COATED ORAL EVERY 8 HOURS PRN
Qty: 30 TABLET | Refills: 0 | Status: SHIPPED | OUTPATIENT
Start: 2021-11-17 | End: 2021-11-27

## 2021-11-17 RX ORDER — ESCITALOPRAM OXALATE 10 MG/1
10 TABLET ORAL DAILY
Qty: 30 TABLET | Refills: 0 | Status: SHIPPED | OUTPATIENT
Start: 2021-11-17 | End: 2022-03-24

## 2021-11-17 RX ORDER — LORATADINE 10 MG/1
10 TABLET ORAL DAILY
Qty: 15 TABLET | Refills: 0 | Status: SHIPPED | OUTPATIENT
Start: 2021-11-17 | End: 2022-03-24

## 2021-11-17 ASSESSMENT — PATIENT HEALTH QUESTIONNAIRE - PHQ9
1. LITTLE INTEREST OR PLEASURE IN DOING THINGS: 0
SUM OF ALL RESPONSES TO PHQ QUESTIONS 1-9: 0
SUM OF ALL RESPONSES TO PHQ9 QUESTIONS 1 & 2: 0
SUM OF ALL RESPONSES TO PHQ QUESTIONS 1-9: 0
2. FEELING DOWN, DEPRESSED OR HOPELESS: 0
SUM OF ALL RESPONSES TO PHQ QUESTIONS 1-9: 0

## 2021-11-17 NOTE — PROGRESS NOTES
Solomon Hill (:  1950) is a 70 y.o. female,Established patient, here for evaluation of the following chief complaint(s):  Skin Problem (patient has a rash on her chest on her abdomen and on her legs the areas are small red bumps that itch she had the infusion for covid and started with the rash )         ASSESSMENT/PLAN:  1. Dermatitis  -     triamcinolone (KENALOG) 0.1 % ointment; Apply topically 2 times daily for 7 days, Topical, 2 TIMES DAILY Starting 2021, Until 2021, For 7 days, Disp-15 g, R-1, Normal  -     loratadine (CLARITIN) 10 MG tablet; Take 1 tablet by mouth daily, Disp-15 tablet, R-0Normal  -     hydrOXYzine (ATARAX) 10 MG tablet; Take 1 tablet by mouth every 8 hours as needed for Itching, Disp-30 tablet, R-0Normal    Daily claritinf or two weeks. Use atarax PRN for itching, kenalog as needed for itchy rash. FU if not improving. Return if symptoms worsen or fail to improve, for With primary care provider. Subjective   SUBJECTIVE/OBJECTIVE:  HPI   Having an itchy rash since infusion Regneron for COVID . States she is not bounchign back since infection 10/11/2021. Still feeling short of breath and having trouble with hair falling out. Rash on chest, belly,a timothy, legs. Feels \"like when you are allergic to wool. \"       Review of Systems   Constitutional: Negative for chills, diaphoresis and fever. Respiratory: Positive for cough (baseline) and shortness of breath. Cardiovascular: Negative. Skin: Positive for rash. Neurological: Negative. Objective   Physical Exam  Constitutional:       Appearance: Normal appearance. HENT:      Head: Normocephalic and atraumatic. Cardiovascular:      Rate and Rhythm: Normal rate and regular rhythm. Heart sounds: Normal heart sounds. No murmur heard. Pulmonary:      Effort: No respiratory distress. Breath sounds: Normal breath sounds.    Musculoskeletal:      Cervical back: Normal

## 2021-11-18 ENCOUNTER — OFFICE VISIT (OUTPATIENT)
Dept: CARDIOLOGY CLINIC | Age: 71
End: 2021-11-18
Payer: MEDICARE

## 2021-11-18 VITALS
DIASTOLIC BLOOD PRESSURE: 90 MMHG | SYSTOLIC BLOOD PRESSURE: 164 MMHG | BODY MASS INDEX: 19.99 KG/M2 | HEART RATE: 76 BPM | WEIGHT: 120 LBS | HEIGHT: 65 IN

## 2021-11-18 DIAGNOSIS — I20.9 ISCHEMIC CHEST PAIN (HCC): ICD-10-CM

## 2021-11-18 DIAGNOSIS — Z95.1 S/P CABG X 4: ICD-10-CM

## 2021-11-18 DIAGNOSIS — I27.20 PULMONARY HTN (HCC): Primary | Chronic | ICD-10-CM

## 2021-11-18 DIAGNOSIS — I34.0 NONRHEUMATIC MITRAL VALVE REGURGITATION: ICD-10-CM

## 2021-11-18 DIAGNOSIS — I25.10 CORONARY ARTERY DISEASE INVOLVING NATIVE CORONARY ARTERY OF NATIVE HEART WITHOUT ANGINA PECTORIS: ICD-10-CM

## 2021-11-18 DIAGNOSIS — I25.5 ISCHEMIC CARDIOMYOPATHY: ICD-10-CM

## 2021-11-18 DIAGNOSIS — I10 PRIMARY HYPERTENSION: ICD-10-CM

## 2021-11-18 DIAGNOSIS — J96.11 CHRONIC RESPIRATORY FAILURE WITH HYPOXIA (HCC): ICD-10-CM

## 2021-11-18 DIAGNOSIS — J44.1 CHRONIC OBSTRUCTIVE PULMONARY DISEASE WITH ACUTE EXACERBATION (HCC): ICD-10-CM

## 2021-11-18 DIAGNOSIS — E87.1 HYPONATREMIA: ICD-10-CM

## 2021-11-18 PROCEDURE — G8420 CALC BMI NORM PARAMETERS: HCPCS | Performed by: INTERNAL MEDICINE

## 2021-11-18 PROCEDURE — 1036F TOBACCO NON-USER: CPT | Performed by: INTERNAL MEDICINE

## 2021-11-18 PROCEDURE — G8926 SPIRO NO PERF OR DOC: HCPCS | Performed by: INTERNAL MEDICINE

## 2021-11-18 PROCEDURE — 4040F PNEUMOC VAC/ADMIN/RCVD: CPT | Performed by: INTERNAL MEDICINE

## 2021-11-18 PROCEDURE — 3023F SPIROM DOC REV: CPT | Performed by: INTERNAL MEDICINE

## 2021-11-18 PROCEDURE — G8399 PT W/DXA RESULTS DOCUMENT: HCPCS | Performed by: INTERNAL MEDICINE

## 2021-11-18 PROCEDURE — 1090F PRES/ABSN URINE INCON ASSESS: CPT | Performed by: INTERNAL MEDICINE

## 2021-11-18 PROCEDURE — 99214 OFFICE O/P EST MOD 30 MIN: CPT | Performed by: INTERNAL MEDICINE

## 2021-11-18 PROCEDURE — G8482 FLU IMMUNIZE ORDER/ADMIN: HCPCS | Performed by: INTERNAL MEDICINE

## 2021-11-18 PROCEDURE — 3017F COLORECTAL CA SCREEN DOC REV: CPT | Performed by: INTERNAL MEDICINE

## 2021-11-18 PROCEDURE — 1123F ACP DISCUSS/DSCN MKR DOCD: CPT | Performed by: INTERNAL MEDICINE

## 2021-11-18 PROCEDURE — G8427 DOCREV CUR MEDS BY ELIG CLIN: HCPCS | Performed by: INTERNAL MEDICINE

## 2021-11-18 RX ORDER — LISINOPRIL 20 MG/1
20 TABLET ORAL DAILY
Qty: 45 TABLET | Refills: 3 | Status: SHIPPED | OUTPATIENT
Start: 2021-11-18 | End: 2022-05-02

## 2021-11-18 RX ORDER — PREDNISONE 1 MG/1
5 TABLET ORAL DAILY
COMMUNITY
End: 2021-12-20

## 2021-11-18 ASSESSMENT — ENCOUNTER SYMPTOMS
SHORTNESS OF BREATH: 1
COUGH: 1

## 2021-11-18 NOTE — ASSESSMENT & PLAN NOTE
Multifactorial probably secondary toPulmonary and ischemic, mitral regurgitation will repeat echo in 6 months

## 2021-11-18 NOTE — PROGRESS NOTES
CARDIOLOGY  NOTE  Chief Complaint: Chest Pain    HPI:   Annabel Cochran is a 70y.o. year old who has history as noted below. She used to see Dr Jamie Hoffman comes in for follow up for CAD. she is on 2.5 Litres of O2 ATC , she says she had covid 4 weeks ago and is snot not better. She says she gest chest pain when she is anxious or over worked . She says the chest pain is pressure-like and goes to her left arm and jaw. He's she rates it as 5 out of 10. She has been coughing. Current Outpatient Medications   Medication Sig Dispense Refill    predniSONE (DELTASONE) 5 MG tablet Take 5 mg by mouth daily      lisinopril (PRINIVIL;ZESTRIL) 20 MG tablet Take 1 tablet by mouth daily 45 tablet 3    triamcinolone (KENALOG) 0.1 % ointment Apply topically 2 times daily for 7 days 15 g 1    loratadine (CLARITIN) 10 MG tablet Take 1 tablet by mouth daily 15 tablet 0    escitalopram (LEXAPRO) 10 MG tablet Take 1 tablet by mouth daily 30 tablet 0    hydrOXYzine (ATARAX) 10 MG tablet Take 1 tablet by mouth every 8 hours as needed for Itching 30 tablet 0    carvedilol (COREG) 12.5 MG tablet TAKE 1 TABLET BY MOUTH TWICE DAILY WITH MEALS 60 tablet 1    isosorbide mononitrate (IMDUR) 60 MG extended release tablet Take 0.5 tablets by mouth daily 30 tablet 2    ibandronate (BONIVA) 150 MG tablet Take 1 tablet by mouth every 30 days Take one (1) tablet once per month in the morning with a full glass of water, on an empty stomach, and do not take anything else by mouth or lie down for the next 30 minutes.  1 tablet 11    busPIRone (BUSPAR) 5 MG tablet Take 1 tablet by mouth 2 times daily 60 tablet 5    montelukast (SINGULAIR) 10 MG tablet Take 1 tablet by mouth daily 30 tablet 5    doxepin (SINEQUAN) 10 MG capsule Take 1 capsule by mouth nightly 30 capsule 5    atorvastatin (LIPITOR) 40 MG tablet Take 1 tablet by mouth daily 90 tablet 3    Arformoterol Tartrate (BROVANA) 15 MCG/2ML NEBU Take 1 ampule by nebulization 2 times daily 120 mL 5    budesonide (PULMICORT) 0.5 MG/2ML nebulizer suspension Take 2 mLs by nebulization 2 times daily 30 ampule 5    nitroGLYCERIN (NITROSTAT) 0.4 MG SL tablet Place 1 tablet under the tongue every 5 minutes as needed for Chest pain 25 tablet 3    albuterol sulfate  (90 Base) MCG/ACT inhaler Inhale 2 puffs into the lungs every 6 hours as needed for Wheezing      Melatonin 10 MG CAPS Take 10 mg by mouth nightly as needed       ipratropium-albuterol (DUONEB) 0.5-2.5 (3) MG/3ML SOLN nebulizer solution Take 3 mLs by nebulization 4 times daily 360 mL 0    docusate sodium (COLACE) 100 MG capsule Take 100 mg by mouth daily as needed       aspirin 81 MG chewable tablet Take 1 tablet by mouth daily 30 tablet 3    zoster recombinant adjuvanted vaccine (SHINGRIX) 50 MCG/0.5ML SUSR injection Inject 0.5 mLs into the muscle See Admin Instructions 1 dose now and repeat in 2-6 months (Patient not taking: Reported on 11/17/2021) 0.5 mL 0     No current facility-administered medications for this visit. Allergies:   Seasonal    Patient History:  Past Medical History:   Diagnosis Date    Acute on chronic respiratory failure with hypoxia ([facility] [address] 7/19/2021    Arthritis     Asthma     CAD (coronary artery disease)     [facility])     H/O Doppler ultrasound (Bilateral Carotid) 04/26/2018    No hemodynamically significant stenosis noted in the internal carotid artery bilaterally.  H/O echocardiogram 03/07/2017; 12/8/2020    Left ventricular systolic function is moderately depressed with EF 35-40%. Mild-Moderate Pulmonary HTN.  History of nuclear stress test 03/07/2017; 11/3/2020    LVEF is low, EF 40%. Normal perfusion study.     Hyperlipidemia     Hypertension     Mitral regurgitation     Need for prophylactic vaccination against Streptococcus pneumoniae (pneumococcus) 6/7/2021    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    Lima to LAD & Diag, SVG to Post lat RCA & Ramus     Past Surgical History:   Procedure Laterality Date    APPENDECTOMY      CARDIAC SURGERY      HYSTERECTOMY      INTRACAPSULAR CATARACT EXTRACTION Left 2019    EYE CATARACT EMULSIFICATION IOL IMPLANT performed by Shefali Yeboah MD at [facility]      SINUS SURGERY      TONSILLECTOMY       Family History   Problem Relation Age of Onset    Other Mother         copd, emphysema    Colon Cancer Sister     Other Brother         copd, agent orange    Other Brother         agent orange     Social History     Tobacco Use    Smoking status: Former Smoker     Packs/day: 1.00     Years: 40.00     Pack years: 40.00     Types: Cigarettes     Quit date: 5/15/2015     Years since quittin.5    Smokeless tobacco: Never Used   Substance Use Topics    Alcohol use: No     Alcohol/week: 0.0 standard drinks        Review of Systems:   · Constitutional: No Fever or Weight Loss   · Eyes: No Decreased Vision  · ENT: No Headaches, Hearing Loss or Vertigo  · Cardiovascular: as per note above   · Respiratory: No cough or wheezing and as per note above.    · Gastrointestinal: No abdominal pain, appetite loss, blood in stools, constipation, diarrhea or heartburn  · Genitourinary: No dysuria, trouble voiding, or hematuria  · Musculoskeletal:  None  · Integumentary: No rash or pruritis  · Neurological: No TIA or stroke symptoms  · Psychiatric: No anxiety or depression  · Endocrine: No malaise, fatigue or temperature intolerance  · Hematologic/Lymphatic: No bleeding problems, blood clots or swollen lymph nodes  · Allergic/Immunologic: No nasal congestion or hives    Objective:      Physical Exam:  BP (!) 164/90 (Site: Left Upper Arm, Position: Sitting, Cuff Size: Medium Adult)   Pulse 76   Ht 5' 5\" (1.651 m)   Wt 120 lb (54.4 kg)   LMP  (LMP Unknown)   BMI 19.97 kg/m²   Wt Readings from Last 3 Encounters:   21 120 lb (54.4 kg)   21 122 lb (55.3 kg)   10/11/21 120 lb (54.4 kg)     Body mass index is 19.97 kg/m². Vitals:    11/18/21 1456   BP: (!) 164/90   Pulse: 76        General Appearance:  She is in  distress, conversant, appears anxious and appears in tears  Constitutional:  Well developed, Well nourished, No acute distress, Non-toxic appearance. HENT:  Normocephalic, Atraumatic, Bilateral external ears normal, Oropharynx moist, No oral exudates, Nose normal. Neck- Normal range of motion, No tenderness, Supple, No stridor,no apical-carotid delay  Eyes:  PERRL, EOMI, Conjunctiva normal, No discharge. Respiratory:  Normal breath sounds, No respiratory distress, No wheezing, No chest tenderness. ,no use of accessory muscles,   Cardiovascular: (PMI) apex non displaced,no lifts no thrills,S1 and S2 audible, No added heart sounds, No signs of ankle edema, or volume overload, No evidence of JVD  GI:  Bowel sounds normal, Soft, No tenderness, No masses, No gross visceromegaly   :  No costovertebral angle tenderness   Musculoskeletal:  No edema, no tenderness, no deformities.  Back- no tenderness  Integument:  Well hydrated, no rash   Lymphatic:  No lymphadenopathy noted   Neurologic:  Alert & oriented x 3, CN 2-12 normal, normal motor function, normal sensory function, no focal deficits noted   Psychiatric:  Speech and behavior appropriate       Medical decision making and Data review:  DATA:  Lab Results   Component Value Date    TROPONINT <0.010 10/11/2021     BNP:    Lab Results   Component Value Date    PROBNP 763 (H) 08/05/2021     PT/INR:  No results found for: PTINR  Lab Results   Component Value Date    LABA1C 5.0 10/05/2021    LABA1C 6.0 05/16/2015     Lab Results   Component Value Date    CHOL 147 06/04/2021    TRIG 72 06/04/2021    HDL 37 (L) 06/04/2021    LDLCALC 96 06/04/2021    LDLDIRECT 98 04/13/2018     Lab Results   Component Value Date    ALT 15 08/05/2021    AST 12 (L) 08/05/2021     Echo 12/8/20  Summary   Left ventricular systolic function is secondary to Covid and chest pain which gets worse with inspiration she is on 2.5 L of oxygen use nitro as needed stress test.  In November 2020 showed no ischemia    Chronic obstructive pulmonary disease (HCC)  0.5 L around-the-clock    Mitral regurgitation  Continue to monitor repeat echo in 6 months significant thickening of mitral leaflets with moderate regurgitation reduced EF    Ischemic cardiomyopathy  Ejection fraction 35 to 40%, is euvolemic continue Coreg 12.5 mg , increase lisinopril 20 mg daily    HTN (hypertension)  Increase lisinopril 20 mg daily    Pulmonary HTN (HCC)  Multifactorial probably secondary toPulmonary and ischemic, mitral regurgitation will repeat echo in 6 months     Dyslipidemia :  Angy Ryan is on lipitor 40 mg daily    Counseled extensively and medication compliance urged. Continue current medications. Appropriate prescriptions are addressed and refills ordered. Questions answered and patient verbalizes understanding. Call for any problems, questions, or concerns. Continue all other medications of all above medical condition listed as is. Return in about 6 months (around 5/18/2022).

## 2021-11-18 NOTE — ASSESSMENT & PLAN NOTE
Continue to monitor repeat echo in 6 months significant thickening of mitral leaflets with moderate regurgitation reduced EF

## 2021-12-08 RX ORDER — CARVEDILOL 12.5 MG/1
TABLET ORAL
Qty: 60 TABLET | Refills: 5 | Status: SHIPPED | OUTPATIENT
Start: 2021-12-08 | End: 2022-06-20 | Stop reason: SDUPTHER

## 2021-12-20 RX ORDER — PREDNISONE 1 MG/1
TABLET ORAL
Qty: 30 TABLET | Refills: 0 | Status: SHIPPED | OUTPATIENT
Start: 2021-12-20 | End: 2022-03-24 | Stop reason: SDUPTHER

## 2022-02-03 ENCOUNTER — TELEPHONE (OUTPATIENT)
Dept: PULMONOLOGY | Age: 72
End: 2022-02-03

## 2022-02-07 NOTE — TELEPHONE ENCOUNTER
Spoke with spouse letting him know that the scheduling was most likely from Ely's CT order from last February and gave number to central scheduling to call and have that done.

## 2022-03-01 ENCOUNTER — TELEPHONE (OUTPATIENT)
Dept: PULMONOLOGY | Age: 72
End: 2022-03-01

## 2022-03-01 NOTE — TELEPHONE ENCOUNTER
Patients  called and stated he needed his wife an appointment before March 8. I advised patient Rosamaria Banuelos is no longer with our office. I asked if he had tried  to contact oxygen  Company where she gets her oxygen he stated he hadn't I gave him the number to [facility].

## 2022-03-24 ENCOUNTER — OFFICE VISIT (OUTPATIENT)
Dept: FAMILY MEDICINE CLINIC | Age: 72
End: 2022-03-24
Payer: MEDICARE

## 2022-03-24 DIAGNOSIS — R82.90 URINE ABNORMALITY: ICD-10-CM

## 2022-03-24 DIAGNOSIS — E87.1 HYPONATREMIA: ICD-10-CM

## 2022-03-24 DIAGNOSIS — J44.1 CHRONIC OBSTRUCTIVE PULMONARY DISEASE WITH ACUTE EXACERBATION (HCC): ICD-10-CM

## 2022-03-24 DIAGNOSIS — R53.83 OTHER FATIGUE: Primary | ICD-10-CM

## 2022-03-24 DIAGNOSIS — I27.20 PULMONARY HTN (HCC): Chronic | ICD-10-CM

## 2022-03-24 DIAGNOSIS — I51.89 DIASTOLIC DYSFUNCTION WITHOUT HEART FAILURE: Chronic | ICD-10-CM

## 2022-03-24 LAB
A/G RATIO: 1.9 (ref 1.1–2.2)
ALBUMIN SERPL-MCNC: 4.1 G/DL (ref 3.4–5)
ALP BLD-CCNC: 37 U/L (ref 40–129)
ALT SERPL-CCNC: 11 U/L (ref 10–40)
ANION GAP SERPL CALCULATED.3IONS-SCNC: 12 MMOL/L (ref 3–16)
AST SERPL-CCNC: 15 U/L (ref 15–37)
BASOPHILS ABSOLUTE: 0.1 K/UL (ref 0–0.2)
BASOPHILS RELATIVE PERCENT: 1.4 %
BILIRUB SERPL-MCNC: 0.5 MG/DL (ref 0–1)
BILIRUBIN, POC: ABNORMAL
BLOOD URINE, POC: ABNORMAL
BUN BLDV-MCNC: 8 MG/DL (ref 7–20)
CALCIUM SERPL-MCNC: 9.3 MG/DL (ref 8.3–10.6)
CHLORIDE BLD-SCNC: 95 MMOL/L (ref 99–110)
CLARITY, POC: CLEAR
CO2: 25 MMOL/L (ref 21–32)
COLOR, POC: YELLOW
CREAT SERPL-MCNC: <0.5 MG/DL (ref 0.6–1.2)
EOSINOPHILS ABSOLUTE: 0.9 K/UL (ref 0–0.6)
EOSINOPHILS RELATIVE PERCENT: 12.3 %
GFR AFRICAN AMERICAN: >60
GFR NON-AFRICAN AMERICAN: >60
GLUCOSE BLD-MCNC: 100 MG/DL (ref 70–99)
GLUCOSE URINE, POC: ABNORMAL
HCT VFR BLD CALC: 40 % (ref 36–48)
HEMOGLOBIN: 13.5 G/DL (ref 12–16)
KETONES, POC: ABNORMAL
LEUKOCYTE EST, POC: ABNORMAL
LYMPHOCYTES ABSOLUTE: 2.1 K/UL (ref 1–5.1)
LYMPHOCYTES RELATIVE PERCENT: 27.2 %
MCH RBC QN AUTO: 31.2 PG (ref 26–34)
MCHC RBC AUTO-ENTMCNC: 33.8 G/DL (ref 31–36)
MCV RBC AUTO: 92.3 FL (ref 80–100)
MONOCYTES ABSOLUTE: 0.7 K/UL (ref 0–1.3)
MONOCYTES RELATIVE PERCENT: 9.3 %
NEUTROPHILS ABSOLUTE: 3.8 K/UL (ref 1.7–7.7)
NEUTROPHILS RELATIVE PERCENT: 49.8 %
NITRITE, POC: ABNORMAL
PDW BLD-RTO: 13.3 % (ref 12.4–15.4)
PH, POC: 7
PLATELET # BLD: 351 K/UL (ref 135–450)
PMV BLD AUTO: 7.8 FL (ref 5–10.5)
POTASSIUM SERPL-SCNC: 4.6 MMOL/L (ref 3.5–5.1)
PROTEIN, POC: ABNORMAL
RBC # BLD: 4.34 M/UL (ref 4–5.2)
SODIUM BLD-SCNC: 132 MMOL/L (ref 136–145)
SPECIFIC GRAVITY, POC: 1.02
TOTAL PROTEIN: 6.3 G/DL (ref 6.4–8.2)
TSH REFLEX FT4: 0.85 UIU/ML (ref 0.27–4.2)
UROBILINOGEN, POC: 0.2
WBC # BLD: 7.7 K/UL (ref 4–11)

## 2022-03-24 PROCEDURE — 36415 COLL VENOUS BLD VENIPUNCTURE: CPT | Performed by: PHYSICIAN ASSISTANT

## 2022-03-24 PROCEDURE — 1090F PRES/ABSN URINE INCON ASSESS: CPT | Performed by: PHYSICIAN ASSISTANT

## 2022-03-24 PROCEDURE — G8399 PT W/DXA RESULTS DOCUMENT: HCPCS | Performed by: PHYSICIAN ASSISTANT

## 2022-03-24 PROCEDURE — G8427 DOCREV CUR MEDS BY ELIG CLIN: HCPCS | Performed by: PHYSICIAN ASSISTANT

## 2022-03-24 PROCEDURE — 99214 OFFICE O/P EST MOD 30 MIN: CPT | Performed by: PHYSICIAN ASSISTANT

## 2022-03-24 PROCEDURE — G8420 CALC BMI NORM PARAMETERS: HCPCS | Performed by: PHYSICIAN ASSISTANT

## 2022-03-24 PROCEDURE — 81002 URINALYSIS NONAUTO W/O SCOPE: CPT | Performed by: PHYSICIAN ASSISTANT

## 2022-03-24 PROCEDURE — 4040F PNEUMOC VAC/ADMIN/RCVD: CPT | Performed by: PHYSICIAN ASSISTANT

## 2022-03-24 PROCEDURE — 3023F SPIROM DOC REV: CPT | Performed by: PHYSICIAN ASSISTANT

## 2022-03-24 PROCEDURE — 1036F TOBACCO NON-USER: CPT | Performed by: PHYSICIAN ASSISTANT

## 2022-03-24 PROCEDURE — 3017F COLORECTAL CA SCREEN DOC REV: CPT | Performed by: PHYSICIAN ASSISTANT

## 2022-03-24 PROCEDURE — G8482 FLU IMMUNIZE ORDER/ADMIN: HCPCS | Performed by: PHYSICIAN ASSISTANT

## 2022-03-24 PROCEDURE — 1123F ACP DISCUSS/DSCN MKR DOCD: CPT | Performed by: PHYSICIAN ASSISTANT

## 2022-03-24 RX ORDER — ESCITALOPRAM OXALATE 10 MG/1
10 TABLET ORAL DAILY
Qty: 30 TABLET | Refills: 0 | Status: CANCELLED | OUTPATIENT
Start: 2022-03-24

## 2022-03-24 RX ORDER — PREDNISONE 1 MG/1
TABLET ORAL
Qty: 30 TABLET | Refills: 5 | Status: SHIPPED | OUTPATIENT
Start: 2022-03-24 | End: 2022-07-12 | Stop reason: SDUPTHER

## 2022-03-24 ASSESSMENT — PATIENT HEALTH QUESTIONNAIRE - PHQ9
2. FEELING DOWN, DEPRESSED OR HOPELESS: 1
1. LITTLE INTEREST OR PLEASURE IN DOING THINGS: 1
SUM OF ALL RESPONSES TO PHQ QUESTIONS 1-9: 2
SUM OF ALL RESPONSES TO PHQ9 QUESTIONS 1 & 2: 2
SUM OF ALL RESPONSES TO PHQ QUESTIONS 1-9: 2

## 2022-03-24 NOTE — PROGRESS NOTES
Kiel Licona  1950  67 y.o.  female    SUBJECTIVE:    Chief Complaint   Patient presents with    Abdominal Cramping     C/o lower abd cramping and low back x 2 days. Thinks that she has a UTI. Denies any other symptoms    Fatigue       HPI   Pt here today for routine f/u. COPD-severe, on continuous oxygen. Has not been taking daily low dose prednisone because she states it has made her gain weight and the extra weight causes UNGER. She is actually down 6# from previous visit 4 months ago. She was following with Funmi Bullock NP for pulmonology but she has since left this organization . Pt would like referral to new pulmonologist at this time. O2 sat on arrival 87% but did go up to 95% after resting quietly with O2 on 2.5 L.     CAD/pulmonary HTN/diastolic dysfunction-following with cardiology. States she is not taking the 1/2 tab Imdur 60 mg at this time. States she does not want to take so much medication as she is concerned it will cause wt gain. She does c/o increased fatigue today. Ongoing for several weeks. Lower abd cramping-x 2 days, suprapubic area. Worse after urination.  Feels similar to previous UTIs    PHQ Scores 3/24/2022 11/17/2021 10/5/2021 8/5/2021 6/4/2021 1/5/2021   PHQ2 Score 2 0 0 0 2 0   PHQ9 Score 2 0 0 0 2 0     Interpretation of Total Score Depression Severity: 1-4 = Minimal depression, 5-9 = Mild depression, 10-14 = Moderate depression, 15-19 = Moderately severe depression, 20-27 = Severe depression     Current Outpatient Medications on File Prior to Visit   Medication Sig Dispense Refill    montelukast (SINGULAIR) 10 MG tablet Take 1 tablet by mouth once daily 30 tablet 11    busPIRone (BUSPAR) 5 MG tablet Take 1 tablet by mouth twice daily 60 tablet 5    doxepin (SINEQUAN) 10 MG capsule TAKE 1 CAPSULE BY MOUTH NIGHTLY 30 capsule 5    carvedilol (COREG) 12.5 MG tablet TAKE 1 TABLET BY MOUTH TWICE DAILY WITH MEALS 60 tablet 5    lisinopril (PRINIVIL;ZESTRIL) 20 MG tablet Take 1 tablet by mouth daily 45 tablet 3    isosorbide mononitrate (IMDUR) 60 MG extended release tablet Take 0.5 tablets by mouth daily 30 tablet 2    ibandronate (BONIVA) 150 MG tablet Take 1 tablet by mouth every 30 days Take one (1) tablet once per month in the morning with a full glass of water, on an empty stomach, and do not take anything else by mouth or lie down for the next 30 minutes. 1 tablet 11    atorvastatin (LIPITOR) 40 MG tablet Take 1 tablet by mouth daily 90 tablet 3    Arformoterol Tartrate (BROVANA) 15 MCG/2ML NEBU Take 1 ampule by nebulization 2 times daily 120 mL 5    budesonide (PULMICORT) 0.5 MG/2ML nebulizer suspension Take 2 mLs by nebulization 2 times daily 30 ampule 5    nitroGLYCERIN (NITROSTAT) 0.4 MG SL tablet Place 1 tablet under the tongue every 5 minutes as needed for Chest pain 25 tablet 3    albuterol sulfate  (90 Base) MCG/ACT inhaler Inhale 2 puffs into the lungs every 6 hours as needed for Wheezing      ipratropium-albuterol (DUONEB) 0.5-2.5 (3) MG/3ML SOLN nebulizer solution Take 3 mLs by nebulization 4 times daily 360 mL 0    docusate sodium (COLACE) 100 MG capsule Take 100 mg by mouth daily as needed       aspirin 81 MG chewable tablet Take 1 tablet by mouth daily 30 tablet 3     No current facility-administered medications on file prior to visit. Allergies   Allergen Reactions    Seasonal Itching       Past Medical History:   Diagnosis Date    [facility] on chronic respiratory failure with hypoxia ([facility] [address] 7/19/2021    Arthritis     Asthma     CAD (coronary artery disease)     [facility]     H/O Doppler ultrasound (Bilateral Carotid) 04/26/2018    No hemodynamically significant stenosis noted in the internal carotid artery bilaterally.  H/O echocardiogram 03/07/2017; 12/8/2020    Left ventricular systolic function is moderately depressed with EF 35-40%. Mild-Moderate Pulmonary HTN.     History of nuclear stress test 2017; 11/3/2020    LVEF is low, EF 40%. Normal perfusion study.  Hyperlipidemia     Hypertension     Mitral regurgitation     Need for prophylactic vaccination against Streptococcus pneumoniae (pneumococcus) 2021    S/P CABG x 4 2015    [place]ima to LAD & Diag, SVG to Post lat RCA & Ramus       Past Surgical History:   Procedure Laterality Date    APPENDECTOMY      CARDIAC SURGERY      HYSTERECTOMY      INTRACAPSULAR CATARACT EXTRACTION Left 2019    EYE CATARACT EMULSIFICATION IOL IMPLANT performed by Tori Sims MD at [address] [place] [address]      TONSILLECTOMY         Social History     Socioeconomic History    Marital status:      Spouse name: None    Number of children: None    Years of education: None    Highest education level: None   Occupational History    None   Tobacco Use    Smoking status: Former Smoker     Packs/day: 1.00     Years: 40.00     Pack years: 40.00     Types: Cigarettes     Quit date: 5/15/2015     Years since quittin.8    Smokeless tobacco: Never Used   Vaping Use    Vaping Use: Never used   Substance and Sexual Activity    Alcohol use: No     Alcohol/week: 0.0 standard drinks    Drug use: No    Sexual activity: Yes     Partners: Male   Other Topics Concern    None   Social History Narrative    ** Merged History Encounter **          Social Determinants of Health     Financial Resource Strain:     Difficulty of Paying Living Expenses: Not on file   Food Insecurity:     Worried About Running Out of Food in the Last Year: Not on file    Simona of Food in the Last Year: Not on file   Transportation Needs:     Lack of Transportation (Medical): Not on file    Lack of Transportation (Non-Medical):  Not on file   Physical Activity:     Days of Exercise per Week: Not on file    Minutes of Exercise per Session: Not on file   Stress:     Feeling of Stress : Not on file   Social Connections:     Frequency of Communication with Friends and Family: Not on file    Frequency of Social Gatherings with Friends and Family: Not on file    Attends [employer] Services: Not on file    Active Member of Clubs or Organizations: Not on file    Attends Club or Organization Meetings: Not on file    Marital Status: Not on file   Intimate Partner Violence:     Fear of Current or Ex-Partner: Not on file    Emotionally Abused: Not on file    Physically Abused: Not on file    Sexually Abused: Not on file   Housing Stability:     Unable to Pay for Housing in the Last Year: Not on file    Number of [place] in the Last Year: Not on file    Unstable Housing in the Last Year: Not on file       Review of Systems   Constitutional: Negative for chills and fever. HENT: Negative. Respiratory: Positive for cough, chest tightness, shortness of breath and wheezing. Cardiovascular: Negative for chest pain, palpitations and leg swelling. Gastrointestinal: Negative for abdominal pain. Endocrine: Negative. Genitourinary: Positive for pelvic pain and urgency. Negative for flank pain, frequency and hematuria. Cramping suprapubic area   Musculoskeletal: Positive for arthralgias and back pain. Skin: Negative. Neurological: Negative. Psychiatric/Behavioral: Negative. OBJECTIVE:    /60 (Site: Left Upper Arm, Position: Sitting, Cuff Size: Medium Adult)   Pulse 83   Temp 98 °F (36.7 °C)   Resp 22   Wt 114 lb 12.8 oz (52.1 kg)   LMP  (LMP Unknown)   SpO2 95% Comment: on 3 L of O2 per NC  BMI 19.10 kg/m²     Physical Exam  Vitals reviewed. Constitutional:       General: She is not in acute distress. Appearance: Normal appearance. HENT:      Head: Normocephalic. Right Ear: External ear normal.      Left Ear: External ear normal.   Cardiovascular:      Rate and Rhythm: Normal rate and regular rhythm. Heart sounds: Normal heart sounds. Pulmonary:      Effort: No respiratory distress. if worse. Continue all current meds. Pt reluctant to change treatment plan at this time. Necessary to remain on continuous oxygen due to severity of COPD and desaturation with exertion even with O2 in place         Relevant Medications    ipratropium-albuterol (DUONEB) 0.5-2.5 (3) MG/3ML SOLN nebulizer solution    albuterol sulfate  (90 Base) MCG/ACT inhaler    Arformoterol Tartrate (BROVANA) 15 MCG/2ML NEBU    budesonide (PULMICORT) 0.5 MG/2ML nebulizer suspension    montelukast (SINGULAIR) 10 MG tablet    predniSONE (DELTASONE) 5 MG tablet       Other    Urine abnormality    Relevant Orders    POCT Urinalysis no Micro (Completed)    Culture, Urine (Completed)    Other fatigue - Primary    Relevant Orders    Comprehensive Metabolic Panel (Completed)    CBC with Auto Differential (Completed)    TSH with Reflex to FT4 (Completed)    Chronic Hyponatremia     Check labs now                     Return in about 3 months (around 6/24/2022).

## 2022-03-25 VITALS
RESPIRATION RATE: 22 BRPM | SYSTOLIC BLOOD PRESSURE: 118 MMHG | TEMPERATURE: 98 F | WEIGHT: 114.8 LBS | OXYGEN SATURATION: 95 % | DIASTOLIC BLOOD PRESSURE: 60 MMHG | BODY MASS INDEX: 19.1 KG/M2 | HEART RATE: 83 BPM

## 2022-03-25 DIAGNOSIS — J44.9 COPD, SEVERE (HCC): Primary | ICD-10-CM

## 2022-03-25 DIAGNOSIS — E87.1 HYPONATREMIA: Primary | ICD-10-CM

## 2022-03-25 PROBLEM — R82.90 URINE ABNORMALITY: Status: ACTIVE | Noted: 2022-03-25

## 2022-03-25 PROBLEM — R53.83 OTHER FATIGUE: Status: ACTIVE | Noted: 2022-03-25

## 2022-03-25 LAB
ORGANISM: ABNORMAL
URINE CULTURE, ROUTINE: ABNORMAL

## 2022-03-25 ASSESSMENT — ENCOUNTER SYMPTOMS
BACK PAIN: 1
WHEEZING: 1
COUGH: 1
SHORTNESS OF BREATH: 1
ABDOMINAL PAIN: 0
CHEST TIGHTNESS: 1

## 2022-03-25 NOTE — ASSESSMENT & PLAN NOTE
Prednisone 5 mg 4 per day for next 5 days, refer to Dr. Susan Weller, recommend resuming 5 mg daily dose of steroid once burst is finished. ER if worse. Continue all current meds. Pt reluctant to change treatment plan at this time.  Necessary to remain on continuous oxygen due to severity of COPD and desaturation with exertion even with O2 in place

## 2022-03-25 NOTE — ASSESSMENT & PLAN NOTE
Did review importance of medication compliance with pt today. Most recent echo from 12/2022:   Left ventricular systolic function is moderately depressed with an ejection   fraction of 35 to 40 %. Anterior wall hypokinesis & Septal wall hypokinesis noted. Mild septal wall asymmetrical left ventricular hypertrophy. The left atrium is mildly dilated. Mild thickening of anterior/posterior leaflets of mitral valve. Right ventricular systolic pressure of 46 mmHg consistent with mild to   moderate pulmonary hypertension.    No evidence of pericardial effusion   Monitored by specialist- no acute findings meriting change in the plan

## 2022-03-28 RX ORDER — NITROFURANTOIN 25; 75 MG/1; MG/1
100 CAPSULE ORAL 2 TIMES DAILY
Qty: 14 CAPSULE | Refills: 0 | Status: SHIPPED | OUTPATIENT
Start: 2022-03-28 | End: 2022-04-04

## 2022-04-25 ENCOUNTER — HOSPITAL ENCOUNTER (OUTPATIENT)
Age: 72
Discharge: HOME | End: 2022-04-25
Payer: MEDICARE

## 2022-04-25 ENCOUNTER — HOSPITAL ENCOUNTER (OUTPATIENT)
Dept: GENERAL RADIOLOGY | Age: 72
Discharge: HOME | End: 2022-04-25
Payer: MEDICARE

## 2022-04-25 DIAGNOSIS — J96.11 CHRONIC RESPIRATORY FAILURE WITH HYPOXIA (HCC): ICD-10-CM

## 2022-04-25 DIAGNOSIS — J43.2 CENTRILOBULAR EMPHYSEMA (HCC): ICD-10-CM

## 2022-04-25 LAB
ALBUMIN SERPL-MCNC: 3.7 GM/DL (ref 3.4–5)
ALP BLD-CCNC: 37 IU/L (ref 40–129)
ALT SERPL-CCNC: 6 U/L (ref 10–40)
ANION GAP SERPL CALCULATED.3IONS-SCNC: 8 MMOL/L (ref 4–16)
AST SERPL-CCNC: 11 IU/L (ref 15–37)
BASOPHILS ABSOLUTE: 0.2 K/CU MM
BASOPHILS RELATIVE PERCENT: 1.5 % (ref 0–1)
BILIRUB SERPL-MCNC: 0.4 MG/DL (ref 0–1)
BUN BLDV-MCNC: 9 MG/DL (ref 6–23)
CALCIUM SERPL-MCNC: 9 MG/DL (ref 8.3–10.6)
CHLORIDE BLD-SCNC: 97 MMOL/L (ref 99–110)
CO2: 26 MMOL/L (ref 21–32)
CREAT SERPL-MCNC: 0.5 MG/DL (ref 0.6–1.1)
DIFFERENTIAL TYPE: ABNORMAL
EOSINOPHILS ABSOLUTE: 1 K/CU MM
EOSINOPHILS RELATIVE PERCENT: 9.5 % (ref 0–3)
GFR AFRICAN AMERICAN: >60 ML/MIN/1.73M2
GFR NON-AFRICAN AMERICAN: >60 ML/MIN/1.73M2
GLUCOSE FASTING: 96 MG/DL (ref 70–99)
HCT VFR BLD CALC: 37.2 % (ref 37–47)
HEMOGLOBIN: 12.1 GM/DL (ref 12.5–16)
IMMATURE NEUTROPHIL %: 0.3 % (ref 0–0.43)
LYMPHOCYTES ABSOLUTE: 2.7 K/CU MM
LYMPHOCYTES RELATIVE PERCENT: 25.3 % (ref 24–44)
MCH RBC QN AUTO: 31.2 PG (ref 27–31)
MCHC RBC AUTO-ENTMCNC: 32.5 % (ref 32–36)
MCV RBC AUTO: 95.9 FL (ref 78–100)
MONOCYTES ABSOLUTE: 1.1 K/CU MM
MONOCYTES RELATIVE PERCENT: 10.5 % (ref 0–4)
PDW BLD-RTO: 12.7 % (ref 11.7–14.9)
PLATELET # BLD: 345 K/CU MM (ref 140–440)
PMV BLD AUTO: 9.3 FL (ref 7.5–11.1)
POTASSIUM SERPL-SCNC: 4.6 MMOL/L (ref 3.5–5.1)
RBC # BLD: 3.88 M/CU MM (ref 4.2–5.4)
SEGMENTED NEUTROPHILS ABSOLUTE COUNT: 5.7 K/CU MM
SEGMENTED NEUTROPHILS RELATIVE PERCENT: 52.9 % (ref 36–66)
SODIUM BLD-SCNC: 131 MMOL/L (ref 135–145)
TOTAL IMMATURE NEUTOROPHIL: 0.03 K/CU MM
TOTAL PROTEIN: 6.3 GM/DL (ref 6.4–8.2)
WBC # BLD: 10.7 K/CU MM (ref 4–10.5)

## 2022-04-25 PROCEDURE — 36415 COLL VENOUS BLD VENIPUNCTURE: CPT

## 2022-04-25 PROCEDURE — 85025 COMPLETE CBC W/AUTO DIFF WBC: CPT

## 2022-04-25 PROCEDURE — 82785 ASSAY OF IGE: CPT

## 2022-04-25 PROCEDURE — 80053 COMPREHEN METABOLIC PANEL: CPT

## 2022-04-25 PROCEDURE — 71046 X-RAY EXAM CHEST 2 VIEWS: CPT

## 2022-04-27 LAB — IMMUNOGLOBULIN E: 31 KU/L

## 2022-05-02 RX ORDER — LISINOPRIL 20 MG/1
TABLET ORAL
Qty: 45 TABLET | Refills: 2 | Status: SHIPPED | OUTPATIENT
Start: 2022-05-02 | End: 2022-08-02 | Stop reason: SDUPTHER

## 2022-06-07 RX ORDER — CARVEDILOL 12.5 MG/1
TABLET ORAL
Qty: 60 TABLET | Refills: 0 | OUTPATIENT
Start: 2022-06-07

## 2022-06-20 RX ORDER — CARVEDILOL 12.5 MG/1
6.25 TABLET ORAL 2 TIMES DAILY WITH MEALS
Qty: 60 TABLET | Refills: 0 | Status: SHIPPED | OUTPATIENT
Start: 2022-06-20 | End: 2022-08-22

## 2022-07-07 RX ORDER — DOXEPIN HYDROCHLORIDE 10 MG/1
10 CAPSULE ORAL NIGHTLY
Qty: 30 CAPSULE | Refills: 0 | Status: SHIPPED | OUTPATIENT
Start: 2022-07-07 | End: 2022-08-03

## 2022-07-11 ENCOUNTER — TELEPHONE (OUTPATIENT)
Dept: FAMILY MEDICINE CLINIC | Age: 72
End: 2022-07-11

## 2022-07-11 NOTE — TELEPHONE ENCOUNTER
----- Message from Radha Ortiz sent at 7/11/2022  2:33 PM EDT -----  Subject: Refill Request    QUESTIONS  Name of Medication? predniSONE (DELTASONE) 5 MG tablet  Patient-reported dosage and instructions? 5mg  How many days do you have left? 0  Preferred Pharmacy? [email or web address]  Pharmacy phone number (if available)? [phone]  ---------------------------------------------------------------------------  --------------  CALL BACK INFO  What is the best way for the office to contact you? OK to leave message on   voicemail  Preferred Call Back Phone Number? [phone]  ---------------------------------------------------------------------------  --------------  SCRIPT ANSWERS  Relationship to Patient?  Self

## 2022-07-12 RX ORDER — PREDNISONE 1 MG/1
TABLET ORAL
Qty: 30 TABLET | Refills: 5 | Status: ON HOLD | OUTPATIENT
Start: 2022-07-12 | End: 2022-08-19 | Stop reason: HOSPADM

## 2022-07-12 RX ORDER — BUSPIRONE HYDROCHLORIDE 5 MG/1
TABLET ORAL
Qty: 60 TABLET | Refills: 5 | Status: SHIPPED | OUTPATIENT
Start: 2022-07-12

## 2022-07-19 RX ORDER — ISOSORBIDE MONONITRATE 60 MG/1
TABLET, EXTENDED RELEASE ORAL
Qty: 30 TABLET | Refills: 0 | Status: SHIPPED | OUTPATIENT
Start: 2022-07-19 | End: 2022-09-26

## 2022-08-02 RX ORDER — LISINOPRIL 20 MG/1
20 TABLET ORAL DAILY
Qty: 90 TABLET | Refills: 3 | Status: ON HOLD | OUTPATIENT
Start: 2022-08-02 | End: 2022-08-18

## 2022-08-03 RX ORDER — DOXEPIN HYDROCHLORIDE 10 MG/1
10 CAPSULE ORAL NIGHTLY
Qty: 30 CAPSULE | Refills: 11 | Status: SHIPPED | OUTPATIENT
Start: 2022-08-03

## 2022-08-17 ENCOUNTER — HOSPITAL ENCOUNTER (INPATIENT)
Age: 72
LOS: 2 days | Discharge: LEFT BEFORE DISCHARGE | DRG: 193 | End: 2022-08-19
Attending: EMERGENCY MEDICINE | Admitting: HOSPITALIST
Payer: MEDICARE

## 2022-08-17 ENCOUNTER — APPOINTMENT (OUTPATIENT)
Dept: GENERAL RADIOLOGY | Age: 72
DRG: 193 | End: 2022-08-17
Payer: MEDICARE

## 2022-08-17 DIAGNOSIS — J44.1 COPD EXACERBATION (HCC): ICD-10-CM

## 2022-08-17 DIAGNOSIS — J18.9 PNEUMONIA OF BOTH LOWER LOBES DUE TO INFECTIOUS ORGANISM: Primary | ICD-10-CM

## 2022-08-17 DIAGNOSIS — R09.02 HYPOXIA: ICD-10-CM

## 2022-08-17 PROBLEM — J96.21 ACUTE ON CHRONIC RESPIRATORY FAILURE WITH HYPOXIA (HCC): Status: ACTIVE | Noted: 2022-08-17

## 2022-08-17 LAB
ALBUMIN SERPL-MCNC: 3.3 GM/DL (ref 3.4–5)
ALP BLD-CCNC: 67 IU/L (ref 40–129)
ALT SERPL-CCNC: 11 U/L (ref 10–40)
ANION GAP SERPL CALCULATED.3IONS-SCNC: 12 MMOL/L (ref 4–16)
AST SERPL-CCNC: 14 IU/L (ref 15–37)
BASE EXCESS MIXED: 1.5 (ref 0–2.3)
BASE EXCESS: ABNORMAL (ref 0–2.4)
BILIRUB SERPL-MCNC: 0.4 MG/DL (ref 0–1)
BUN BLDV-MCNC: 22 MG/DL (ref 6–23)
CALCIUM SERPL-MCNC: 8.9 MG/DL (ref 8.3–10.6)
CHLORIDE BLD-SCNC: 92 MMOL/L (ref 99–110)
CO2 CONTENT: 27.4 MMOL/L (ref 19–24)
CO2: 26 MMOL/L (ref 21–32)
CREAT SERPL-MCNC: 0.4 MG/DL (ref 0.6–1.1)
DIFFERENTIAL TYPE: ABNORMAL
GFR AFRICAN AMERICAN: >60 ML/MIN/1.73M2
GFR NON-AFRICAN AMERICAN: >60 ML/MIN/1.73M2
GLUCOSE BLD-MCNC: 107 MG/DL (ref 70–99)
HCO3 VENOUS: 26.2 MMOL/L (ref 19–25)
HCT VFR BLD CALC: 37.4 % (ref 37–47)
HEMOGLOBIN: 12.9 GM/DL (ref 12.5–16)
LACTIC ACID, SEPSIS: 0.9 MMOL/L (ref 0.5–1.9)
LIPASE: 10 IU/L (ref 13–60)
LYMPHOCYTES ABSOLUTE: 4.7 K/CU MM
LYMPHOCYTES RELATIVE PERCENT: 28 % (ref 24–44)
MCH RBC QN AUTO: 31.4 PG (ref 27–31)
MCHC RBC AUTO-ENTMCNC: 34.5 % (ref 32–36)
MCV RBC AUTO: 91 FL (ref 78–100)
MONOCYTES ABSOLUTE: 2 K/CU MM
MONOCYTES RELATIVE PERCENT: 12 % (ref 0–4)
O2 SAT, VEN: 54.2 % (ref 50–70)
PCO2, VEN: 40.5 MMHG (ref 38–52)
PDW BLD-RTO: 11.8 % (ref 11.7–14.9)
PH VENOUS: 7.42 (ref 7.32–7.42)
PLATELET # BLD: 510 K/CU MM (ref 140–440)
PLT MORPHOLOGY: ABNORMAL
PMV BLD AUTO: 8.9 FL (ref 7.5–11.1)
PO2, VEN: 28.1 MMHG (ref 28–48)
POTASSIUM SERPL-SCNC: 4 MMOL/L (ref 3.5–5.1)
PRO-BNP: 419.1 PG/ML
RBC # BLD: 4.11 M/CU MM (ref 4.2–5.4)
RBC # BLD: ABNORMAL 10*6/UL
SARS-COV-2, NAAT: NOT DETECTED
SEGMENTED NEUTROPHILS ABSOLUTE COUNT: 10.2 K/CU MM
SEGMENTED NEUTROPHILS RELATIVE PERCENT: 60 % (ref 36–66)
SODIUM BLD-SCNC: 130 MMOL/L (ref 135–145)
SOURCE, BLOOD GAS: ABNORMAL
TOTAL PROTEIN: 7.1 GM/DL (ref 6.4–8.2)
TROPONIN T: <0.01 NG/ML
WBC # BLD: 16.9 K/CU MM (ref 4–10.5)

## 2022-08-17 PROCEDURE — 87040 BLOOD CULTURE FOR BACTERIA: CPT

## 2022-08-17 PROCEDURE — 84484 ASSAY OF TROPONIN QUANT: CPT

## 2022-08-17 PROCEDURE — 71045 X-RAY EXAM CHEST 1 VIEW: CPT

## 2022-08-17 PROCEDURE — 2580000003 HC RX 258: Performed by: EMERGENCY MEDICINE

## 2022-08-17 PROCEDURE — 6360000002 HC RX W HCPCS: Performed by: EMERGENCY MEDICINE

## 2022-08-17 PROCEDURE — 83880 ASSAY OF NATRIURETIC PEPTIDE: CPT

## 2022-08-17 PROCEDURE — 86141 C-REACTIVE PROTEIN HS: CPT

## 2022-08-17 PROCEDURE — 96375 TX/PRO/DX INJ NEW DRUG ADDON: CPT

## 2022-08-17 PROCEDURE — 94640 AIRWAY INHALATION TREATMENT: CPT

## 2022-08-17 PROCEDURE — 80053 COMPREHEN METABOLIC PANEL: CPT

## 2022-08-17 PROCEDURE — 83690 ASSAY OF LIPASE: CPT

## 2022-08-17 PROCEDURE — 94664 DEMO&/EVAL PT USE INHALER: CPT

## 2022-08-17 PROCEDURE — 85027 COMPLETE CBC AUTOMATED: CPT

## 2022-08-17 PROCEDURE — 6370000000 HC RX 637 (ALT 250 FOR IP): Performed by: EMERGENCY MEDICINE

## 2022-08-17 PROCEDURE — 83605 ASSAY OF LACTIC ACID: CPT

## 2022-08-17 PROCEDURE — 99285 EMERGENCY DEPT VISIT HI MDM: CPT

## 2022-08-17 PROCEDURE — 96365 THER/PROPH/DIAG IV INF INIT: CPT

## 2022-08-17 PROCEDURE — 93005 ELECTROCARDIOGRAM TRACING: CPT | Performed by: EMERGENCY MEDICINE

## 2022-08-17 PROCEDURE — 87635 SARS-COV-2 COVID-19 AMP PRB: CPT

## 2022-08-17 PROCEDURE — 2140000000 HC CCU INTERMEDIATE R&B

## 2022-08-17 PROCEDURE — 85007 BL SMEAR W/DIFF WBC COUNT: CPT

## 2022-08-17 RX ORDER — METHYLPREDNISOLONE SODIUM SUCCINATE 125 MG/2ML
125 INJECTION, POWDER, LYOPHILIZED, FOR SOLUTION INTRAMUSCULAR; INTRAVENOUS ONCE
Status: COMPLETED | OUTPATIENT
Start: 2022-08-17 | End: 2022-08-17

## 2022-08-17 RX ORDER — BUSPIRONE HYDROCHLORIDE 5 MG/1
5 TABLET ORAL 2 TIMES DAILY
Status: DISCONTINUED | OUTPATIENT
Start: 2022-08-18 | End: 2022-08-19 | Stop reason: HOSPADM

## 2022-08-17 RX ORDER — ALBUTEROL SULFATE 2.5 MG/3ML
2.5 SOLUTION RESPIRATORY (INHALATION) ONCE
Status: COMPLETED | OUTPATIENT
Start: 2022-08-17 | End: 2022-08-17

## 2022-08-17 RX ORDER — IPRATROPIUM BROMIDE AND ALBUTEROL SULFATE 2.5; .5 MG/3ML; MG/3ML
1 SOLUTION RESPIRATORY (INHALATION) ONCE
Status: COMPLETED | OUTPATIENT
Start: 2022-08-17 | End: 2022-08-17

## 2022-08-17 RX ORDER — MONTELUKAST SODIUM 10 MG/1
10 TABLET ORAL NIGHTLY
Status: DISCONTINUED | OUTPATIENT
Start: 2022-08-18 | End: 2022-08-19 | Stop reason: HOSPADM

## 2022-08-17 RX ORDER — SODIUM CHLORIDE 0.9 % (FLUSH) 0.9 %
5-40 SYRINGE (ML) INJECTION EVERY 12 HOURS SCHEDULED
Status: DISCONTINUED | OUTPATIENT
Start: 2022-08-18 | End: 2022-08-19 | Stop reason: HOSPADM

## 2022-08-17 RX ORDER — SODIUM CHLORIDE 0.9 % (FLUSH) 0.9 %
5-40 SYRINGE (ML) INJECTION PRN
Status: DISCONTINUED | OUTPATIENT
Start: 2022-08-17 | End: 2022-08-17

## 2022-08-17 RX ORDER — SODIUM CHLORIDE 9 MG/ML
INJECTION, SOLUTION INTRAVENOUS PRN
Status: DISCONTINUED | OUTPATIENT
Start: 2022-08-17 | End: 2022-08-19 | Stop reason: HOSPADM

## 2022-08-17 RX ORDER — CARVEDILOL 6.25 MG/1
6.25 TABLET ORAL 2 TIMES DAILY WITH MEALS
Status: DISCONTINUED | OUTPATIENT
Start: 2022-08-18 | End: 2022-08-19 | Stop reason: HOSPADM

## 2022-08-17 RX ORDER — NITROGLYCERIN 0.4 MG/1
0.4 TABLET SUBLINGUAL EVERY 5 MIN PRN
Status: DISCONTINUED | OUTPATIENT
Start: 2022-08-17 | End: 2022-08-18

## 2022-08-17 RX ORDER — ONDANSETRON 2 MG/ML
4 INJECTION INTRAMUSCULAR; INTRAVENOUS EVERY 6 HOURS PRN
Status: DISCONTINUED | OUTPATIENT
Start: 2022-08-17 | End: 2022-08-18

## 2022-08-17 RX ORDER — LISINOPRIL 20 MG/1
20 TABLET ORAL DAILY
Status: DISCONTINUED | OUTPATIENT
Start: 2022-08-18 | End: 2022-08-18

## 2022-08-17 RX ORDER — AZITHROMYCIN 250 MG/1
500 TABLET, FILM COATED ORAL DAILY
Status: DISCONTINUED | OUTPATIENT
Start: 2022-08-18 | End: 2022-08-18

## 2022-08-17 RX ORDER — 0.9 % SODIUM CHLORIDE 0.9 %
30 INTRAVENOUS SOLUTION INTRAVENOUS ONCE
Status: COMPLETED | OUTPATIENT
Start: 2022-08-17 | End: 2022-08-18

## 2022-08-17 RX ORDER — ENOXAPARIN SODIUM 100 MG/ML
30 INJECTION SUBCUTANEOUS DAILY
Status: DISCONTINUED | OUTPATIENT
Start: 2022-08-18 | End: 2022-08-19 | Stop reason: HOSPADM

## 2022-08-17 RX ORDER — POLYETHYLENE GLYCOL 3350 17 G/17G
17 POWDER, FOR SOLUTION ORAL DAILY PRN
Status: DISCONTINUED | OUTPATIENT
Start: 2022-08-17 | End: 2022-08-19 | Stop reason: HOSPADM

## 2022-08-17 RX ORDER — DOXEPIN HYDROCHLORIDE 10 MG/1
10 CAPSULE ORAL NIGHTLY
Status: DISCONTINUED | OUTPATIENT
Start: 2022-08-18 | End: 2022-08-19 | Stop reason: HOSPADM

## 2022-08-17 RX ORDER — PREDNISONE 20 MG/1
40 TABLET ORAL DAILY
Status: DISCONTINUED | OUTPATIENT
Start: 2022-08-19 | End: 2022-08-19 | Stop reason: HOSPADM

## 2022-08-17 RX ORDER — ISOSORBIDE MONONITRATE 30 MG/1
30 TABLET, EXTENDED RELEASE ORAL DAILY
Status: DISCONTINUED | OUTPATIENT
Start: 2022-08-18 | End: 2022-08-19 | Stop reason: HOSPADM

## 2022-08-17 RX ORDER — GUAIFENESIN 600 MG/1
600 TABLET, EXTENDED RELEASE ORAL 2 TIMES DAILY
Status: DISCONTINUED | OUTPATIENT
Start: 2022-08-18 | End: 2022-08-19 | Stop reason: HOSPADM

## 2022-08-17 RX ORDER — SODIUM CHLORIDE 0.9 % (FLUSH) 0.9 %
5-40 SYRINGE (ML) INJECTION EVERY 12 HOURS SCHEDULED
Status: DISCONTINUED | OUTPATIENT
Start: 2022-08-17 | End: 2022-08-17

## 2022-08-17 RX ORDER — ALBUTEROL SULFATE 90 UG/1
2 AEROSOL, METERED RESPIRATORY (INHALATION) EVERY 6 HOURS PRN
Status: DISCONTINUED | OUTPATIENT
Start: 2022-08-17 | End: 2022-08-19 | Stop reason: HOSPADM

## 2022-08-17 RX ORDER — SODIUM CHLORIDE 0.9 % (FLUSH) 0.9 %
5-40 SYRINGE (ML) INJECTION PRN
Status: DISCONTINUED | OUTPATIENT
Start: 2022-08-17 | End: 2022-08-19 | Stop reason: HOSPADM

## 2022-08-17 RX ORDER — ATORVASTATIN CALCIUM 40 MG/1
40 TABLET, FILM COATED ORAL DAILY
Status: DISCONTINUED | OUTPATIENT
Start: 2022-08-18 | End: 2022-08-18

## 2022-08-17 RX ORDER — ONDANSETRON 4 MG/1
4 TABLET, ORALLY DISINTEGRATING ORAL EVERY 8 HOURS PRN
Status: DISCONTINUED | OUTPATIENT
Start: 2022-08-17 | End: 2022-08-19 | Stop reason: HOSPADM

## 2022-08-17 RX ORDER — METHYLPREDNISOLONE SODIUM SUCCINATE 40 MG/ML
40 INJECTION, POWDER, LYOPHILIZED, FOR SOLUTION INTRAMUSCULAR; INTRAVENOUS EVERY 6 HOURS
Status: COMPLETED | OUTPATIENT
Start: 2022-08-18 | End: 2022-08-18

## 2022-08-17 RX ORDER — ACETAMINOPHEN 650 MG/1
650 SUPPOSITORY RECTAL EVERY 6 HOURS PRN
Status: DISCONTINUED | OUTPATIENT
Start: 2022-08-17 | End: 2022-08-18

## 2022-08-17 RX ORDER — IPRATROPIUM BROMIDE AND ALBUTEROL SULFATE 2.5; .5 MG/3ML; MG/3ML
1 SOLUTION RESPIRATORY (INHALATION)
Status: DISCONTINUED | OUTPATIENT
Start: 2022-08-18 | End: 2022-08-18

## 2022-08-17 RX ORDER — ASPIRIN 81 MG/1
81 TABLET, CHEWABLE ORAL DAILY
Status: DISCONTINUED | OUTPATIENT
Start: 2022-08-18 | End: 2022-08-18 | Stop reason: ALTCHOICE

## 2022-08-17 RX ORDER — IPRATROPIUM BROMIDE AND ALBUTEROL SULFATE 2.5; .5 MG/3ML; MG/3ML
1 SOLUTION RESPIRATORY (INHALATION) 4 TIMES DAILY PRN
Status: DISCONTINUED | OUTPATIENT
Start: 2022-08-17 | End: 2022-08-18

## 2022-08-17 RX ORDER — FORMOTEROL FUMARATE 20 UG/2ML
20 SOLUTION RESPIRATORY (INHALATION) 2 TIMES DAILY
Status: DISCONTINUED | OUTPATIENT
Start: 2022-08-18 | End: 2022-08-19 | Stop reason: HOSPADM

## 2022-08-17 RX ORDER — ACETAMINOPHEN 325 MG/1
650 TABLET ORAL EVERY 6 HOURS PRN
Status: DISCONTINUED | OUTPATIENT
Start: 2022-08-17 | End: 2022-08-19 | Stop reason: HOSPADM

## 2022-08-17 RX ORDER — SODIUM CHLORIDE 9 MG/ML
INJECTION, SOLUTION INTRAVENOUS PRN
Status: DISCONTINUED | OUTPATIENT
Start: 2022-08-17 | End: 2022-08-17

## 2022-08-17 RX ADMIN — ALBUTEROL SULFATE 2.5 MG: 2.5 SOLUTION RESPIRATORY (INHALATION) at 21:11

## 2022-08-17 RX ADMIN — IPRATROPIUM BROMIDE AND ALBUTEROL SULFATE 1 AMPULE: .5; 2.5 SOLUTION RESPIRATORY (INHALATION) at 21:11

## 2022-08-17 RX ADMIN — CEFTRIAXONE SODIUM 1000 MG: 1 INJECTION, POWDER, FOR SOLUTION INTRAMUSCULAR; INTRAVENOUS at 23:04

## 2022-08-17 RX ADMIN — METHYLPREDNISOLONE SODIUM SUCCINATE 125 MG: 125 INJECTION, POWDER, FOR SOLUTION INTRAMUSCULAR; INTRAVENOUS at 21:18

## 2022-08-17 RX ADMIN — SODIUM CHLORIDE 1401 ML: 9 INJECTION, SOLUTION INTRAVENOUS at 23:04

## 2022-08-17 ASSESSMENT — PAIN DESCRIPTION - LOCATION: LOCATION: ABDOMEN;BACK;CHEST

## 2022-08-17 ASSESSMENT — PAIN DESCRIPTION - ORIENTATION: ORIENTATION: RIGHT;LEFT

## 2022-08-17 ASSESSMENT — PAIN DESCRIPTION - DESCRIPTORS: DESCRIPTORS: DISCOMFORT;ACHING

## 2022-08-17 ASSESSMENT — PAIN SCALES - GENERAL: PAINLEVEL_OUTOF10: 5

## 2022-08-17 ASSESSMENT — PAIN DESCRIPTION - ONSET: ONSET: ON-GOING

## 2022-08-17 ASSESSMENT — PAIN DESCRIPTION - FREQUENCY: FREQUENCY: INTERMITTENT

## 2022-08-17 ASSESSMENT — PAIN DESCRIPTION - PAIN TYPE: TYPE: ACUTE PAIN

## 2022-08-17 ASSESSMENT — PAIN - FUNCTIONAL ASSESSMENT: PAIN_FUNCTIONAL_ASSESSMENT: ACTIVITIES ARE NOT PREVENTED

## 2022-08-18 PROBLEM — I50.22 CHRONIC SYSTOLIC HEART FAILURE (HCC): Status: ACTIVE | Noted: 2022-08-18

## 2022-08-18 PROBLEM — E43 SEVERE MALNUTRITION (HCC): Status: ACTIVE | Noted: 2022-08-18

## 2022-08-18 LAB
ALBUMIN SERPL-MCNC: 3.2 GM/DL (ref 3.4–5)
ALP BLD-CCNC: 59 IU/L (ref 40–129)
ALT SERPL-CCNC: 10 U/L (ref 10–40)
ANION GAP SERPL CALCULATED.3IONS-SCNC: 11 MMOL/L (ref 4–16)
AST SERPL-CCNC: 18 IU/L (ref 15–37)
BASOPHILS ABSOLUTE: 0.1 K/CU MM
BASOPHILS RELATIVE PERCENT: 0.5 % (ref 0–1)
BILIRUB SERPL-MCNC: 0.4 MG/DL (ref 0–1)
BUN BLDV-MCNC: 11 MG/DL (ref 6–23)
CALCIUM SERPL-MCNC: 8.3 MG/DL (ref 8.3–10.6)
CHLORIDE BLD-SCNC: 96 MMOL/L (ref 99–110)
CO2: 24 MMOL/L (ref 21–32)
CREAT SERPL-MCNC: 0.3 MG/DL (ref 0.6–1.1)
D DIMER: 351 NG/ML(DDU)
DIFFERENTIAL TYPE: ABNORMAL
EKG ATRIAL RATE: 104 BPM
EKG DIAGNOSIS: NORMAL
EKG P-R INTERVAL: 208 MS
EKG Q-T INTERVAL: 428 MS
EKG QRS DURATION: 142 MS
EKG QTC CALCULATION (BAZETT): 562 MS
EKG R AXIS: -65 DEGREES
EKG T AXIS: 44 DEGREES
EKG VENTRICULAR RATE: 104 BPM
EOSINOPHILS ABSOLUTE: 0 K/CU MM
EOSINOPHILS RELATIVE PERCENT: 0.1 % (ref 0–3)
GFR AFRICAN AMERICAN: >60 ML/MIN/1.73M2
GFR NON-AFRICAN AMERICAN: >60 ML/MIN/1.73M2
GLUCOSE BLD-MCNC: 139 MG/DL (ref 70–99)
HCT VFR BLD CALC: 35.2 % (ref 37–47)
HEMOGLOBIN: 11.9 GM/DL (ref 12.5–16)
HIGH SENSITIVE C-REACTIVE PROTEIN: 198.4 MG/L
IMMATURE NEUTROPHIL %: 1 % (ref 0–0.43)
LACTATE: 1.3 MMOL/L (ref 0.4–2)
LEGIONELLA URINARY AG: NEGATIVE
LYMPHOCYTES ABSOLUTE: 1.7 K/CU MM
LYMPHOCYTES RELATIVE PERCENT: 12.5 % (ref 24–44)
MCH RBC QN AUTO: 31.3 PG (ref 27–31)
MCHC RBC AUTO-ENTMCNC: 33.8 % (ref 32–36)
MCV RBC AUTO: 92.6 FL (ref 78–100)
MONOCYTES ABSOLUTE: 0.1 K/CU MM
MONOCYTES RELATIVE PERCENT: 1 % (ref 0–4)
PDW BLD-RTO: 11.8 % (ref 11.7–14.9)
PLATELET # BLD: 469 K/CU MM (ref 140–440)
PMV BLD AUTO: 9.2 FL (ref 7.5–11.1)
POTASSIUM SERPL-SCNC: 4.3 MMOL/L (ref 3.5–5.1)
PROCALCITONIN: 0.05
RBC # BLD: 3.8 M/CU MM (ref 4.2–5.4)
SEGMENTED NEUTROPHILS ABSOLUTE COUNT: 11.5 K/CU MM
SEGMENTED NEUTROPHILS RELATIVE PERCENT: 84.9 % (ref 36–66)
SODIUM BLD-SCNC: 131 MMOL/L (ref 135–145)
STREP PNEUMONIAE ANTIGEN: NORMAL
TOTAL IMMATURE NEUTOROPHIL: 0.13 K/CU MM
TOTAL PROTEIN: 6.3 GM/DL (ref 6.4–8.2)
WBC # BLD: 13.6 K/CU MM (ref 4–10.5)

## 2022-08-18 PROCEDURE — 6360000002 HC RX W HCPCS: Performed by: EMERGENCY MEDICINE

## 2022-08-18 PROCEDURE — 2140000000 HC CCU INTERMEDIATE R&B

## 2022-08-18 PROCEDURE — 83605 ASSAY OF LACTIC ACID: CPT

## 2022-08-18 PROCEDURE — 87449 NOS EACH ORGANISM AG IA: CPT

## 2022-08-18 PROCEDURE — 85379 FIBRIN DEGRADATION QUANT: CPT

## 2022-08-18 PROCEDURE — 94640 AIRWAY INHALATION TREATMENT: CPT

## 2022-08-18 PROCEDURE — 84145 PROCALCITONIN (PCT): CPT

## 2022-08-18 PROCEDURE — 87899 AGENT NOS ASSAY W/OPTIC: CPT

## 2022-08-18 PROCEDURE — 6370000000 HC RX 637 (ALT 250 FOR IP): Performed by: NURSE PRACTITIONER

## 2022-08-18 PROCEDURE — 93010 ELECTROCARDIOGRAM REPORT: CPT | Performed by: INTERNAL MEDICINE

## 2022-08-18 PROCEDURE — 2580000003 HC RX 258: Performed by: NURSE PRACTITIONER

## 2022-08-18 PROCEDURE — 94761 N-INVAS EAR/PLS OXIMETRY MLT: CPT

## 2022-08-18 PROCEDURE — 94664 DEMO&/EVAL PT USE INHALER: CPT

## 2022-08-18 PROCEDURE — 2580000003 HC RX 258: Performed by: EMERGENCY MEDICINE

## 2022-08-18 PROCEDURE — 36591 DRAW BLOOD OFF VENOUS DEVICE: CPT

## 2022-08-18 PROCEDURE — 6370000000 HC RX 637 (ALT 250 FOR IP): Performed by: HOSPITALIST

## 2022-08-18 PROCEDURE — 6360000002 HC RX W HCPCS: Performed by: NURSE PRACTITIONER

## 2022-08-18 PROCEDURE — 2700000000 HC OXYGEN THERAPY PER DAY

## 2022-08-18 PROCEDURE — 80053 COMPREHEN METABOLIC PANEL: CPT

## 2022-08-18 PROCEDURE — 85025 COMPLETE CBC W/AUTO DIFF WBC: CPT

## 2022-08-18 PROCEDURE — 6360000002 HC RX W HCPCS: Performed by: HOSPITALIST

## 2022-08-18 RX ORDER — ATORVASTATIN CALCIUM 10 MG/1
10 TABLET, FILM COATED ORAL DAILY
COMMUNITY

## 2022-08-18 RX ORDER — IPRATROPIUM BROMIDE AND ALBUTEROL SULFATE 2.5; .5 MG/3ML; MG/3ML
1 SOLUTION RESPIRATORY (INHALATION) 4 TIMES DAILY
Status: DISCONTINUED | OUTPATIENT
Start: 2022-08-18 | End: 2022-08-19 | Stop reason: HOSPADM

## 2022-08-18 RX ORDER — ATORVASTATIN CALCIUM 40 MG/1
40 TABLET, FILM COATED ORAL NIGHTLY
Status: DISCONTINUED | OUTPATIENT
Start: 2022-08-19 | End: 2022-08-19 | Stop reason: HOSPADM

## 2022-08-18 RX ORDER — ASPIRIN 81 MG/1
81 TABLET ORAL DAILY
Status: DISCONTINUED | OUTPATIENT
Start: 2022-08-18 | End: 2022-08-19 | Stop reason: HOSPADM

## 2022-08-18 RX ORDER — LEVOFLOXACIN 750 MG/1
750 TABLET ORAL DAILY
Status: DISCONTINUED | OUTPATIENT
Start: 2022-08-18 | End: 2022-08-18

## 2022-08-18 RX ORDER — FUROSEMIDE 10 MG/ML
20 INJECTION INTRAMUSCULAR; INTRAVENOUS ONCE
Status: COMPLETED | OUTPATIENT
Start: 2022-08-18 | End: 2022-08-18

## 2022-08-18 RX ADMIN — SODIUM CHLORIDE: 9 INJECTION, SOLUTION INTRAVENOUS at 09:15

## 2022-08-18 RX ADMIN — ENOXAPARIN SODIUM 30 MG: 100 INJECTION SUBCUTANEOUS at 09:20

## 2022-08-18 RX ADMIN — SODIUM CHLORIDE, PRESERVATIVE FREE 10 ML: 5 INJECTION INTRAVENOUS at 22:29

## 2022-08-18 RX ADMIN — FORMOTEROL FUMARATE DIHYDRATE 20 MCG: 20 SOLUTION RESPIRATORY (INHALATION) at 19:46

## 2022-08-18 RX ADMIN — MONTELUKAST 10 MG: 10 TABLET, FILM COATED ORAL at 22:29

## 2022-08-18 RX ADMIN — DOXEPIN HYDROCHLORIDE 10 MG: 10 CAPSULE ORAL at 22:29

## 2022-08-18 RX ADMIN — SODIUM CHLORIDE, PRESERVATIVE FREE 10 ML: 5 INJECTION INTRAVENOUS at 04:07

## 2022-08-18 RX ADMIN — IPRATROPIUM BROMIDE AND ALBUTEROL SULFATE 1 AMPULE: .5; 3 SOLUTION RESPIRATORY (INHALATION) at 11:30

## 2022-08-18 RX ADMIN — GUAIFENESIN 600 MG: 600 TABLET, EXTENDED RELEASE ORAL at 09:13

## 2022-08-18 RX ADMIN — GUAIFENESIN 600 MG: 600 TABLET, EXTENDED RELEASE ORAL at 22:29

## 2022-08-18 RX ADMIN — ASPIRIN 81 MG CHEWABLE TABLET 81 MG: 81 TABLET CHEWABLE at 09:13

## 2022-08-18 RX ADMIN — ISOSORBIDE MONONITRATE 30 MG: 30 TABLET, EXTENDED RELEASE ORAL at 09:13

## 2022-08-18 RX ADMIN — ATORVASTATIN CALCIUM 40 MG: 40 TABLET, FILM COATED ORAL at 09:13

## 2022-08-18 RX ADMIN — AZITHROMYCIN MONOHYDRATE 500 MG: 500 INJECTION, POWDER, LYOPHILIZED, FOR SOLUTION INTRAVENOUS at 09:16

## 2022-08-18 RX ADMIN — SODIUM CHLORIDE: 9 INJECTION, SOLUTION INTRAVENOUS at 10:44

## 2022-08-18 RX ADMIN — CARVEDILOL 6.25 MG: 6.25 TABLET, FILM COATED ORAL at 09:19

## 2022-08-18 RX ADMIN — FORMOTEROL FUMARATE DIHYDRATE 20 MCG: 20 SOLUTION RESPIRATORY (INHALATION) at 08:00

## 2022-08-18 RX ADMIN — AZITHROMYCIN MONOHYDRATE 500 MG: 500 INJECTION, POWDER, LYOPHILIZED, FOR SOLUTION INTRAVENOUS at 00:35

## 2022-08-18 RX ADMIN — BUSPIRONE HYDROCHLORIDE 5 MG: 5 TABLET ORAL at 01:45

## 2022-08-18 RX ADMIN — ACETAMINOPHEN 325MG 650 MG: 325 TABLET ORAL at 22:33

## 2022-08-18 RX ADMIN — IPRATROPIUM BROMIDE AND ALBUTEROL SULFATE 1 AMPULE: .5; 3 SOLUTION RESPIRATORY (INHALATION) at 16:00

## 2022-08-18 RX ADMIN — CARVEDILOL 6.25 MG: 6.25 TABLET, FILM COATED ORAL at 17:25

## 2022-08-18 RX ADMIN — BUSPIRONE HYDROCHLORIDE 5 MG: 5 TABLET ORAL at 09:13

## 2022-08-18 RX ADMIN — METHYLPREDNISOLONE SODIUM SUCCINATE 40 MG: 40 INJECTION, POWDER, FOR SOLUTION INTRAMUSCULAR; INTRAVENOUS at 04:07

## 2022-08-18 RX ADMIN — DOXEPIN HYDROCHLORIDE 10 MG: 10 CAPSULE ORAL at 01:45

## 2022-08-18 RX ADMIN — SODIUM CHLORIDE, PRESERVATIVE FREE 10 ML: 5 INJECTION INTRAVENOUS at 10:43

## 2022-08-18 RX ADMIN — FUROSEMIDE 20 MG: 10 INJECTION INTRAMUSCULAR; INTRAVENOUS at 04:07

## 2022-08-18 RX ADMIN — METHYLPREDNISOLONE SODIUM SUCCINATE 40 MG: 40 INJECTION, POWDER, FOR SOLUTION INTRAMUSCULAR; INTRAVENOUS at 10:43

## 2022-08-18 RX ADMIN — BUSPIRONE HYDROCHLORIDE 5 MG: 5 TABLET ORAL at 22:29

## 2022-08-18 RX ADMIN — IPRATROPIUM BROMIDE AND ALBUTEROL SULFATE 1 AMPULE: .5; 3 SOLUTION RESPIRATORY (INHALATION) at 19:46

## 2022-08-18 RX ADMIN — IPRATROPIUM BROMIDE AND ALBUTEROL SULFATE 1 AMPULE: .5; 3 SOLUTION RESPIRATORY (INHALATION) at 08:00

## 2022-08-18 RX ADMIN — CEFTRIAXONE SODIUM 1000 MG: 1 INJECTION, POWDER, FOR SOLUTION INTRAMUSCULAR; INTRAVENOUS at 10:48

## 2022-08-18 RX ADMIN — GUAIFENESIN 600 MG: 600 TABLET, EXTENDED RELEASE ORAL at 01:45

## 2022-08-18 RX ADMIN — SODIUM CHLORIDE: 9 INJECTION, SOLUTION INTRAVENOUS at 00:34

## 2022-08-18 RX ADMIN — MONTELUKAST 10 MG: 10 TABLET, FILM COATED ORAL at 01:45

## 2022-08-18 ASSESSMENT — PAIN SCALES - GENERAL
PAINLEVEL_OUTOF10: 3
PAINLEVEL_OUTOF10: 4
PAINLEVEL_OUTOF10: 1
PAINLEVEL_OUTOF10: 2
PAINLEVEL_OUTOF10: 2
PAINLEVEL_OUTOF10: 0
PAINLEVEL_OUTOF10: 3

## 2022-08-18 ASSESSMENT — ENCOUNTER SYMPTOMS
SHORTNESS OF BREATH: 1
ALLERGIC/IMMUNOLOGIC NEGATIVE: 1
WHEEZING: 1
COUGH: 1
EYES NEGATIVE: 1
GASTROINTESTINAL NEGATIVE: 1

## 2022-08-18 ASSESSMENT — PAIN DESCRIPTION - DESCRIPTORS: DESCRIPTORS: POUNDING

## 2022-08-18 ASSESSMENT — PAIN - FUNCTIONAL ASSESSMENT: PAIN_FUNCTIONAL_ASSESSMENT: ACTIVITIES ARE NOT PREVENTED

## 2022-08-18 ASSESSMENT — LIFESTYLE VARIABLES: HOW OFTEN DO YOU HAVE A DRINK CONTAINING ALCOHOL: NEVER

## 2022-08-18 ASSESSMENT — PAIN DESCRIPTION - ORIENTATION: ORIENTATION: ANTERIOR

## 2022-08-18 ASSESSMENT — PAIN DESCRIPTION - LOCATION: LOCATION: HEAD

## 2022-08-18 NOTE — PROGRESS NOTES
V2.0  [facility] [place]      Name:  Celina Moreno /Age/Sex: 1950  (67 y.o. female)   MRN & CSN:  [id number] & [id number] Encounter Date/Time: 2022 6:27 PM EDT    Location:   PCP: Silvano Gaines, [facility] Day: 2    Assessment and Plan:   Celina Moreno is a 67 y.o. female  who presents with COPD exacerbation ([facility] [address]      Plan:  Acute on chronic respiratory failure with hypoxia secondary to COPD exacerbation. Patient has been weaned down to her baseline of O2 at 2 L. Continue DuoNebs, steroids, Mucinex. Suspected pneumonia, continue with IV antibiotics, urine Legionella urine strep are pending. Procalcitonin is 0.049 doubt pneumonia. SIRS, present on admission now resolved  Hyponatremia, stable, continue to monitor  Hyperlipidemia continue atorvastatin  Hypertension continue Coreg  History of CAD, no chest pain status post CABG x4 continue any current home medications  Depression continue BuSpar    Diet ADULT DIET; Regular; No Added Salt (3-4 gm)  ADULT ORAL NUTRITION SUPPLEMENT; Breakfast, Lunch, Dinner; Standard High Calorie/High Protein Oral Supplement  ADULT ORAL NUTRITION SUPPLEMENT; Lunch; Frozen Oral Supplement   DVT Prophylaxis [x] Lovenox, []  Heparin, [] SCDs, [] Ambulation,  [] Eliquis, [] Xarelto  [] Coumadin   Code Status Full Code   Disposition From: Home  Expected Disposition: Home  Estimated Date of Discharge: 1 to 2 days  Patient requires continued admission due to COPD exacerbation   Surrogate Decision Maker/ NATALIA Hendrickson/no [facility] docs     Subjective:     Chief Complaint: Shortness of Breath       Patient seen and examined at bedside, she is doing well she is on 2 L oxygen nasal cannula which is her home dose she is still short of breath with speaking. She has no complaints of any chest pain no abdominal pain no nausea no vomiting no diarrhea.          Review of Systems:    10 point review of systems completed is negative unless stated above        Objective: Intake/Output Summary (Last 24 hours) at 8/18/2022 1827  Last data filed at 8/18/2022 1622  Gross per 24 hour   Intake 240 ml   Output 1050 ml   Net -810 ml        Vitals:   Vitals:    08/18/22 1721   BP: 139/80   Pulse: 98   Resp: 18   Temp: 97.4 °F (36.3 °C)   SpO2: 94%       Physical Exam:     General: NAD  Eyes: EOMI  ENT: neck supple  Cardiovascular: Regular rate. Respiratory: Scattered wheezes  Gastrointestinal: Soft, non tender  Genitourinary: no suprapubic tenderness  Musculoskeletal: No edema  Skin: warm, dry  Neuro: Alert. Psych: Mood appropriate.      Medications:   Medications:    ipratropium-albuterol  1 ampule Inhalation 4x daily    cefTRIAXone (ROCEPHIN) IV  1,000 mg IntraVENous Q24H    azithromycin  500 mg IntraVENous Q24H    aspirin  81 mg Oral Daily    [START ON 8/19/2022] atorvastatin  40 mg Oral Nightly    formoterol  20 mcg Nebulization BID    busPIRone  5 mg Oral BID    carvedilol  6.25 mg Oral BID WC    doxepin  10 mg Oral Nightly    guaiFENesin  600 mg Oral BID    isosorbide mononitrate  30 mg Oral Daily    montelukast  10 mg Oral Nightly    sodium chloride flush  5-40 mL IntraVENous 2 times per day    enoxaparin  30 mg SubCUTAneous Daily    [START ON 8/19/2022] predniSONE  40 mg Oral Daily      Infusions:    sodium chloride 50 mL/hr at 08/18/22 1044     PRN Meds: albuterol sulfate HFA, 2 puff, Q6H PRN  sodium chloride flush, 5-40 mL, PRN  sodium chloride, , PRN  ondansetron, 4 mg, Q8H PRN  polyethylene glycol, 17 g, Daily PRN  acetaminophen, 650 mg, Q6H PRN        Labs      Recent Results (from the past 24 hour(s))   CBC with Auto Differential    Collection Time: 08/17/22  9:00 PM   Result Value Ref Range    WBC 16.9 (H) 4.0 - 10.5 K/CU MM    RBC 4.11 (L) 4.2 - 5.4 M/CU MM    Hemoglobin 12.9 12.5 - 16.0 GM/DL    Hematocrit 37.4 37 - 47 %    MCV 91.0 78 - 100 FL    MCH 31.4 (H) 27 - 31 PG    MCHC 34.5 32.0 - 36.0 %    RDW 11.8 11.7 - 14.9 %    Platelets 524 (H) 140 - 440 K/CU MM    MPV 8.9 7.5 - 11.1 FL    Segs Relative 60.0 36 - 66 %    Lymphocytes % 28.0 24 - 44 %    Monocytes % 12.0 (H) 0 - 4 %    Segs Absolute 10.2 K/CU MM    Lymphocytes Absolute 4.7 K/CU MM    Monocytes Absolute 2.0 K/CU MM    Differential Type MANUAL DIFFERENTIAL     RBC Morphology RBC MORPHOLOGY NORMAL     PLT Morphology PLATELETS NORMAL IN NUMBER AND SIZE    Comprehensive Metabolic Panel w/ Reflex to MG    Collection Time: 08/17/22  9:00 PM   Result Value Ref Range    Sodium 130 (L) 135 - 145 MMOL/L    Potassium 4.0 3.5 - 5.1 MMOL/L    Chloride 92 (L) 99 - 110 mMol/L    CO2 26 21 - 32 MMOL/L    BUN 22 6 - 23 MG/DL    Creatinine 0.4 (L) 0.6 - 1.1 MG/DL    Glucose 107 (H) 70 - 99 MG/DL    Calcium 8.9 8.3 - 10.6 MG/DL    Albumin 3.3 (L) 3.4 - 5.0 GM/DL    Total Protein 7.1 6.4 - 8.2 GM/DL    Total Bilirubin 0.4 0.0 - 1.0 MG/DL    ALT 11 10 - 40 U/L    AST 14 (L) 15 - 37 IU/L    Alkaline Phosphatase 67 40 - 129 IU/L    GFR Non-African American >60 >60 mL/min/1.73m2    GFR African American >60 >60 mL/min/1.73m2    Anion Gap 12 4 - 16   Lipase    Collection Time: 08/17/22  9:00 PM   Result Value Ref Range    Lipase 10 (L) 13 - 60 IU/L   Troponin    Collection Time: 08/17/22  9:00 PM   Result Value Ref Range    Troponin T <0.010 <0.01 NG/ML   Brain Natriuretic Peptide    Collection Time: 08/17/22  9:00 PM   Result Value Ref Range    Pro-.1 (H) <300 PG/ML   POCT Venous    Collection Time: 08/17/22  9:15 PM   Result Value Ref Range    pH, Derrick 7.42 7.32 - 7.42    pCO2, Derrick 40.5 38 - 52 mmHG    pO2, Derrick 28.1 28 - 48 mmHG    Base Exc, Mixed 1.5 0 - 2.3    Base Excess HIDE 0 - 2.4    HCO3, Venous 26.2 (H) 19 - 25 MMOL/L    O2 Sat, Derrick 54.2 50 - 70 %    CO2 Content 27.4 (H) 19 - 24 MMOL/L    Source: Venous    EKG 12 Lead    Collection Time: 08/17/22  9:21 PM   Result Value Ref Range    Ventricular Rate 104 BPM    Atrial Rate 104 BPM    P-R Interval 208 ms    QRS Duration 142 ms    Q-T Interval 428 ms QTc Calculation (Bazett) 562 ms    R Axis -65 degrees    T Axis 44 degrees    Diagnosis       Sinus tachycardia with fusion complexes  Left bundle branch block  Abnormal ECG  When compared with ECG of 11-OCT-2021 14:56,  fusion complexes are now present  ST elevation now present in Lateral leads  T wave inversion no longer evident in Lateral leads  QT has lengthened     COVID-19, Rapid    Collection Time: 08/17/22  9:30 PM    Specimen: Nasopharyngeal   Result Value Ref Range    SARS-CoV-2, NAAT NOT DETECTED NOT DETECTED   Lactate, Sepsis    Collection Time: 08/17/22 11:00 PM   Result Value Ref Range    Lactic Acid, Sepsis 0.9 0.5 - 1.9 mMOL/L   D-Dimer, Quantitative    Collection Time: 08/18/22 12:30 AM   Result Value Ref Range    D-Dimer, Quant 351 (H) <230 NG/mL(DDU)   Strep Pneumoniae Antigen    Collection Time: 08/18/22  3:20 AM    Specimen: CSF   Result Value Ref Range    Strep pneumo Ag URINE NEGATIVE    Legionella antigen, urine    Collection Time: 08/18/22  3:20 AM    Specimen: Urine   Result Value Ref Range    Legionella Urinary Ag NEGATIVE NEGATIVE   Comprehensive Metabolic Panel w/ Reflex to MG    Collection Time: 08/18/22  5:15 AM   Result Value Ref Range    Sodium 131 (L) 135 - 145 MMOL/L    Potassium 4.3 3.5 - 5.1 MMOL/L    Chloride 96 (L) 99 - 110 mMol/L    CO2 24 21 - 32 MMOL/L    BUN 11 6 - 23 MG/DL    Creatinine 0.3 (L) 0.6 - 1.1 MG/DL    Glucose 139 (H) 70 - 99 MG/DL    Calcium 8.3 8.3 - 10.6 MG/DL    Albumin 3.2 (L) 3.4 - 5.0 GM/DL    Total Protein 6.3 (L) 6.4 - 8.2 GM/DL    Total Bilirubin 0.4 0.0 - 1.0 MG/DL    ALT 10 10 - 40 U/L    AST 18 15 - 37 IU/L    Alkaline Phosphatase 59 40 - 129 IU/L    GFR Non-African American >60 >60 mL/min/1.73m2    GFR African American >60 >60 mL/min/1.73m2    Anion Gap 11 4 - 16   Lactic Acid    Collection Time: 08/18/22  5:15 AM   Result Value Ref Range    Lactate 1.3 0.4 - 2.0 mMOL/L   CBC with Auto Differential    Collection Time: 08/18/22  5:15 AM   Result Value Ref Range    WBC 13.6 (H) 4.0 - 10.5 K/CU MM    RBC 3.80 (L) 4.2 - 5.4 M/CU MM    Hemoglobin 11.9 (L) 12.5 - 16.0 GM/DL    Hematocrit 35.2 (L) 37 - 47 %    MCV 92.6 78 - 100 FL    MCH 31.3 (H) 27 - 31 PG    MCHC 33.8 32.0 - 36.0 %    RDW 11.8 11.7 - 14.9 %    Platelets 061 (H) 015 - 440 K/CU MM    MPV 9.2 7.5 - 11.1 FL    Differential Type AUTOMATED DIFFERENTIAL     Segs Relative 84.9 (H) 36 - 66 %    Lymphocytes % 12.5 (L) 24 - 44 %    Monocytes % 1.0 0 - 4 %    Eosinophils % 0.1 0 - 3 %    Basophils % 0.5 0 - 1 %    Segs Absolute 11.5 K/CU MM    Lymphocytes Absolute 1.7 K/CU MM    Monocytes Absolute 0.1 K/CU MM    Eosinophils Absolute 0.0 K/CU MM    Basophils Absolute 0.1 K/CU MM    Immature Neutrophil % 1.0 (H) 0 - 0.43 %    Total Immature Neutrophil 0.13 K/CU MM        Imaging/Diagnostics Last 24 Hours   XR CHEST PORTABLE    Result Date: 8/17/2022  EXAMINATION: ONE XRAY VIEW OF THE CHEST 8/17/2022 9:01 pm COMPARISON: Chest x-ray April 25, 2022 HISTORY: ORDERING SYSTEM PROVIDED HISTORY: sob TECHNOLOGIST PROVIDED HISTORY: Reason for exam:->sob Reason for Exam: sob     Cardiac silhouette is stable. Atherosclerosis of the aorta. Postoperative changes of median sternotomy. Chronic interstitial changes of the lungs with increased airspace opacities at the right mid and lower lung fields and left lower lobe which may reflect atelectasis versus infiltrate. No pleural effusion. No pneumothorax. Osseous structures appear intact. RECOMMENDATION: 1. Chronic lung changes with increased airspace opacities at the right mid lung and bilateral lower lobes which may reflect atelectasis versus infiltrates.        Electronically signed by CHEPE Hays NP on 8/18/2022 at 6:27 PM

## 2022-08-18 NOTE — CARE COORDINATION
CM met with the patient to initiate discharge planning. Patient lives at home with her  and their grandson, has insurance with Rx coverage & PCP, and stated that she is independent with ADL's. Patient stated that she uses a cane at home and a wheelchair when she goes out for appointments. Patient requires home oxygen 24 hours/day (2 l/nc baseline) and uses an inhaler & nebulizer machine at home. Patient reports that in addition to her PCP she also sees Dr. Mars Reese for pulmonology care. Patient plans to return home upon discharge and is unable to identify any needs at this time. CM available if needs arise.

## 2022-08-18 NOTE — PROGRESS NOTES
Pt's admission skin assessment done by this RN and NICKOLAS Cedillo. Pt has no skin issues at this time.

## 2022-08-18 NOTE — PLAN OF CARE
Problem: Discharge Planning  Goal: Discharge to home or other facility with appropriate resources  Outcome: Progressing     Problem: Pain  Goal: Verbalizes/displays adequate comfort level or baseline comfort level  Outcome: Progressing     Problem: Safety - Adult  Goal: Free from fall injury  Outcome: Progressing     Problem: Cardiovascular - Adult  Goal: Maintains optimal cardiac output and hemodynamic stability  Outcome: Progressing  Goal: Absence of cardiac dysrhythmias or at baseline  Outcome: Progressing     Problem: Chronic Conditions and Co-morbidities  Goal: Patient's chronic conditions and co-morbidity symptoms are monitored and maintained or improved  Outcome: Progressing

## 2022-08-18 NOTE — PLAN OF CARE
Problem: Discharge Planning  Goal: Discharge to home or other facility with appropriate resources  8/18/2022 1128 by Dionisio Daily, RN  Outcome: Progressing  8/18/2022 0033 by Courtney Payment, RN  Outcome: Progressing  8/18/2022 0031 by Courtney Payment, RN  Outcome: Progressing     Problem: Pain  Goal: Verbalizes/displays adequate comfort level or baseline comfort level  8/18/2022 1128 by Dionisio Daily, RN  Outcome: Progressing  8/18/2022 0033 by Courtney Payment, RN  Outcome: Progressing  8/18/2022 0031 by Courtney Payment, RN  Outcome: Progressing     Problem: Safety - Adult  Goal: Free from fall injury  8/18/2022 1128 by Dionisio Daily, RN  Outcome: Progressing  8/18/2022 0033 by Courtney Payment, RN  Outcome: Progressing  8/18/2022 0031 by Courtney Payment, RN  Outcome: Progressing     Problem: Cardiovascular - Adult  Goal: Maintains optimal cardiac output and hemodynamic stability  8/18/2022 1128 by Dionisio Daily, RN  Outcome: Progressing  8/18/2022 0033 by Courtney Payment, RN  Outcome: Progressing  8/18/2022 0031 by Courtney Payment, RN  Outcome: Progressing  Goal: Absence of cardiac dysrhythmias or at baseline  8/18/2022 1128 by Dionisio Daily, RN  Outcome: Progressing  8/18/2022 0033 by Courtney Payment, RN  Outcome: Progressing  8/18/2022 0031 by Courtney Payment, RN  Outcome: Progressing     Problem: Chronic Conditions and Co-morbidities  Goal: Patient's chronic conditions and co-morbidity symptoms are monitored and maintained or improved  8/18/2022 1128 by Dionisio Daily, RN  Outcome: Progressing  8/18/2022 0033 by Courtney Payment, RN  Outcome: Progressing  8/18/2022 0031 by Courtney Payment, RN  Outcome: Progressing

## 2022-08-18 NOTE — PROGRESS NOTES
Pt's blood drawn per nursing at this time. It was taken down to the lab by [facility] from the lab.

## 2022-08-18 NOTE — PROGRESS NOTES
Comprehensive Nutrition Assessment    Type and Reason for Visit:  Initial, Positive Nutrition Screen (Low BMI for age, Weight loss, Poor appetite)    Nutrition Recommendations/Plan:   Begin variety of high protein oral nutrition supplements  Offer High calorie, high protein nutrition therapy, add to discharge notes   Encourage small more frequent bites to increase appetite   Document Po intakes in I/o      Malnutrition Assessment:  Malnutrition Status:  Severe malnutrition (08/18/22 1334)    Context:  Acute Illness     Findings of the 6 clinical characteristics of malnutrition:  Energy Intake:  50% or less of estimated energy requirements for 5 or more days  Weight Loss:  Greater than 5% over 1 month (12%)     Body Fat Loss:  Unable to assess (Predicted to be severe based on BMI)     Muscle Mass Loss:  Unable to assess (Predicted to be severe based on BMI)    Fluid Accumulation:  Unable to assess     Strength:  Not Performed    Nutrition Assessment:    Admitted with COPD excerbation. Pt on regular diet with no salt added restrictions, no po intake documented yet during admission. Pt had poor po intake over the last 3 days. Signifncant wt loss over the last month. +2 L/min on nasal cannula. Meets criteria for acute malnutrition. Will monitor as high nutrition risk. Nutrition Related Findings:    Na 131, Alb 3.2, Glu 139 Wound Type: None       Current Nutrition Intake & Therapies:    Average Meal Intake: Unable to assess  Average Supplements Intake: None Ordered  ADULT DIET; Regular; No Added Salt (3-4 gm)    Anthropometric Measures:  Height: 5' 5\" (165.1 cm)  Ideal Body Weight (IBW): 125 lbs (57 kg)    Admission Body Weight: 100 lb 8.5 oz (45.6 kg)  Current Body Weight: 100 lb 8.5 oz (45.6 kg), 80.4 % IBW.     Current BMI (kg/m2): 16.7  Usual Body Weight: 114 lb (51.7 kg) (7/2022)  % Weight Change (Calculated): -11.8  Weight Adjustment For: No Adjustment  BMI Categories: Underweight (BMI less than 22) age over 65    Estimated Daily Nutrient Needs:  Energy Requirements Based On: Kcal/kg  Weight Used for Energy Requirements: Current  Energy (kcal/day): 5750-2745 (30-35 kcals/kg)  Weight Used for Protein Requirements: Ideal  Protein (g/day): 62-74 (1.1-1.3 g/kg)  Method Used for Fluid Requirements: 1 ml/kcal  Fluid (ml/day): 1500    Nutrition Diagnosis:   Severe malnutrition, In context of acute illness or injury related to acute injury/trauma, impaired respiratory function as evidenced by poor intake prior to admission, weight loss, weight loss greater than or equal to 5% in 1 month (12% wt loss in 1 month)    Nutrition Interventions:   Food and/or Nutrient Delivery: Modify Current Diet, Start Oral Nutrition Supplement  Nutrition Education/Counseling: Education needed (high calorie, high protein nutrition therapy)  Coordination of Nutrition Care: Continue to monitor while inpatient, Feeding Assistance/Environment Change, Interdisciplinary Rounds       Goals:     Goals: PO intake 50% or greater, by next RD assessment       Nutrition Monitoring and Evaluation:   Behavioral-Environmental Outcomes: None Identified  Food/Nutrient Intake Outcomes: Diet Advancement/Tolerance, Food and Nutrient Intake, Supplement Intake  Physical Signs/Symptoms Outcomes: Biochemical Data, Weight, GI Status, Meal Time Behavior, Nutrition Focused Physical Findings    Discharge Planning:     Too soon to determine     Eleni Blizzard, RD, LD  Contact: [phone]

## 2022-08-18 NOTE — ED PROVIDER NOTES
Emergency Department Encounter    Patient: Prerna Watkins  MRN: [id number]  : 1950  Date of Evaluation: 2022  ED Provider:  Marlen Navarrete DO    Triage Chief Complaint:   Shortness of Breath    Hoh:  Prerna Watkins is a 67 y.o. female that presents to the emergency department complaint of shortness of breath difficulty breathing. Patient states she always short of breath because she has COPD. Patient states she has been more shortness of breath for the last 1 to 2 weeks. Patient denies any tobacco use. Patient states she has been using her inhalers and nebulizer with minimal relief. Patient brought in via EMS stating she was toxic when they arrived to her home put her on 15 L nonrebreather in route. Patient denies any sick contacts. Patient states he does have a productive cough with dark phlegm. Denies any chest pain. Patient that she is coughing so hard her ribs hurt. Patient denies any falls injuries trauma no swelling extremities no sore throat. Patient dates runny nose and earache. Patient here for evaluation.     ROS - see HPI, below listed is current ROS at time of my eval:  General:  No fevers, no chills, no weakness  Eyes:  No recent vison changes, no discharge  ENT: Positive for runny nose, earache, no sore throat, no nasal congestion, no hearing changes  Cardiovascular:  No chest pain, no palpitations  Respiratory: As of for shortness of breath, cough, no wheezing  Gastrointestinal:  No pain, no nausea, no vomiting, no diarrhea  Musculoskeletal:  No muscle pain, no joint pain  Skin:  No rash, no pruritis, no easy bruising  Neurologic:  No speech problems, no headache, no extremity numbness, no extremity tingling, no extremity weakness  Psychiatric:  No anxiety  Genitourinary:  No dysuria, no hematuria  Endocrine:  No unexpected weight gain, no unexpected weight loss  Extremities:  no edema, no pain    Past Medical History:   Diagnosis Date    Acute on chronic respiratory failure with hypoxia ([facility] [address] 2021    Arthritis     Asthma     CAD (coronary artery disease)     COPD (chronic obstructive pulmonary disease) ([facility])     H/O Doppler ultrasound (Bilateral Carotid) 2018    No hemodynamically significant stenosis noted in the internal carotid artery bilaterally. H/O echocardiogram 2017; 2020    Left ventricular systolic function is moderately depressed with EF 35-40%. Mild-Moderate Pulmonary HTN. History of nuclear stress test 2017; 11/3/2020    LVEF is low, EF 40%. Normal perfusion study.     Hyperlipidemia     Hypertension     Mitral regurgitation     Need for prophylactic vaccination against Streptococcus pneumoniae (pneumococcus) 2021    S/P CABG x 4 2015    [place] to LAD & Diag, SVG to Post lat RCA & Ramus     Past Surgical History:   Procedure Laterality Date    APPENDECTOMY      CARDIAC SURGERY      HYSTERECTOMY (CERVIX STATUS UNKNOWN)      INTRACAPSULAR CATARACT EXTRACTION Left 2019    EYE CATARACT EMULSIFICATION IOL IMPLANT performed by Thania Nunez MD at [address] [place] [address]       Family History   Problem Relation Age of Onset    Other Mother         copd, emphysema    Colon Cancer Sister     Other Brother         copd, agent orange    Other Brother         agent orange     Social History     Socioeconomic History    Marital status:      Spouse name: Not on file    Number of children: Not on file    Years of education: Not on file    Highest education level: Not on file   Occupational History    Not on file   Tobacco Use    Smoking status: Former     Packs/day: 1.00     Years: 40.00     Pack years: 40.00     Types: Cigarettes     Quit date: 5/15/2015     Years since quittin.2    Smokeless tobacco: Never   Vaping Use    Vaping Use: Never used   Substance and Sexual Activity    Alcohol use: No     Alcohol/week: 0.0 standard drinks    Drug use: No    Sexual activity: Yes Partners: Male   Other Topics Concern    Not on file   Social History Narrative    ** Merged History Encounter **          Social Determinants of Health     Financial Resource Strain: Not on file   Food Insecurity: Not on file   Transportation Needs: Not on file   Physical Activity: Not on file   Stress: Not on file   Social Connections: Not on file   Intimate Partner Violence: Not on file   Housing Stability: Not on file     Current Facility-Administered Medications   Medication Dose Route Frequency Provider Last Rate Last Admin    methylPREDNISolone sodium (SOLU-MEDROL) injection 125 mg  125 mg IntraVENous Once Karuna Adams, DO         Current Outpatient Medications   Medication Sig Dispense Refill    doxepin (SINEQUAN) 10 MG capsule TAKE 1 CAPSULE BY MOUTH NIGHTLY 30 capsule 11    lisinopril (PRINIVIL;ZESTRIL) 20 MG tablet Take 1 tablet by mouth in the morning. 90 tablet 3    albuterol sulfate HFA (PROVENTIL;VENTOLIN;PROAIR) 108 (90 Base) MCG/ACT inhaler Inhale 2 puffs into the lungs every 6 hours as needed for Wheezing 18 g 3    montelukast (SINGULAIR) 10 MG tablet Take 1 tablet by mouth in the morning.  30 tablet 3    isosorbide mononitrate (IMDUR) 60 MG extended release tablet Take 1/2 (one-half) tablet by mouth once daily 30 tablet 0    predniSONE (DELTASONE) 5 MG tablet TAKE 1 TABLET BY MOUTH ONCE DAILY 30 tablet 5    busPIRone (BUSPAR) 5 MG tablet Take 1 tablet by mouth twice daily 60 tablet 5    carvedilol (COREG) 12.5 MG tablet Take 0.5 tablets by mouth 2 times daily (with meals) 60 tablet 0    ipratropium-albuterol (DUONEB) 0.5-2.5 (3) MG/3ML SOLN nebulizer solution Take 3 mLs by nebulization 4 times daily 360 mL 5    guaiFENesin (MUCINEX) 600 MG extended release tablet Take 1 tablet by mouth 2 times daily 60 tablet 5    montelukast (SINGULAIR) 10 MG tablet Take 1 tablet by mouth once daily 30 tablet 11    ibandronate (BONIVA) 150 MG tablet Take 1 tablet by mouth every 30 days Take one (1) tablet once per month in the morning with a full glass of water, on an empty stomach, and do not take anything else by mouth or lie down for the next 30 minutes. 1 tablet 11    atorvastatin (LIPITOR) 40 MG tablet Take 1 tablet by mouth daily 90 tablet 3    Arformoterol Tartrate (BROVANA) 15 MCG/2ML NEBU Take 1 ampule by nebulization 2 times daily 120 mL 5    budesonide (PULMICORT) 0.5 MG/2ML nebulizer suspension Take 2 mLs by nebulization 2 times daily 30 ampule 5    nitroGLYCERIN (NITROSTAT) 0.4 MG SL tablet Place 1 tablet under the tongue every 5 minutes as needed for Chest pain 25 tablet 3    docusate sodium (COLACE) 100 MG capsule Take 100 mg by mouth daily as needed       aspirin 81 MG chewable tablet Take 1 tablet by mouth daily 30 tablet 3     Allergies   Allergen Reactions    Seasonal Itching       Nursing Notes Reviewed    Physical Exam:  Triage VS:    ED Triage Vitals   Enc Vitals Group      BP 08/17/22 2045 (!) 161/101      Heart Rate 08/17/22 2045 (!) 116      Resp 08/17/22 2045 28      Temp 08/17/22 2100 97.9 °F (36.6 °C)      Temp Source 08/17/22 2100 Tympanic      SpO2 08/17/22 2045 100 %      Weight --       Height --       Head Circumference --       Peak Flow --       Pain Score --       Pain Loc --       Pain Edu? --       Excl. in [address]? --      BP (!) 152/79   Pulse (!) 104   Temp 98.3 °F (36.8 °C) (Oral)   Resp 28   Ht 5' 5\" (1.651 m)   Wt 100 lb 8 oz (45.6 kg)   LMP  (LMP Unknown)   SpO2 97%   BMI 16.72 kg/m²     My pulse ox interpretation is - normal    General appearance:  No acute distress. Skin:  Warm. Dry. Eye:  Extraocular movements intact. Ears, nose, mouth and throat:  Oral mucosa dry, normal tympanic membranes bilaterally,  Neck:  Trachea midline. Extremity:  No swelling. Normal ROM     Heart: Sinus tachycardia, normal S1 & S2, no extra heart sounds.     Perfusion:  intact  Respiratory: Crackles in the lung bases bilaterally right greater than left, tight breath sounds, increased work of breathing, l  Abdominal:  Normal bowel sounds. Soft. Nontender. Non distended. Back:  No CVA tenderness to palpation     Neurological:  Alert and oriented times 3. No focal neuro deficits.              Psychiatric:  Appropriate    I have reviewed and interpreted all of the currently available lab results from this visit (if applicable):  Results for orders placed or performed during the hospital encounter of 08/17/22   COVID-19, Rapid    Specimen: Nasopharyngeal   Result Value Ref Range    SARS-CoV-2, NAAT NOT DETECTED NOT DETECTED   CBC with Auto Differential   Result Value Ref Range    WBC 16.9 (H) 4.0 - 10.5 K/CU MM    RBC 4.11 (L) 4.2 - 5.4 M/CU MM    Hemoglobin 12.9 12.5 - 16.0 GM/DL    Hematocrit 37.4 37 - 47 %    MCV 91.0 78 - 100 FL    MCH 31.4 (H) 27 - 31 PG    MCHC 34.5 32.0 - 36.0 %    RDW 11.8 11.7 - 14.9 %    Platelets 960 (H) 833 - 440 K/CU MM    MPV 8.9 7.5 - 11.1 FL    Segs Relative 60.0 36 - 66 %    Lymphocytes % 28.0 24 - 44 %    Monocytes % 12.0 (H) 0 - 4 %    Segs Absolute 10.2 K/CU MM    Lymphocytes Absolute 4.7 K/CU MM    Monocytes Absolute 2.0 K/CU MM    Differential Type MANUAL DIFFERENTIAL     RBC Morphology RBC MORPHOLOGY NORMAL     PLT Morphology PLATELETS NORMAL IN NUMBER AND SIZE    Comprehensive Metabolic Panel w/ Reflex to MG   Result Value Ref Range    Sodium 130 (L) 135 - 145 MMOL/L    Potassium 4.0 3.5 - 5.1 MMOL/L    Chloride 92 (L) 99 - 110 mMol/L    CO2 26 21 - 32 MMOL/L    BUN 22 6 - 23 MG/DL    Creatinine 0.4 (L) 0.6 - 1.1 MG/DL    Glucose 107 (H) 70 - 99 MG/DL    Calcium 8.9 8.3 - 10.6 MG/DL    Albumin 3.3 (L) 3.4 - 5.0 GM/DL    Total Protein 7.1 6.4 - 8.2 GM/DL    Total Bilirubin 0.4 0.0 - 1.0 MG/DL    ALT 11 10 - 40 U/L    AST 14 (L) 15 - 37 IU/L    Alkaline Phosphatase 67 40 - 129 IU/L    GFR Non-African American >60 >60 mL/min/1.73m2    GFR African American >60 >60 mL/min/1.73m2    Anion Gap 12 4 - 16   Lipase   Result Value Ref Range    Lipase 10 (L) 13 - 60 IU/L   Troponin   Result Value Ref Range    Troponin T <0.010 <0.01 NG/ML   Brain Natriuretic Peptide   Result Value Ref Range    Pro-.1 (H) <300 PG/ML   POCT Venous   Result Value Ref Range    pH, Derrick 7.42 7.32 - 7.42    pCO2, Derrick 40.5 38 - 52 mmHG    pO2, Derrick 28.1 28 - 48 mmHG    Base Exc, Mixed 1.5 0 - 2.3    Base Excess HIDE 0 - 2.4    HCO3, Venous 26.2 (H) 19 - 25 MMOL/L    O2 Sat, Derrick 54.2 50 - 70 %    CO2 Content 27.4 (H) 19 - 24 MMOL/L    Source: Venous    EKG 12 Lead   Result Value Ref Range    Ventricular Rate 104 BPM    Atrial Rate 104 BPM    P-R Interval 208 ms    QRS Duration 142 ms    Q-T Interval 428 ms    QTc Calculation (Bazett) 562 ms    R Axis -65 degrees    T Axis 44 degrees    Diagnosis       Sinus tachycardia with fusion complexes  Left bundle branch block  Abnormal ECG  When compared with ECG of 11-OCT-2021 14:56,  fusion complexes are now present  ST elevation now present in Lateral leads  T wave inversion no longer evident in Lateral leads  QT has lengthened        Radiographs (if obtained):  Radiologist's Report Reviewed:  XR CHEST PORTABLE   Final Result   Cardiac silhouette is stable. Atherosclerosis of the aorta. Postoperative   changes of median sternotomy. Chronic interstitial changes of the lungs with   increased airspace opacities at the right mid and lower lung fields and left   lower lobe which may reflect atelectasis versus infiltrate. No pleural   effusion. No pneumothorax. Osseous structures appear intact. RECOMMENDATION:   1. Chronic lung changes with increased airspace opacities at the right mid   lung and bilateral lower lobes which may reflect atelectasis versus   infiltrates. EKG (if obtained): (All EKG's are interpreted by myself in the absence of a cardiologist)      MDM:  Patient presents emerged department short of breath difficulty breathing. Patient arrives on a nonrebreather per EMS.   Patient placed on cardiac monitor patient was placed back to 2 L of oxygen nasal cannula which is her home oxygen. Patient ordered DuoNeb albuterol nebulizer Solu-Medrol laboratory studies and chest x-ray. On physical exam patient crackles in the lung bases tight breath sounds in the upper lungs with cough. Patient venous blood gas within normal limits. COVID testing negative. Patient elevated white count of 16.9. Patient with normal hemoglobin and highly elevated platelets. Patient sodium down to 130. Patient on potassium calcium and kidney function. Patient glucose 107. Patient with normal liver enzymes lipase negative troponin. Patient BNP of 419. Chest x-ray showing increased opacities right mid and lower lung and left lower lobe atelectasis versus infiltrates. Patient white count short of breath hypoxia I did treat her for sepsis. Patient ordered Rocephin azithromycin did order lactic blood cultures and IV fluids. Hospitalist was consulted will be admitting patient to their service. Clinical Impression:  1. Pneumonia of both lower lobes due to infectious organism    2. COPD exacerbation ([address]    3. Hypoxia          ED Provider Disposition Time  DISPOSITION  10:30 PM        Comment: Please note this report has been produced using speech recognition software and may contain errors related to that system including errors in grammar, punctuation, and spelling, as well as words and phrases that may be inappropriate. Efforts were made to edit the dictations.         Chidi Hurd DO  08/18/22 2620

## 2022-08-18 NOTE — H&P
V2.0  History and Physical      Name:  Cosmo Reece /Age/Sex: 1950  (67 y.o. female)   MRN & CSN:  [id number] & [id number] Encounter Date/Time: 2022 11:21 PM EDT   Location:   PCP: Ramirez Valle, [facility] Day: 1    Assessment and Plan:   Cosmo Reece is a 67 y.o. female with a pmh of CAD, COPD, arthritis, hypertension, hyperlipidemia, mitral regurgitation. Who presents with Shortness of breath related to  COPD exacerbation [facility]    Hospital Problems             Last Modified POA    * (Principal) COPD exacerbation ([facility] [address] 2022 Yes    Acute on chronic respiratory failure with hypoxia ([facility] [address] 2022 Yes    Pneumonia 2022 Yes     #  COPD Exacerbation with hypoxia      Shortness of breath, productive cough, expiratory wheezing      On home 2 L oxygen chronically, requiring 10 L non re breather per EMS    Requiring 4L in ED for oxygen saturation above 96%    Oxygen supplement, breathing treatment of albuterol/Duoneb, steroid    Wean oxygen as tolerated, GI and DVT prophylaxis    # Acute on chronic hypoxic respiratory failure    Oxygen saturation reported of  80% on room air at patient home     Requiring 10L non re breather  per EMS for oxygen saturation of 94%   Currently on 4 L N/C , for oxygen saturation  greater   than 95% . wean as tolerated for home oxygen of 2L chronically      # Pneumonia (CAP)     CURB -65 score of 2     Fatigue, Cough, Shortness of breath    Chest X-ray      Chronic interstitial changes of the lungs with   increased airspace opacities at the right mid and lower lung fields and left   lower lobe which may reflect atelectasis versus infiltrate. No pleural   effusion. No pneumothorax. Osseous structures appear intact. Mild hydration, culture, strep. Pneumo. Legionella    Antibiotics.        # SIRS   Criteria:  WBC 16.9, , RR 28     May be related to patient COPD and suspected  Pneumonia    Treat underlining condition and monitor vitals ans lab trends    #  Hyponatremia      Na 130      Has history of chronic hyponatremia      Patient endorsed 2 days of reduced oral intake     May lead to dehydration, fluid and monitor am lab for trend     Replace st 8mmol/L in 24hrs, may need urine osmolarity,Na and electrolyte pending trend from lab. # Tachycardia     , may be related to shortness of breath for COPD     Dehydration from poor oral intake, monitor rate with management of presenting diagnosis     # Hyperlipidemia     On home atorvastatin    # Hypertension    Takes coreg     # CAD (coronary artery disease) S/P CABG x 4  Patient currently asymptomatic, resume home regimen and monitor closely  with telemetry    # Depression      Patient prescribed buspar and doxepin ( hold doxepin that can cause SIADH)    Disposition:   Current Living situation:  Home  Expected Disposition: Home  Estimated D/C: 12 to 72 Hrs    Diet regular   DVT Prophylaxis [x] Lovenox, []  Heparin, [] SCDs, [] Ambulation,  [] Eliquis, [] Xarelto, [] Coumadin   Code Status Full   Surrogate Decision Maker/ POA  Spouse     History from:      Information obtained from patient with spouse on bedside and ED provider. History of Present Illness:     Chief Complaint:  Shortness of breath related to COPD exacerbation ([facility] [address]  Solomon Hill is a 67 y.o. female with pmh of CAD, hypertension, hyperlipidemia, COPD, and arthritis. Patient is a former smoker who stated she quit 6 years ago. Who presents with three days history of shortness of breath associated with productive cough and fatigue. Patient is on 2 L of oxygen chronically at home. She reported being hypoxic for which EMS was called. She was placed on 10 L nonrebreather and brought to the ER. On arrival patient was tachycardic requiring supplemental oxygen for oxygen saturation above 93%. She denies chest pain headache and abdominal pain. She also denied fever and thus productive cough and fatigue.   Imaging studies in the ER with x-ray shows infiltrates suggestive of pneumonia. Patient has been admitted for further evaluation and management of a COPD exacerbation, hyponatremia, pneumonia, and other comorbidities identified during her presentation. Review of Systems: Need 10 Elements   Review of Systems   Constitutional:  Positive for fatigue. HENT: Negative. Eyes: Negative. Respiratory:  Positive for cough, shortness of breath and wheezing. Cardiovascular: Negative. Gastrointestinal: Negative. Endocrine: Negative. Genitourinary: Negative. Musculoskeletal: Negative. Allergic/Immunologic: Negative. Neurological: Negative. Psychiatric/Behavioral: Negative. Objective:   No intake or output data in the 24 hours ending 08/17/22 2321   Vitals:   Vitals:    08/17/22 2045 08/17/22 2100 08/17/22 2111 08/17/22 2130   BP: (!) 161/101   (!) 149/72   Pulse: (!) 116      Resp: 28      Temp:  97.9 °F (36.6 °C)     TempSrc:  Tympanic     SpO2: 100%  97% 100%   Weight: 103 lb (46.7 kg)      Height: 5' 5\" (1.651 m)          Medications Prior to Admission     Prior to Admission medications    Medication Sig Start Date End Date Taking? Authorizing Provider   doxepin (SINEQUAN) 10 MG capsule TAKE 1 CAPSULE BY MOUTH NIGHTLY 8/3/22   Wong Swift PA-C   lisinopril (PRINIVIL;ZESTRIL) 20 MG tablet Take 1 tablet by mouth in the morning. 8/2/22   Ashok Vincent MD   albuterol sulfate HFA (PROVENTIL;VENTOLIN;PROAIR) 108 (90 Base) MCG/ACT inhaler Inhale 2 puffs into the lungs every 6 hours as needed for Wheezing 7/26/22   Karol Mesa MD   montelukast (SINGULAIR) 10 MG tablet Take 1 tablet by mouth in the morning.  7/26/22   Karol Mesa MD   isosorbide mononitrate (IMDUR) 60 MG extended release tablet Take 1/2 (one-half) tablet by mouth once daily 7/19/22   Ashok Vincent MD   predniSONE (DELTASONE) 5 MG tablet TAKE 1 TABLET BY MOUTH ONCE DAILY 7/12/22   Wong Swift PA-C   busPIRone (BUSPAR) 5 MG tablet Take 1 tablet by mouth twice daily 7/12/22   Samreen King PA-C   carvedilol (COREG) 12.5 MG tablet Take 0.5 tablets by mouth 2 times daily (with meals) 6/20/22   CHEPE Ochoa NP   ipratropium-albuterol (DUONEB) 0.5-2.5 (3) MG/3ML SOLN nebulizer solution Take 3 mLs by nebulization 4 times daily 4/20/22   Emilia Gomez MD   guaiFENesin (MUCINEX) 600 MG extended release tablet Take 1 tablet by mouth 2 times daily 4/20/22 10/17/22  Antonio Laws MD   montelukast (SINGULAIR) 10 MG tablet Take 1 tablet by mouth once daily 2/15/22   Samreen King PA-C   ibandronate (BONIVA) 150 MG tablet Take 1 tablet by mouth every 30 days Take one (1) tablet once per month in the morning with a full glass of water, on an empty stomach, and do not take anything else by mouth or lie down for the next 30 minutes. 8/17/21   Samreen Knig PA-C   atorvastatin (LIPITOR) 40 MG tablet Take 1 tablet by mouth daily 5/12/21 5/12/22  Anna Tervino MD   Arformoterol Tartrate [facility]) 15 MCG/2ML NEBU Take 1 ampule by nebulization 2 times daily 1/25/21   CHEPE Houston CNP   budesonide (PULMICORT) 0.5 MG/2ML nebulizer suspension Take 2 mLs by nebulization 2 times daily 1/25/21   CHEPE Houston CNP   nitroGLYCERIN (NITROSTAT) 0.4 MG SL tablet Place 1 tablet under the tongue every 5 minutes as needed for Chest pain 1/5/21   Samreen King PA-C   docusate sodium (COLACE) 100 MG capsule Take 100 mg by mouth daily as needed     Historical Provider, MD   aspirin 81 MG chewable tablet Take 1 tablet by mouth daily 6/3/15   Alta Ni MD     Physical Exam: Need 8 Elements   Physical Exam   General appearance:  Fatigue alert,  laying on bed appears  stated age,  cooperative, fatigued,  non in acute distress   Head: Normocephalic, without obvious abnormality, atraumatic  Eyes: conjunctivae/corneas clear. PERRL, EOM's intact. Fundi benign.   Ears: normal TM's and external ear canals both ears  Nose: Nares normal. Septum midline. Mucosa normal. No drainage or sinus tenderness. Throat: Dry lips with dry oral mucosa  Neck: no adenopathy, no carotid bruit, no JVD, supple, symmetrical, trachea midline and thyroid not enlarged, symmetric, no tenderness/mass/nodules  Back: symmetric, no curvature. ROM normal. No CVA tenderness. Lungs: Diminished air entry on lower lobes ,wheezes appreciated   Cardio. Tachycardic  rate and rhythm, S1, S2 normal, no murmur, click, rub or gallop, no lower extremity edema   Abdomen: soft, non-tender; non distended  bowel sounds normal in all four quadrants ; no masses,  no organomegaly appreciated   Extremities: extremities normal, atraumatic, no cyanosis or edema  Pulses: 2+ and symmetric  Skin: Skin color, texture, turgor normal. No rashes or lesions  Neurologic: Alert and oriented X 3, normal strength and tone. Normal symmetric reflexes. Normal coordination and gait    Past Medical History:   PMHx   Past Medical History:   Diagnosis Date    Acute on chronic respiratory failure with hypoxia ([address] 7/19/2021    Arthritis     Asthma     CAD (coronary artery disease)     COPD (chronic obstructive pulmonary disease) ([facility])     H/O Doppler ultrasound (Bilateral Carotid) 04/26/2018    No hemodynamically significant stenosis noted in the internal carotid artery bilaterally. H/O echocardiogram 03/07/2017; 12/8/2020    Left ventricular systolic function is moderately depressed with EF 35-40%. Mild-Moderate Pulmonary HTN. History of nuclear stress test 03/07/2017; 11/3/2020    LVEF is low, EF 40%. Normal perfusion study. Hyperlipidemia     Hypertension     Mitral regurgitation     Need for prophylactic vaccination against Streptococcus pneumoniae (pneumococcus) 6/7/2021    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    [place] to LAD & Diag, SVG to Post lat RCA & Ramus     PSHX:  has a past surgical history that includes knee surgery; sinus surgery; Appendectomy; Hysterectomy;  Tonsillectomy; Cardiac surgery; and Intracapsular cataract extraction (Left, 2019). Allergies: Allergies   Allergen Reactions    Seasonal Itching     Fam HX:  family history includes Colon Cancer in her sister; Other in her brother, brother, and mother.   Soc HX:   Social History     Socioeconomic History    Marital status:    Tobacco Use    Smoking status: Former     Packs/day: 1.00     Years: 40.00     Pack years: 40.00     Types: Cigarettes     Quit date: 5/15/2015     Years since quittin.2    Smokeless tobacco: Never   Vaping Use    Vaping Use: Never used   Substance and Sexual Activity    Alcohol use: No     Alcohol/week: 0.0 standard drinks    Drug use: No    Sexual activity: Yes     Partners: Male   Social History Narrative    ** Merged History Encounter **            Medications:   Medications:    sodium chloride flush  5-40 mL IntraVENous 2 times per day    cefTRIAXone (ROCEPHIN) IV  1,000 mg IntraVENous Once    And    azithromycin  500 mg IntraVENous Once    0.9 % sodium chloride  30 mL/kg IntraVENous Once      Infusions:    sodium chloride       PRN Meds: sodium chloride flush, 5-40 mL, PRN  sodium chloride, , PRN      Labs      CBC:   Recent Labs     22  2100   WBC 16.9*   HGB 12.9   *     BMP:    Recent Labs     22  2100   *   K 4.0   CL 92*   CO2 26   BUN 22   CREATININE 0.4*   GLUCOSE 107*     Hepatic:   Recent Labs     22  2100   AST 14*   ALT 11   BILITOT 0.4   ALKPHOS 67     Lipids:   Lab Results   Component Value Date/Time    CHOL 147 2021 03:51 PM    HDL 37 2021 03:51 PM    TRIG 72 2021 03:51 PM     Hemoglobin A1C:   Lab Results   Component Value Date/Time    LABA1C 5.0 10/05/2021 01:54 PM     TSH: No results found for: TSH  Troponin:   Lab Results   Component Value Date/Time    TROPONINT <0.010 2022 09:00 PM    TROPONINT <0.010 10/11/2021 03:09 PM    TROPONINT <0.010 2020 02:30 PM     Lactic Acid: No results for input(s): LACTA in the last 72 hours. BNP:   Recent Labs     08/17/22  2100   PROBNP 419.1*     UA:  Lab Results   Component Value Date/Time    NITRU NEGATIVE 07/19/2021 01:19 PM    COLORU yellow 03/24/2022 12:33 PM    COLORU YELLOW 07/19/2021 01:19 PM    PHUR 7.0 03/24/2022 12:33 PM    WBCUA NEGATIVE 07/19/2021 01:19 PM    RBCUA 1 07/19/2021 01:19 PM    MUCUS NEGATIVE 12/29/2016 09:00 PM    BACTERIA NEGATIVE 07/19/2021 01:19 PM    CLARITYU clear 03/24/2022 12:33 PM    CLARITYU CLEAR 07/19/2021 01:19 PM    SPECGRAV 1.020 03/24/2022 12:33 PM    SPECGRAV 1.010 07/19/2021 01:19 PM    LEUKOCYTESUR small 03/24/2022 12:33 PM    LEUKOCYTESUR NEGATIVE 07/19/2021 01:19 PM    UROBILINOGEN 0.2 07/19/2021 01:19 PM    BILIRUBINUR neg 03/24/2022 12:33 PM    BLOODU neg 03/24/2022 12:33 PM    BLOODU NEGATIVE 07/19/2021 01:19 PM    GLUCOSEU neg 03/24/2022 12:33 PM    KETUA neg 03/24/2022 12:33 PM    KETUA NEGATIVE 07/19/2021 01:19 PM     Urine Cultures:   Lab Results   Component Value Date/Time    LABURIN  03/24/2022 12:35 PM     75,000 CFU/ml  Susceptibility testing of penicillin and other beta lactams is  not necessary for beta hemolytic Streptococci since resistant  strains have not been identified. (CLSI M100)       Blood Cultures: No results found for: BC  No results found for: BLOODCULT2  Organism:   Lab Results   Component Value Date/Time    ORG Strep agalactiae (Beta Strep Group B) 03/24/2022 12:35 PM       Imaging/Diagnostics Last 24 Hours   XR CHEST PORTABLE    Result Date: 8/17/2022  EXAMINATION: ONE XRAY VIEW OF THE CHEST 8/17/2022 9:01 pm COMPARISON: Chest x-ray April 25, 2022 HISTORY: ORDERING SYSTEM PROVIDED HISTORY: sob TECHNOLOGIST PROVIDED HISTORY: Reason for exam:->sob Reason for Exam: sob     Cardiac silhouette is stable. Atherosclerosis of the aorta. Postoperative changes of median sternotomy.   Chronic interstitial changes of the lungs with increased airspace opacities at the right mid and lower lung fields and left lower lobe which may reflect atelectasis versus infiltrate. No pleural effusion. No pneumothorax. Osseous structures appear intact. RECOMMENDATION: 1. Chronic lung changes with increased airspace opacities at the right mid lung and bilateral lower lobes which may reflect atelectasis versus infiltrates.        Personally reviewed Lab Studies, Imaging, and discussed case with  Telemedicine attending provider Dr. Violetta Barney    Electronically signed by CHEPE Yao CNP on 8/17/2022 at 11:21 PM

## 2022-08-18 NOTE — PROGRESS NOTES
The patient's daily home medications were updated in the chart's Home Medication List after being reviewed with:   [] the patient. [] the patient's facility MAR. [x] the patient's family or caregiver.  [] the patient's pharmacy. Called pts . Went over pts medications with her .

## 2022-08-18 NOTE — PLAN OF CARE
Problem: Discharge Planning  Goal: Discharge to home or other facility with appropriate resources  8/18/2022 0033 by Em Castillo RN  Outcome: Progressing  8/18/2022 0031 by Em Castillo RN  Outcome: Progressing     Problem: Pain  Goal: Verbalizes/displays adequate comfort level or baseline comfort level  8/18/2022 0033 by Em Castillo RN  Outcome: Progressing  8/18/2022 0031 by Em Castillo RN  Outcome: Progressing     Problem: Safety - Adult  Goal: Free from fall injury  8/18/2022 0033 by Em Castillo RN  Outcome: Progressing  8/18/2022 0031 by Em Castillo RN  Outcome: Progressing     Problem: Cardiovascular - Adult  Goal: Maintains optimal cardiac output and hemodynamic stability  8/18/2022 0033 by Em Castillo RN  Outcome: Progressing  8/18/2022 0031 by Em Castillo RN  Outcome: Progressing  Goal: Absence of cardiac dysrhythmias or at baseline  8/18/2022 0033 by Em Castillo RN  Outcome: Progressing  8/18/2022 0031 by Em Castillo RN  Outcome: Progressing     Problem: Chronic Conditions and Co-morbidities  Goal: Patient's chronic conditions and co-morbidity symptoms are monitored and maintained or improved  8/18/2022 0033 by Em Castillo RN  Outcome: Progressing  8/18/2022 0031 by Em Castillo RN  Outcome: Progressing

## 2022-08-19 VITALS
RESPIRATION RATE: 22 BRPM | WEIGHT: 100.5 LBS | BODY MASS INDEX: 16.75 KG/M2 | OXYGEN SATURATION: 98 % | HEART RATE: 89 BPM | DIASTOLIC BLOOD PRESSURE: 61 MMHG | HEIGHT: 65 IN | SYSTOLIC BLOOD PRESSURE: 123 MMHG | TEMPERATURE: 98 F

## 2022-08-19 LAB
ALBUMIN SERPL-MCNC: 3.2 GM/DL (ref 3.4–5)
ALP BLD-CCNC: 52 IU/L (ref 40–129)
ALT SERPL-CCNC: 11 U/L (ref 10–40)
ANION GAP SERPL CALCULATED.3IONS-SCNC: 8 MMOL/L (ref 4–16)
AST SERPL-CCNC: 12 IU/L (ref 15–37)
BASOPHILS ABSOLUTE: 0 K/CU MM
BASOPHILS RELATIVE PERCENT: 0.3 % (ref 0–1)
BILIRUB SERPL-MCNC: 0.2 MG/DL (ref 0–1)
BUN BLDV-MCNC: 13 MG/DL (ref 6–23)
CALCIUM SERPL-MCNC: 8.6 MG/DL (ref 8.3–10.6)
CHLORIDE BLD-SCNC: 92 MMOL/L (ref 99–110)
CO2: 29 MMOL/L (ref 21–32)
CREAT SERPL-MCNC: 0.4 MG/DL (ref 0.6–1.1)
DIFFERENTIAL TYPE: ABNORMAL
EOSINOPHILS ABSOLUTE: 0 K/CU MM
EOSINOPHILS RELATIVE PERCENT: 0.1 % (ref 0–3)
GFR AFRICAN AMERICAN: >60 ML/MIN/1.73M2
GFR NON-AFRICAN AMERICAN: >60 ML/MIN/1.73M2
GLUCOSE BLD-MCNC: 102 MG/DL (ref 70–99)
HCT VFR BLD CALC: 33.3 % (ref 37–47)
HEMOGLOBIN: 11.1 GM/DL (ref 12.5–16)
IMMATURE NEUTROPHIL %: 1.2 % (ref 0–0.43)
LYMPHOCYTES ABSOLUTE: 4 K/CU MM
LYMPHOCYTES RELATIVE PERCENT: 25.3 % (ref 24–44)
MCH RBC QN AUTO: 30.6 PG (ref 27–31)
MCHC RBC AUTO-ENTMCNC: 33.3 % (ref 32–36)
MCV RBC AUTO: 91.7 FL (ref 78–100)
MONOCYTES ABSOLUTE: 1.8 K/CU MM
MONOCYTES RELATIVE PERCENT: 11.5 % (ref 0–4)
PDW BLD-RTO: 11.9 % (ref 11.7–14.9)
PLATELET # BLD: 565 K/CU MM (ref 140–440)
PMV BLD AUTO: 9.2 FL (ref 7.5–11.1)
POTASSIUM SERPL-SCNC: 3.9 MMOL/L (ref 3.5–5.1)
RBC # BLD: 3.63 M/CU MM (ref 4.2–5.4)
SEGMENTED NEUTROPHILS ABSOLUTE COUNT: 9.6 K/CU MM
SEGMENTED NEUTROPHILS RELATIVE PERCENT: 61.6 % (ref 36–66)
SODIUM BLD-SCNC: 129 MMOL/L (ref 135–145)
TOTAL IMMATURE NEUTOROPHIL: 0.19 K/CU MM
TOTAL PROTEIN: 6.5 GM/DL (ref 6.4–8.2)
WBC # BLD: 15.6 K/CU MM (ref 4–10.5)

## 2022-08-19 PROCEDURE — 6370000000 HC RX 637 (ALT 250 FOR IP): Performed by: NURSE PRACTITIONER

## 2022-08-19 PROCEDURE — 94761 N-INVAS EAR/PLS OXIMETRY MLT: CPT

## 2022-08-19 PROCEDURE — 2700000000 HC OXYGEN THERAPY PER DAY

## 2022-08-19 PROCEDURE — 2580000003 HC RX 258: Performed by: NURSE PRACTITIONER

## 2022-08-19 PROCEDURE — 85025 COMPLETE CBC W/AUTO DIFF WBC: CPT

## 2022-08-19 PROCEDURE — 6360000002 HC RX W HCPCS: Performed by: NURSE PRACTITIONER

## 2022-08-19 PROCEDURE — 36415 COLL VENOUS BLD VENIPUNCTURE: CPT

## 2022-08-19 PROCEDURE — 80053 COMPREHEN METABOLIC PANEL: CPT

## 2022-08-19 PROCEDURE — 94640 AIRWAY INHALATION TREATMENT: CPT

## 2022-08-19 PROCEDURE — 6370000000 HC RX 637 (ALT 250 FOR IP): Performed by: HOSPITALIST

## 2022-08-19 RX ORDER — IBANDRONATE SODIUM 150 MG/1
150 TABLET, FILM COATED ORAL
Qty: 1 TABLET | Refills: 11 | Status: SHIPPED | OUTPATIENT
Start: 2022-08-19

## 2022-08-19 RX ORDER — DOXYCYCLINE HYCLATE 100 MG
100 TABLET ORAL 2 TIMES DAILY
Qty: 14 TABLET | Refills: 0 | Status: SHIPPED | OUTPATIENT
Start: 2022-08-19 | End: 2022-08-29 | Stop reason: SDUPTHER

## 2022-08-19 RX ORDER — PREDNISONE 10 MG/1
TABLET ORAL
Qty: 42 TABLET | Refills: 0 | Status: SHIPPED | OUTPATIENT
Start: 2022-08-19 | End: 2022-09-08

## 2022-08-19 RX ADMIN — ENOXAPARIN SODIUM 30 MG: 100 INJECTION SUBCUTANEOUS at 08:37

## 2022-08-19 RX ADMIN — CEFTRIAXONE SODIUM 1000 MG: 1 INJECTION, POWDER, FOR SOLUTION INTRAMUSCULAR; INTRAVENOUS at 09:49

## 2022-08-19 RX ADMIN — IPRATROPIUM BROMIDE AND ALBUTEROL SULFATE 1 AMPULE: .5; 3 SOLUTION RESPIRATORY (INHALATION) at 07:50

## 2022-08-19 RX ADMIN — SODIUM CHLORIDE, PRESERVATIVE FREE 10 ML: 5 INJECTION INTRAVENOUS at 08:38

## 2022-08-19 RX ADMIN — ASPIRIN 81 MG: 81 TABLET, COATED ORAL at 08:37

## 2022-08-19 RX ADMIN — BUSPIRONE HYDROCHLORIDE 5 MG: 5 TABLET ORAL at 08:37

## 2022-08-19 RX ADMIN — AZITHROMYCIN MONOHYDRATE 500 MG: 500 INJECTION, POWDER, LYOPHILIZED, FOR SOLUTION INTRAVENOUS at 08:36

## 2022-08-19 RX ADMIN — GUAIFENESIN 600 MG: 600 TABLET, EXTENDED RELEASE ORAL at 08:37

## 2022-08-19 RX ADMIN — PREDNISONE 40 MG: 20 TABLET ORAL at 08:37

## 2022-08-19 RX ADMIN — FORMOTEROL FUMARATE DIHYDRATE 20 MCG: 20 SOLUTION RESPIRATORY (INHALATION) at 07:50

## 2022-08-19 RX ADMIN — ISOSORBIDE MONONITRATE 30 MG: 30 TABLET, EXTENDED RELEASE ORAL at 08:37

## 2022-08-19 RX ADMIN — CARVEDILOL 6.25 MG: 6.25 TABLET, FILM COATED ORAL at 08:37

## 2022-08-19 ASSESSMENT — PAIN SCALES - GENERAL
PAINLEVEL_OUTOF10: 0

## 2022-08-19 NOTE — PLAN OF CARE
Problem: Discharge Planning  Goal: Discharge to home or other facility with appropriate resources  Outcome: Progressing     Problem: Pain  Goal: Verbalizes/displays adequate comfort level or baseline comfort level  Outcome: Progressing     Problem: Safety - Adult  Goal: Free from fall injury  Outcome: Progressing     Problem: Cardiovascular - Adult  Goal: Maintains optimal cardiac output and hemodynamic stability  Outcome: Progressing  Goal: Absence of cardiac dysrhythmias or at baseline  Outcome: Progressing     Problem: Chronic Conditions and Co-morbidities  Goal: Patient's chronic conditions and co-morbidity symptoms are monitored and maintained or improved  Outcome: Progressing     Problem: Nutrition Deficit:  Goal: Optimize nutritional status  Outcome: Progressing

## 2022-08-19 NOTE — PLAN OF CARE
Problem: Discharge Planning  Goal: Discharge to home or other facility with appropriate resources  8/19/2022 1040 by Jose Wolfe RN  Outcome: Adequate for Discharge  Flowsheets (Taken 8/19/2022 0827)  Discharge to home or other facility with appropriate resources: Identify barriers to discharge with patient and caregiver  8/19/2022 0445 by Krystin Briones RN  Outcome: Progressing     Problem: Pain  Goal: Verbalizes/displays adequate comfort level or baseline comfort level  8/19/2022 1040 by Jose Wolfe RN  Outcome: Adequate for Discharge  8/19/2022 0445 by Krystin Briones RN  Outcome: Progressing     Problem: Safety - Adult  Goal: Free from fall injury  8/19/2022 1040 by Jose Wolfe RN  Outcome: Adequate for Discharge  8/19/2022 0445 by Krystin Briones RN  Outcome: Progressing     Problem: Cardiovascular - Adult  Goal: Maintains optimal cardiac output and hemodynamic stability  8/19/2022 1040 by Jose Wolfe RN  Outcome: Adequate for Discharge  8/19/2022 0445 by Krystin Briones RN  Outcome: Progressing  Goal: Absence of cardiac dysrhythmias or at baseline  8/19/2022 1040 by Jose Wolfe RN  Outcome: Adequate for Discharge  8/19/2022 0445 by Krystin Briones RN  Outcome: Progressing     Problem: Chronic Conditions and Co-morbidities  Goal: Patient's chronic conditions and co-morbidity symptoms are monitored and maintained or improved  8/19/2022 1040 by Jose Wolfe RN  Outcome: Adequate for Discharge  Flowsheets (Taken 8/19/2022 0827)  Care Plan - Patient's Chronic Conditions and Co-Morbidity Symptoms are Monitored and Maintained or Improved: Monitor and assess patient's chronic conditions and comorbid symptoms for stability, deterioration, or improvement  8/19/2022 0445 by Krystin Briones RN  Outcome: Progressing     Problem: Nutrition Deficit:  Goal: Optimize nutritional status  8/19/2022 1040 by Jose Wolfe RN  Outcome: Adequate for Discharge  8/19/2022 0445 by Krystin Briones RN  Outcome: Progressing moderate assist (50% patients effort)/minimum assist (75% patients effort)

## 2022-08-19 NOTE — PROGRESS NOTES
Reviewed with patient status of her blood cultures. She is alert and oriented and tells me that she would like to be discharged AMA. Paperwork signed and documented. Staff providing wheelchair escort to [facility], family providing transportation to home.

## 2022-08-19 NOTE — DISCHARGE SUMMARY
V2.0  Discharge Summary    Name:  Vern Yepez /Age/Sex: 1950 (67 y.o. female)   Admit Date: 2022  Discharge Date: 22    MRN & CSN:  [id number] & [id number] Encounter Date and Time 22 10:57 AM EDT    Attending:  Radha Terry MD Discharging Provider: CHEPE Blake NP       Hospital Course:     Brief HPI:    Brief Problem Based Course:   Acute on chronic respiratory failure with hypoxia   Suspected pneumonia, continue with IV antibiotics, urine Legionella urine strep are pending. Procalcitonin is 0.049, and doubt pneumonia. SIRS, present on admission now resolved  Hyponatremia, stable, continue to monitor  Hyperlipidemia continue atorvastatin  Hypertension continue Coreg  History of CAD, no chest pain status post CABG x4 continue any current home medications  Depression continue BuSpar      The patient expressed appropriate understanding of, and agreement with the discharge recommendations, medications, and plan. Consults this admission:  IP CONSULT TO HOSPITALIST    Discharge Diagnosis:   COPD exacerbation [facility])        Discharge Instruction:   Follow up appointments:   Primary care physician: Samreen King PA-C within  weeks  Diet: regular diet   Activity: activity as tolerated  Disposition: Discharged to:   [x]Home, []HHC, []SNF, []Acute Rehab, []Hospice   Condition on discharge: Stable  Labs and Tests to be Followed up as an outpatient by PCP or Specialist: Routine follow-up    Discharge Medications:        Medication List        START taking these medications      doxycycline hyclate 100 MG tablet  Commonly known as: VIBRA-TABS  Take 1 tablet by mouth 2 times daily for 7 days            CHANGE how you take these medications      atorvastatin 10 MG tablet  Commonly known as: LIPITOR  What changed: Another medication with the same name was removed. Continue taking this medication, and follow the directions you see here.      predniSONE 10 MG tablet  Commonly known as: DELTASONE  Take 4 tablets by mouth daily for 4 days, THEN 3 tablets daily for 4 days, THEN 2 tablets daily for 4 days, THEN 1 tablet daily for 4 days, THEN 0.5 tablets daily for 4 days. Start taking on: August 19, 2022  What changed:   medication strength  See the new instructions. CONTINUE taking these medications      albuterol sulfate  (90 Base) MCG/ACT inhaler  Commonly known as: PROVENTIL;VENTOLIN;PROAIR  Inhale 2 puffs into the lungs every 6 hours as needed for Wheezing     Arformoterol Tartrate 15 MCG/2ML Nebu  Commonly known as: BROVANA  Take 1 ampule by nebulization 2 times daily     aspirin 81 MG chewable tablet  Take 1 tablet by mouth daily     busPIRone 5 MG tablet  Commonly known as: BUSPAR  Take 1 tablet by mouth twice daily     carvedilol 12.5 MG tablet  Commonly known as: COREG  Take 0.5 tablets by mouth 2 times daily (with meals)     docusate sodium 100 MG capsule  Commonly known as: COLACE     doxepin 10 MG capsule  Commonly known as: SINEQUAN  TAKE 1 CAPSULE BY MOUTH NIGHTLY     guaiFENesin 600 MG extended release tablet  Commonly known as: MUCINEX  Take 1 tablet by mouth 2 times daily     ibandronate 150 MG tablet  Commonly known as: Boniva  Take 1 tablet by mouth every 30 days Take one (1) tablet once per month in the morning with a full glass of water, on an empty stomach, and do not take anything else by mouth or lie down for the next 30 minutes.      isosorbide mononitrate 60 MG extended release tablet  Commonly known as: IMDUR  Take 1/2 (one-half) tablet by mouth once daily     montelukast 10 MG tablet  Commonly known as: SINGULAIR  Take 1 tablet by mouth once daily     nitroGLYCERIN 0.4 MG SL tablet  Commonly known as: Nitrostat  Place 1 tablet under the tongue every 5 minutes as needed for Chest pain               Where to Get Your Medications        You can get these medications from any pharmacy    Bring a paper prescription for each of these medications  doxycycline hyclate 100 MG tablet  predniSONE 10 MG tablet        Objective Findings at Discharge:   /61   Pulse 89   Temp 98 °F (36.7 °C) (Oral)   Resp 22   Ht 5' 5\" (1.651 m)   Wt 100 lb 8 oz (45.6 kg)   LMP  (LMP Unknown)   SpO2 98%   BMI 16.72 kg/m²       Physical Exam:   General: NAD  Eyes: EOMI  ENT: neck supple  Cardiovascular: Regular rate. Respiratory: Faint scattered wheezes 2 L nasal cannula (baseline)  Gastrointestinal: Soft, non tender  Genitourinary: no suprapubic tenderness  Musculoskeletal: No edema  Skin: warm, dry  Neuro: Alert. Psych: Mood appropriate. Labs and Imaging   XR CHEST PORTABLE    Result Date: 8/17/2022  EXAMINATION: ONE XRAY VIEW OF THE CHEST 8/17/2022 9:01 pm COMPARISON: Chest x-ray April 25, 2022 HISTORY: ORDERING SYSTEM PROVIDED HISTORY: sob TECHNOLOGIST PROVIDED HISTORY: Reason for exam:->sob Reason for Exam: sob     Cardiac silhouette is stable. Atherosclerosis of the aorta. Postoperative changes of median sternotomy. Chronic interstitial changes of the lungs with increased airspace opacities at the right mid and lower lung fields and left lower lobe which may reflect atelectasis versus infiltrate. No pleural effusion. No pneumothorax. Osseous structures appear intact. RECOMMENDATION: 1. Chronic lung changes with increased airspace opacities at the right mid lung and bilateral lower lobes which may reflect atelectasis versus infiltrates.        CBC:   Recent Labs     08/17/22  2100 08/18/22  0515 08/19/22  0525   WBC 16.9* 13.6* 15.6*   HGB 12.9 11.9* 11.1*   * 469* 565*     BMP:    Recent Labs     08/17/22  2100 08/18/22  0515 08/19/22  0525   * 131* 129*   K 4.0 4.3 3.9   CL 92* 96* 92*   CO2 26 24 29   BUN 22 11 13   CREATININE 0.4* 0.3* 0.4*   GLUCOSE 107* 139* 102*     Hepatic:   Recent Labs     08/17/22  2100 08/18/22  0515 08/19/22  0525   AST 14* 18 12*   ALT 11 10 11   BILITOT 0.4 0.4 0.2   ALKPHOS 67 59 52     Lipids:   Lab Results Component Value Date/Time    CHOL 147 06/04/2021 03:51 PM    HDL 37 06/04/2021 03:51 PM    TRIG 72 06/04/2021 03:51 PM     Hemoglobin A1C:   Lab Results   Component Value Date/Time    LABA1C 5.0 10/05/2021 01:54 PM       Troponin:   Lab Results   Component Value Date/Time    TROPONINT <0.010 08/17/2022 09:00 PM    TROPONINT <0.010 10/11/2021 03:09 PM    TROPONINT <0.010 12/14/2020 02:30 PM       BNP:   Recent Labs     08/17/22  2100   PROBNP 419.1*     UA:  Lab Results   Component Value Date/Time    NITRU NEGATIVE 07/19/2021 01:19 PM    COLORU yellow 03/24/2022 12:33 PM    COLORU YELLOW 07/19/2021 01:19 PM    PHUR 7.0 03/24/2022 12:33 PM    WBCUA NEGATIVE 07/19/2021 01:19 PM    RBCUA 1 07/19/2021 01:19 PM    MUCUS NEGATIVE 12/29/2016 09:00 PM    BACTERIA NEGATIVE 07/19/2021 01:19 PM    CLARITYU clear 03/24/2022 12:33 PM    CLARITYU CLEAR 07/19/2021 01:19 PM    SPECGRAV 1.020 03/24/2022 12:33 PM    SPECGRAV 1.010 07/19/2021 01:19 PM    LEUKOCYTESUR small 03/24/2022 12:33 PM    LEUKOCYTESUR NEGATIVE 07/19/2021 01:19 PM    UROBILINOGEN 0.2 07/19/2021 01:19 PM    BILIRUBINUR neg 03/24/2022 12:33 PM    BLOODU neg 03/24/2022 12:33 PM    BLOODU NEGATIVE 07/19/2021 01:19 PM    GLUCOSEU neg 03/24/2022 12:33 PM    KETUA neg 03/24/2022 12:33 PM    KETUA NEGATIVE 07/19/2021 01:19 PM     Urine Cultures:   Lab Results   Component Value Date/Time    LABURIN  03/24/2022 12:35 PM     75,000 CFU/ml  Susceptibility testing of penicillin and other beta lactams is  not necessary for beta hemolytic Streptococci since resistant  strains have not been identified. (CLSI M100)         Organism:   Lab Results   Component Value Date/Time    ORG Strep agalactiae (Beta Strep Group B) 03/24/2022 12:35 PM       Time Spent Discharging patient 35 minutes.   Discharge plan discussed with my supervising physician Dr. Fabian Mcclellan    Electronically signed by CHEPE Martines NP on 8/19/2022 at 10:57 AM

## 2022-08-22 LAB
CULTURE: ABNORMAL
CULTURE: ABNORMAL
Lab: ABNORMAL
SPECIMEN: ABNORMAL

## 2022-08-22 RX ORDER — CARVEDILOL 12.5 MG/1
TABLET ORAL
Qty: 30 TABLET | Refills: 5 | Status: SHIPPED | OUTPATIENT
Start: 2022-08-22

## 2022-08-23 ENCOUNTER — TELEPHONE (OUTPATIENT)
Dept: FAMILY MEDICINE CLINIC | Age: 72
End: 2022-08-23

## 2022-08-23 LAB
CULTURE: NORMAL
Lab: NORMAL
SPECIMEN: NORMAL

## 2022-08-24 ENCOUNTER — TELEPHONE (OUTPATIENT)
Dept: FAMILY MEDICINE CLINIC | Age: 72
End: 2022-08-24

## 2022-08-24 NOTE — TELEPHONE ENCOUNTER
Called [facility] Saavedra patient  . Patient is wanting copies of lab results from when she was hospitalized last week.

## 2022-08-24 NOTE — TELEPHONE ENCOUNTER
[facility] Trans[facility] Initial Follow Up Call    Outreach made within 2 business days of discharge: Yes    Patient: Raymond Merlos Patient : 1950   MRN: [id number]  Reason for Admission: There are no discharge diagnoses documented for the most recent discharge. Discharge Date: 22       Spoke with: patient's spouse    Discharge department/facility: [facility] Patient Contact:  Was patient able to fill all prescriptions: Yes  Was patient instructed to bring all medications to the follow-up visit: Yes  Is patient taking all medications as directed in the discharge summary?  Yes  Does patient understand their discharge instructions: Yes  Does patient have questions or concerns that need addressed prior to 7-14 day follow up office visit: no    Scheduled appointment with PCP within 7-14 days    Follow Up  Future Appointments   Date Time Provider Judith Benítez   2022 12:45 PM Yordan Cantu MD [id number] , [place]

## 2022-08-25 NOTE — TELEPHONE ENCOUNTER
Patient is comig in for hospital f/u we can let the spouse know at that time regarding get copies of lab results from hospital

## 2022-08-26 ENCOUNTER — OFFICE VISIT (OUTPATIENT)
Dept: FAMILY MEDICINE CLINIC | Age: 72
End: 2022-08-26
Payer: MEDICARE

## 2022-08-26 VITALS
RESPIRATION RATE: 20 BRPM | TEMPERATURE: 98.3 F | OXYGEN SATURATION: 95 % | WEIGHT: 104 LBS | BODY MASS INDEX: 17.31 KG/M2 | SYSTOLIC BLOOD PRESSURE: 128 MMHG | DIASTOLIC BLOOD PRESSURE: 74 MMHG | HEART RATE: 95 BPM

## 2022-08-26 DIAGNOSIS — D72.825 BANDEMIA: ICD-10-CM

## 2022-08-26 DIAGNOSIS — E87.1 HYPONATREMIA: ICD-10-CM

## 2022-08-26 DIAGNOSIS — Z09 HOSPITAL DISCHARGE FOLLOW-UP: ICD-10-CM

## 2022-08-26 DIAGNOSIS — J44.1 COPD EXACERBATION (HCC): Primary | ICD-10-CM

## 2022-08-26 DIAGNOSIS — J96.21 ACUTE ON CHRONIC RESPIRATORY FAILURE WITH HYPOXIA (HCC): ICD-10-CM

## 2022-08-26 LAB
A/G RATIO: 1.5 (ref 1.1–2.2)
ALBUMIN SERPL-MCNC: 3.5 G/DL (ref 3.4–5)
ALP BLD-CCNC: 47 U/L (ref 40–129)
ALT SERPL-CCNC: 11 U/L (ref 10–40)
ANION GAP SERPL CALCULATED.3IONS-SCNC: 14 MMOL/L (ref 3–16)
AST SERPL-CCNC: 11 U/L (ref 15–37)
BASOPHILS ABSOLUTE: 0.1 K/UL (ref 0–0.2)
BASOPHILS RELATIVE PERCENT: 0.5 %
BILIRUB SERPL-MCNC: <0.2 MG/DL (ref 0–1)
BUN BLDV-MCNC: 14 MG/DL (ref 7–20)
CALCIUM SERPL-MCNC: 8.8 MG/DL (ref 8.3–10.6)
CHLORIDE BLD-SCNC: 93 MMOL/L (ref 99–110)
CO2: 24 MMOL/L (ref 21–32)
CREAT SERPL-MCNC: <0.5 MG/DL (ref 0.6–1.2)
EOSINOPHILS ABSOLUTE: 0 K/UL (ref 0–0.6)
EOSINOPHILS RELATIVE PERCENT: 0.1 %
GFR AFRICAN AMERICAN: >60
GFR NON-AFRICAN AMERICAN: >60
GLUCOSE BLD-MCNC: 110 MG/DL (ref 70–99)
HCT VFR BLD CALC: 36.1 % (ref 36–48)
HEMOGLOBIN: 12.2 G/DL (ref 12–16)
LYMPHOCYTES ABSOLUTE: 2 K/UL (ref 1–5.1)
LYMPHOCYTES RELATIVE PERCENT: 17.6 %
MCH RBC QN AUTO: 31.1 PG (ref 26–34)
MCHC RBC AUTO-ENTMCNC: 33.6 G/DL (ref 31–36)
MCV RBC AUTO: 92.5 FL (ref 80–100)
MONOCYTES ABSOLUTE: 0.3 K/UL (ref 0–1.3)
MONOCYTES RELATIVE PERCENT: 2.7 %
NEUTROPHILS ABSOLUTE: 9.2 K/UL (ref 1.7–7.7)
NEUTROPHILS RELATIVE PERCENT: 79.1 %
PDW BLD-RTO: 13.6 % (ref 12.4–15.4)
PLATELET # BLD: 548 K/UL (ref 135–450)
PMV BLD AUTO: 7.4 FL (ref 5–10.5)
POTASSIUM SERPL-SCNC: 5.1 MMOL/L (ref 3.5–5.1)
RBC # BLD: 3.91 M/UL (ref 4–5.2)
SODIUM BLD-SCNC: 131 MMOL/L (ref 136–145)
TOTAL PROTEIN: 5.8 G/DL (ref 6.4–8.2)
WBC # BLD: 11.7 K/UL (ref 4–11)

## 2022-08-26 PROCEDURE — 1111F DSCHRG MED/CURRENT MED MERGE: CPT | Performed by: PHYSICIAN ASSISTANT

## 2022-08-26 PROCEDURE — 99495 TRANSJ CARE MGMT MOD F2F 14D: CPT | Performed by: PHYSICIAN ASSISTANT

## 2022-08-26 RX ORDER — LISINOPRIL 20 MG/1
20 TABLET ORAL DAILY
COMMUNITY

## 2022-08-26 RX ORDER — PREDNISONE 1 MG/1
5 TABLET ORAL DAILY
COMMUNITY

## 2022-08-26 ASSESSMENT — PATIENT HEALTH QUESTIONNAIRE - PHQ9
SUM OF ALL RESPONSES TO PHQ QUESTIONS 1-9: 2
SUM OF ALL RESPONSES TO PHQ9 QUESTIONS 1 & 2: 2
2. FEELING DOWN, DEPRESSED OR HOPELESS: 1
1. LITTLE INTEREST OR PLEASURE IN DOING THINGS: 1

## 2022-08-29 DIAGNOSIS — E87.1 HYPONATREMIA: Primary | ICD-10-CM

## 2022-08-29 DIAGNOSIS — J18.9 PNEUMONIA OF LEFT LOWER LOBE DUE TO INFECTIOUS ORGANISM: ICD-10-CM

## 2022-08-29 RX ORDER — DOXYCYCLINE HYCLATE 100 MG
100 TABLET ORAL 2 TIMES DAILY
Qty: 14 TABLET | Refills: 0 | Status: SHIPPED | OUTPATIENT
Start: 2022-08-29 | End: 2022-09-05

## 2022-08-31 PROBLEM — E87.1 HYPONATREMIA: Status: ACTIVE | Noted: 2022-08-31

## 2022-08-31 PROBLEM — D72.825 BANDEMIA: Status: ACTIVE | Noted: 2022-08-31

## 2022-08-31 PROBLEM — Z09 HOSPITAL DISCHARGE FOLLOW-UP: Status: ACTIVE | Noted: 2022-08-31

## 2022-08-31 ASSESSMENT — ENCOUNTER SYMPTOMS
COUGH: 1
GASTROINTESTINAL NEGATIVE: 1
SHORTNESS OF BREATH: 1

## 2022-08-31 NOTE — PROGRESS NOTES
Post-Discharge Transitional Care Follow Up      Cosmo Every   YOB: 1950    Date of Office Visit:  8/26/2022  Date of Hospital Admission: 8/17/22  Date of Hospital Discharge: 8/19/22  Readmission Risk Score (high >=14%. Medium >=10%):Readmission Risk Score: 14.5      Care management risk score Rising risk (score 2-5) and Complex Care (Scores >=6): No Risk Score On File     Non face to face  following discharge, date last encounter closed (first attempt may have been earlier): 08/24/2022     Call initiated 2 business days of discharge: Yes     COPD exacerbation ([facility] [address]  -     Pulse Oximetry, Spot  Hyponatremia  -     Comprehensive Metabolic Panel; Future  Bandemia  -     CBC with Auto Differential; Future  Hospital discharge follow-up  -     [place] DISCHARGE MEDS RECONCILED W/ CURRENT OUTPATIENT MED LIST  Acute on chronic respiratory failure with hypoxia (HCC)  Assessment & Plan:   continue all meds as instructed at time of discharge, f/u with pulmonologist in next 1-2 weeks. Recheck CXR in 1 week, labs today. F/u here in 2-4 weeks-sooner if worse/prn      Medical Decision Making: moderate complexity  Return in 1 month (on 9/26/2022). Subjective:   HPI  Pt here today for hospital f/u. She was admitted with pneumonia/acute on chronic respiratory failure. She was discharged on doxy and steroids therapy. Hyponatremia and continued elevated white cell count noted on last labs prior to discharge. Pt states she insisted on being discharged sooner than what was recommended as she felt \"everything was too much and I couldn't take it all\". Will need repeat labs now. Inpatient course: Discharge summary reviewed- see chart. Interval history/Current status: Overall, pt states she is much improved since initial hospitalization. Here today with her , on continuous O2, compliant with all meds at this time.      Patient Active Problem List   Diagnosis    COPD exacerbation ([facility] [address]    Acute on chronic respiratory failure with hypoxia ([facility] [address]    Pneumonia of right upper lobe due to infectious organism    Chronic systolic heart failure ([facility] [address]    Severe malnutrition ([facility] [address]    Hyponatremia    [facility] discharge follow-up       Medications listed as ordered at the time of discharge from hospital     Medication List            Accurate as of August 26, 2022 11:59 PM. If you have any questions, ask your nurse or doctor. CHANGE how you take these medications      * predniSONE 5 MG tablet  Commonly known as: Bety Victoria  What changed: Another medication with the same name was changed. Make sure you understand how and when to take each. * predniSONE 10 MG tablet  Commonly known as: DELTASONE  Take 4 tablets by mouth daily for 4 days, THEN 3 tablets daily for 4 days, THEN 2 tablets daily for 4 days, THEN 1 tablet daily for 4 days, THEN 0.5 tablets daily for 4 days. Start taking on: August 19, 2022  What changed: See the new instructions. * This list has 2 medication(s) that are the same as other medications prescribed for you. Read the directions carefully, and ask your doctor or other care provider to review them with you.                 CONTINUE taking these medications      albuterol sulfate  (90 Base) MCG/ACT inhaler  Commonly known as: PROVENTIL;VENTOLIN;PROAIR  Inhale 2 puffs into the lungs every 6 hours as needed for Wheezing     Arformoterol Tartrate 15 MCG/2ML Nebu  Commonly known as: BROVANA  Take 1 ampule by nebulization 2 times daily     aspirin 81 MG chewable tablet  Take 1 tablet by mouth daily     atorvastatin 10 MG tablet  Commonly known as: LIPITOR     busPIRone 5 MG tablet  Commonly known as: BUSPAR  Take 1 tablet by mouth twice daily     carvedilol 12.5 MG tablet  Commonly known as: COREG  TAKE 1/2 (ONE-HALF) TABLET BY MOUTH TWICE DAILY WITH MEALS     docusate sodium 100 MG capsule  Commonly known as: COLACE     doxepin 10 MG capsule  Commonly known as: SINEQUAN  TAKE 1 CAPSULE BY MOUTH NIGHTLY     guaiFENesin 600 MG extended release tablet  Commonly known as: MUCINEX  Take 1 tablet by mouth 2 times daily     ibandronate 150 MG tablet  Commonly known as: Boniva  Take 1 tablet by mouth every 30 days Take one (1) tablet once per month in the morning with a full glass of water, on an empty stomach, and do not take anything else by mouth or lie down for the next 30 minutes. isosorbide mononitrate 60 MG extended release tablet  Commonly known as: IMDUR  Take 1/2 (one-half) tablet by mouth once daily     lisinopril 20 MG tablet  Commonly known as: PRINIVIL;ZESTRIL     montelukast 10 MG tablet  Commonly known as: SINGULAIR  Take 1 tablet by mouth once daily     MUCINEX DM PO     nitroGLYCERIN 0.4 MG SL tablet  Commonly known as: Nitrostat  Place 1 tablet under the tongue every 5 minutes as needed for Chest pain               Medications marked \"taking\" at this time  Outpatient Medications Marked as Taking for the 8/26/22 encounter (Office Visit) with Khadijah Bentley PA-C   Medication Sig Dispense Refill    lisinopril (PRINIVIL;ZESTRIL) 20 MG tablet Take 20 mg by mouth daily      predniSONE (DELTASONE) 5 MG tablet Take 5 mg by mouth daily      Dextromethorphan-guaiFENesin (MUCINEX DM PO) Take by mouth      carvedilol (COREG) 12.5 MG tablet TAKE 1/2 (ONE-HALF) TABLET BY MOUTH TWICE DAILY WITH MEALS 30 tablet 5    ibandronate (BONIVA) 150 MG tablet Take 1 tablet by mouth every 30 days Take one (1) tablet once per month in the morning with a full glass of water, on an empty stomach, and do not take anything else by mouth or lie down for the next 30 minutes. 1 tablet 11    predniSONE (DELTASONE) 10 MG tablet Take 4 tablets by mouth daily for 4 days, THEN 3 tablets daily for 4 days, THEN 2 tablets daily for 4 days, THEN 1 tablet daily for 4 days, THEN 0.5 tablets daily for 4 days.  (Patient taking differently: No sig reported) 42 tablet 0    [DISCONTINUED] doxycycline hyclate (VIBRA-TABS) 100 MG tablet Take 1 tablet by mouth 2 times daily for 7 days 14 tablet 0    atorvastatin (LIPITOR) 10 MG tablet Take 10 mg by mouth daily Taking 40mg      doxepin (SINEQUAN) 10 MG capsule TAKE 1 CAPSULE BY MOUTH NIGHTLY 30 capsule 11    albuterol sulfate HFA (PROVENTIL;VENTOLIN;PROAIR) 108 (90 Base) MCG/ACT inhaler Inhale 2 puffs into the lungs every 6 hours as needed for Wheezing 18 g 3    isosorbide mononitrate (IMDUR) 60 MG extended release tablet Take 1/2 (one-half) tablet by mouth once daily 30 tablet 0    busPIRone (BUSPAR) 5 MG tablet Take 1 tablet by mouth twice daily 60 tablet 5    guaiFENesin (MUCINEX) 600 MG extended release tablet Take 1 tablet by mouth 2 times daily 60 tablet 5    montelukast (SINGULAIR) 10 MG tablet Take 1 tablet by mouth once daily 30 tablet 11    Arformoterol Tartrate (BROVANA) 15 MCG/2ML NEBU Take 1 ampule by nebulization 2 times daily 120 mL 5    nitroGLYCERIN (NITROSTAT) 0.4 MG SL tablet Place 1 tablet under the tongue every 5 minutes as needed for Chest pain 25 tablet 3    docusate sodium (COLACE) 100 MG capsule Take 100 mg by mouth daily as needed       aspirin 81 MG chewable tablet Take 1 tablet by mouth daily 30 tablet 3        Medications patient taking as of now reconciled against medications ordered at time of hospital discharge: Yes    Review of Systems   Constitutional:  Positive for fatigue. Negative for chills and fever. Respiratory:  Positive for cough and shortness of breath. Cardiovascular:  Negative for chest pain. Gastrointestinal: Negative. Musculoskeletal: Negative. Neurological: Negative. Psychiatric/Behavioral:  The patient is not nervous/anxious. Objective:    /74 (Site: Left Upper Arm, Position: Sitting, Cuff Size: Medium Adult)   Pulse 95   Temp 98.3 °F (36.8 °C)   Resp 20   Wt 104 lb (47.2 kg)   LMP  (LMP Unknown)   SpO2 95%   BMI 17.31 kg/m²   Physical Exam  Vitals reviewed.    Constitutional: Appearance: She is ill-appearing. HENT:      Head: Normocephalic. Cardiovascular:      Rate and Rhythm: Regular rhythm. Tachycardia present. Pulmonary:      Effort: Pulmonary effort is normal.      Breath sounds: Wheezing present. Abdominal:      General: Bowel sounds are normal.      Palpations: Abdomen is soft. Musculoskeletal:         General: Normal range of motion. Skin:     General: Skin is warm and dry. Neurological:      Mental Status: She is alert and oriented to person, place, and time. Psychiatric:         Mood and Affect: Mood is anxious. An electronic signature was used to authenticate this note.   --Matteo Read PA-C

## 2022-08-31 NOTE — ASSESSMENT & PLAN NOTE
continue all meds as instructed at time of discharge, f/u with pulmonologist in next 1-2 weeks. Recheck CXR in 1 week, labs today.  F/u here in 2-4 weeks-sooner if worse/prn

## 2022-09-19 ENCOUNTER — COMMUNITY OUTREACH (OUTPATIENT)
Dept: FAMILY MEDICINE CLINIC | Age: 72
End: 2022-09-19

## 2022-09-19 NOTE — PROGRESS NOTES
Patient's HM shows they are overdue for [place] [id number] and  files searched. No results to attach to order nor HM updated.

## 2022-09-26 RX ORDER — ISOSORBIDE MONONITRATE 60 MG/1
TABLET, EXTENDED RELEASE ORAL
Qty: 30 TABLET | Refills: 3 | Status: SHIPPED | OUTPATIENT
Start: 2022-09-26

## 2022-09-30 PROBLEM — Z09 HOSPITAL DISCHARGE FOLLOW-UP: Status: RESOLVED | Noted: 2022-08-31 | Resolved: 2022-09-30

## 2022-10-25 ENCOUNTER — HOSPITAL ENCOUNTER (OUTPATIENT)
Dept: GENERAL RADIOLOGY | Age: 72
Discharge: HOME | End: 2022-10-25
Payer: MEDICARE

## 2022-10-25 ENCOUNTER — HOSPITAL ENCOUNTER (OUTPATIENT)
Age: 72
Discharge: HOME | End: 2022-10-25
Payer: MEDICARE

## 2022-10-25 DIAGNOSIS — J18.9 PNEUMONIA OF BOTH LOWER LOBES DUE TO INFECTIOUS ORGANISM: ICD-10-CM

## 2022-10-25 DIAGNOSIS — J43.2 CENTRILOBULAR EMPHYSEMA (HCC): ICD-10-CM

## 2022-10-25 DIAGNOSIS — J96.11 CHRONIC RESPIRATORY FAILURE WITH HYPOXIA (HCC): ICD-10-CM

## 2022-10-25 PROCEDURE — 71046 X-RAY EXAM CHEST 2 VIEWS: CPT

## 2022-12-06 ENCOUNTER — HOSPITAL ENCOUNTER (EMERGENCY)
Age: 72
Discharge: HOME | End: 2022-12-06
Attending: EMERGENCY MEDICINE
Payer: MEDICARE

## 2022-12-06 ENCOUNTER — APPOINTMENT (OUTPATIENT)
Dept: CT IMAGING | Age: 72
End: 2022-12-06
Payer: MEDICARE

## 2022-12-06 ENCOUNTER — APPOINTMENT (OUTPATIENT)
Dept: GENERAL RADIOLOGY | Age: 72
End: 2022-12-06
Payer: MEDICARE

## 2022-12-06 VITALS
WEIGHT: 104 LBS | BODY MASS INDEX: 17.31 KG/M2 | TEMPERATURE: 97.9 F | RESPIRATION RATE: 16 BRPM | OXYGEN SATURATION: 95 % | HEART RATE: 75 BPM | DIASTOLIC BLOOD PRESSURE: 62 MMHG | SYSTOLIC BLOOD PRESSURE: 105 MMHG

## 2022-12-06 DIAGNOSIS — J18.9 PNEUMONIA OF RIGHT UPPER LOBE DUE TO INFECTIOUS ORGANISM: ICD-10-CM

## 2022-12-06 DIAGNOSIS — S20.212A CONTUSION OF LEFT CHEST WALL, INITIAL ENCOUNTER: Primary | ICD-10-CM

## 2022-12-06 PROCEDURE — 99284 EMERGENCY DEPT VISIT MOD MDM: CPT

## 2022-12-06 PROCEDURE — 72128 CT CHEST SPINE W/O DYE: CPT

## 2022-12-06 PROCEDURE — 71046 X-RAY EXAM CHEST 2 VIEWS: CPT

## 2022-12-06 PROCEDURE — 6370000000 HC RX 637 (ALT 250 FOR IP): Performed by: EMERGENCY MEDICINE

## 2022-12-06 RX ORDER — DOXYCYCLINE HYCLATE 100 MG
100 TABLET ORAL ONCE
Status: COMPLETED | OUTPATIENT
Start: 2022-12-06 | End: 2022-12-06

## 2022-12-06 RX ORDER — HYDROCODONE BITARTRATE AND ACETAMINOPHEN 5; 325 MG/1; MG/1
1 TABLET ORAL ONCE
Status: COMPLETED | OUTPATIENT
Start: 2022-12-06 | End: 2022-12-06

## 2022-12-06 RX ORDER — DOXYCYCLINE HYCLATE 100 MG
100 TABLET ORAL 2 TIMES DAILY
Qty: 14 TABLET | Refills: 0 | Status: SHIPPED | OUTPATIENT
Start: 2022-12-06 | End: 2022-12-13

## 2022-12-06 RX ORDER — NAPROXEN 375 MG/1
375 TABLET ORAL 2 TIMES DAILY PRN
Qty: 20 TABLET | Refills: 0 | Status: SHIPPED | OUTPATIENT
Start: 2022-12-06

## 2022-12-06 RX ORDER — HYDROCODONE BITARTRATE AND ACETAMINOPHEN 5; 325 MG/1; MG/1
1 TABLET ORAL EVERY 6 HOURS PRN
Qty: 10 TABLET | Refills: 0 | Status: SHIPPED | OUTPATIENT
Start: 2022-12-06 | End: 2022-12-09

## 2022-12-06 RX ADMIN — DOXYCYCLINE HYCLATE 100 MG: 100 TABLET, COATED ORAL at 20:14

## 2022-12-06 RX ADMIN — HYDROCODONE BITARTRATE AND ACETAMINOPHEN 1 TABLET: 5; 325 TABLET ORAL at 19:31

## 2022-12-06 ASSESSMENT — PAIN DESCRIPTION - LOCATION: LOCATION: RIB CAGE

## 2022-12-06 ASSESSMENT — PAIN DESCRIPTION - ORIENTATION: ORIENTATION: RIGHT;LEFT

## 2022-12-06 ASSESSMENT — PAIN SCALES - GENERAL
PAINLEVEL_OUTOF10: 8
PAINLEVEL_OUTOF10: 8

## 2022-12-06 ASSESSMENT — ENCOUNTER SYMPTOMS
SHORTNESS OF BREATH: 0
COUGH: 1

## 2022-12-06 ASSESSMENT — PAIN - FUNCTIONAL ASSESSMENT: PAIN_FUNCTIONAL_ASSESSMENT: 0-10

## 2022-12-06 ASSESSMENT — PAIN DESCRIPTION - DESCRIPTORS: DESCRIPTORS: SHARP

## 2022-12-06 NOTE — ED PROVIDER NOTES
Triage Chief Complaint:   Emesis, Diarrhea, and Fall (L rib cage, L hip pain from fall last night )    [place]:  Alice Cee is a 67 y.o. female that presents to the ED complaining pain in the left side of her ribs hip. Patient fell about 4:00 this morning the nurses note states last evening. Patient is a chronically ill cachectic 77-year-old female on chronic oxygen due to COPD she denies any new cough no new shortness of breath. She is been compliant complain of pain in the left anterior lateral chest no hemoptysis. She denies striking her head.   No injuries to the upper lower extremity        Past Medical History:   Diagnosis Date    Acute on chronic respiratory failure with hypoxia ([facility] [address] 07/19/2021    Age-related osteoporosis with current pathological fracture with routine healing 10/05/2021    Chronic systolic heart failure (HCC)     Compression fracture of thoracic vertebra with routine healing 08/06/2021    COPD (chronic obstructive pulmonary disease) (HCC)     Coronary artery disease involving native coronary artery of native heart without angina pectoris     Hyperlipidemia     Mild pulmonary arterial systolic hypertension (HCC)     Mitral regurgitation     Pneumonia     Primary hypertension     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    Lima to LAD & Diag, SVG to Post lat RCA & Ramus     Past Surgical History:   Procedure Laterality Date    APPENDECTOMY      CARDIAC SURGERY      HYSTERECTOMY (CERVIX STATUS UNKNOWN)      INTRACAPSULAR CATARACT EXTRACTION Left 9/26/2019    EYE CATARACT EMULSIFICATION IOL IMPLANT performed by Cecille Lino MD at [address] [place] [address]       Family History   Problem Relation Age of Onset    Other Mother         copd, emphysema    Colon Cancer Sister     Other Brother         copd, agent orange    Other Brother         agent orange     Social History     Socioeconomic History    Marital status:      Spouse name: Not on file    Number of children: Not on file    Years of education: Not on file    Highest education level: Not on file   Occupational History    Not on file   Tobacco Use    Smoking status: Former     Packs/day: 1.00     Years: 40.00     Pack years: 40.00     Types: Cigarettes     Quit date: 5/15/2015     Years since quittin.5    Smokeless tobacco: Never   Vaping Use    Vaping Use: Never used   Substance and Sexual Activity    Alcohol use: No     Alcohol/week: 0.0 standard drinks    Drug use: No    Sexual activity: Yes     Partners: Male   Other Topics Concern    Not on file   Social History Narrative    ** Merged History Encounter **          Social Determinants of Health     Financial Resource Strain: Not on file   Food Insecurity: Not on file   Transportation Needs: Not on file   Physical Activity: Not on file   Stress: Not on file   Social Connections: Not on file   Intimate Partner Violence: Not on file   Housing Stability: Not on file     Current Facility-Administered Medications   Medication Dose Route Frequency Provider Last Rate Last Admin    HYDROcodone-acetaminophen (NORCO) 5-325 MG per tablet 1 tablet  1 tablet Oral Once Alonza Faroese, DO         Current Outpatient Medications   Medication Sig Dispense Refill    isosorbide mononitrate (IMDUR) 60 MG extended release tablet Take 1/2 (one-half) tablet by mouth once daily 30 tablet 3    lisinopril (PRINIVIL;ZESTRIL) 20 MG tablet Take 20 mg by mouth daily      predniSONE (DELTASONE) 5 MG tablet Take 5 mg by mouth daily      Dextromethorphan-guaiFENesin (MUCINEX DM PO) Take by mouth      carvedilol (COREG) 12.5 MG tablet TAKE 1/2 (ONE-HALF) TABLET BY MOUTH TWICE DAILY WITH MEALS 30 tablet 5    ibandronate (BONIVA) 150 MG tablet Take 1 tablet by mouth every 30 days Take one (1) tablet once per month in the morning with a full glass of water, on an empty stomach, and do not take anything else by mouth or lie down for the next 30 minutes.  1 tablet 11    atorvastatin (LIPITOR) 10 MG tablet Take 10 mg by mouth daily Taking 40mg      doxepin (SINEQUAN) 10 MG capsule TAKE 1 CAPSULE BY MOUTH NIGHTLY 30 capsule 11    albuterol sulfate HFA (PROVENTIL;VENTOLIN;PROAIR) 108 (90 Base) MCG/ACT inhaler Inhale 2 puffs into the lungs every 6 hours as needed for Wheezing 18 g 3    busPIRone (BUSPAR) 5 MG tablet Take 1 tablet by mouth twice daily 60 tablet 5    montelukast (SINGULAIR) 10 MG tablet Take 1 tablet by mouth once daily 30 tablet 11    Arformoterol Tartrate (BROVANA) 15 MCG/2ML NEBU Take 1 ampule by nebulization 2 times daily 120 mL 5    nitroGLYCERIN (NITROSTAT) 0.4 MG SL tablet Place 1 tablet under the tongue every 5 minutes as needed for Chest pain 25 tablet 3    docusate sodium (COLACE) 100 MG capsule Take 100 mg by mouth daily as needed       aspirin 81 MG chewable tablet Take 1 tablet by mouth daily 30 tablet 3     Allergies   Allergen Reactions    Seasonal Itching         ROS:    Review of Systems   Constitutional: Negative. HENT: Negative. Respiratory:  Positive for cough. Negative for shortness of breath. Chest wall pain   Musculoskeletal: Negative. All other systems reviewed and are negative. Nursing Notes Reviewed    Physical Exam:    BP (!) 100/59   Pulse 77   Temp 97.9 °F (36.6 °C) (Oral)   Resp 18   Wt 104 lb (47.2 kg)   LMP  (LMP Unknown)   SpO2 95%   BMI 17.31 kg/m²      ED Triage Vitals   Enc Vitals Group      BP 12/06/22 1715 (!) 100/59      Heart Rate 12/06/22 1711 77      Resp 12/06/22 1711 18      Temp 12/06/22 1711 97.9 °F (36.6 °C)      Temp Source 12/06/22 1711 Oral      SpO2 12/06/22 1711 95 %      Weight 12/06/22 1711 104 lb (47.2 kg)      Height --       Head Circumference --       Peak Flow --       Pain Score --       Pain Loc --       Pain Edu? --       Excl. in [address]? --        Physical Exam  Vitals and nursing note reviewed. Exam conducted with a chaperone present. Constitutional:       Appearance: She is well-developed. She is cachectic. She is not ill-appearing. HENT:      Head: Normocephalic and atraumatic. Right Ear: External ear normal.      Left Ear: External ear normal.   Eyes:      General: No scleral icterus. Right eye: No discharge. Left eye: No discharge. Conjunctiva/sclera: Conjunctivae normal.      Pupils: Pupils are equal, round, and reactive to light. Neck:      Thyroid: No thyromegaly. Vascular: No JVD. Trachea: No tracheal deviation. Cardiovascular:      Rate and Rhythm: Normal rate and regular rhythm. Heart sounds: Normal heart sounds. No murmur heard. No friction rub. No gallop. Pulmonary:      Effort: Pulmonary effort is normal. No respiratory distress. Breath sounds: Normal breath sounds. No stridor. No wheezing or rales. Chest:      Chest wall: Tenderness present. Abdominal:      General: Bowel sounds are normal. There is no distension. Palpations: Abdomen is soft. There is no mass. Tenderness: There is no abdominal tenderness. There is no guarding or rebound. Hernia: No hernia is present. Musculoskeletal:         General: No tenderness or deformity. Normal range of motion. Cervical back: Normal, normal range of motion and neck supple. Thoracic back: Normal.      Lumbar back: Normal.      Right hip: Normal.      Left hip: Normal.      Right knee: Normal.      Left knee: Normal.      Right ankle: Normal.      Left ankle: Normal.   Lymphadenopathy:      Cervical: No cervical adenopathy. Skin:     General: Skin is warm and dry. Coloration: Skin is not pale. Findings: No erythema or rash. Neurological:      General: No focal deficit present. Mental Status: She is alert and oriented to person, place, and time. Cranial Nerves: No cranial nerve deficit. Sensory: No sensory deficit. Deep Tendon Reflexes: Reflexes are normal and symmetric.  Reflexes normal.   Psychiatric:         Mood and Affect: Mood normal. Speech: Speech normal.         Behavior: Behavior normal.         Thought Content: Thought content normal.         Judgment: Judgment normal.       I have reviewed and interpreted all of the currently available lab results from this visit (ifapplicable):  No results found for this visit on 12/06/22. Radiographs (if obtained):  [] The following radiograph wasinterpreted by myself in the absence of a radiologist:   [] Radiologist's Report Reviewed:  XR CHEST (2 VW)    (Results Pending)         EKG (if obtained): (All EKG's are interpreted by myself in the absence of a cardiologist)    Chart review shows recent radiographs:  No results found. MDM:      Kyra Wang presents to the ED complaining of pain left side of her rib cage. She was examined with diffuse pain without crepitus or subcu air lung sounds symmetrical and rhonchorous no wheeze she has no midline bony cervical thoracic lumbar spine pain. The lateral view of her chest x-ray does reveal severe thoracic compression fractures I do not see anything new on my review treatment will be supportive she received 1 dose of Bastian in the ED I will send her home with plain Tylenol 500 4 times daily as needed I do not want any opiates considering her age and oxygen dependency. She understands the natural history that may take several weeks to improve she could have an occult fracture    Please note that portions of this note may have been completed with a voice recognition/dictation program. Efforts were made to edit the dictations but occasionally words are mis-transcribed. All pertinent Lab data and radiographic results reviewed with patient at bedside. The patient and/or the family were informed of the results of any tests/labs/imaging, the treatment plan, and time was allotted to answer questions. See chart for details of medications given during the ED stay.      The likelihood of other entities in the differential is insufficient to justify any further testing for them. This was explained to the patient. The patient was advised that persistent or worsening symptoms would require further evaluation. 6:48 PM-call from radiologist concerning findings for possible T9 fracture. A CT of the thoracic spine will be obtained. Patient was examined and does not have any clinical pain in this region    Case signed out to Dr. Phong Ronquillo:  1. Contusion of left chest wall, initial encounter      Disposition referral (if applicable):  Elizabeth Romo PA-C  [address] [place] [address]  [phone]    Go in 1 week  If symptoms worsen  Disposition medications (if applicable):  New Prescriptions    No medications on file           Mandeep Morley DO, FACEP      Comment: Please note this report has been produced using speech recognition software and maycontain errors related to that system including errors in grammar, punctuation, and spelling, as well as words and phrases that may be inappropriate. If there are any questions or concerns please feel free to contact thedictating provider for clarification.         Cynthia Ruiz,   12/06/22 [address] [facility] [address],   12/06/22 1448

## 2022-12-07 NOTE — ED NOTES
Discharge instructions and prescriptions reviewed with pt and verbalizes understanding.      Montse Ricardo RN  12/06/22 2019

## 2022-12-07 NOTE — ED PROVIDER NOTES
CARE RECEIVED FROM: Dr. Mariam Dolan  I reviewed the luna elements of the history, physical exam and initial treatment plan at the bedside. ANCILLARY DATA:  I reviewed the images. Radiologist interpretation:   CT THORACIC SPINE WO CONTRAST   Final Result   1. Multiple thoracic compressions but these appear chronic. 2. Consolidation in the right upper lobe. Follow-up radiographs to   resolution are recommended. 3. The nodule in the right upper lobe may be minimally more prominent. Follow-up chest CT in 6 months is recommended         XR CHEST (2 VW)   Final Result   Addendum (preliminary) 1 of 1   ADDENDUM:   Findings were discussed with Carole Victoria at 6:46 pm on 12/6/2022. Final   Possible transverse fracture within the T9 vertebral body, which is   potentially unstable if this is a true finding. Thoracic spine CT is   recommended to better characterize that. Infiltrate in the right middle lobe and/or the right upper lobe, concerning   for pneumonia, but atelectasis remains in the differential.  It is new when   compared to the previous exam.           Labs Reviewed - No data to display  [address] / PLAN:    This is a 66-year-old female that presented to the emergency department complaints of left-sided rib pain after mechanical fall at 4 AM.  She is chronically ill with COPD and chronic oxygen dependency with baseline oxygen requirements on her evaluation here. She had chest x-ray which was without evidence of pneumothorax or obvious rib fracture and demonstrated right upper lobe infiltrate. Her plan was initially for discharge although radiology called and states that she had a possible transverse fracture to the T9 vertebral body and therefore CT of the thoracic spine was ordered prior to signout. The CT image here demonstrates chronic compression fractures with no new vertebral body fracture. It does demonstrate consolidation right upper lobe.   I did speak with the patient at bedside and she tells me that over the last 3 to 4 days she has been experiencing some increasing cough frequency as well as yellow sputum production but no fevers or chills. She states the cough was slightly better today. However, given her findings of right upper lobe consolidation with increasing cough frequency and mucus production we will initiate her on doxycycline here for treatment of pneumonia. She remains at her baseline oxygen requirements and was without tachycardia or fever and at this time I feels appropriate for outpatient management. Discharged with a prescription for doxycycline. Return precautions provided    FINAL IMPRESSION:  1. Contusion of left chest wall, initial encounter    2. Pneumonia of right upper lobe due to infectious organism      ? Electronically signed by:  Aye Drummond DO, 12/6/2022        Aye Drummond DO  12/06/22 [id number]

## 2023-01-03 ENCOUNTER — HOSPITAL ENCOUNTER (INPATIENT)
Age: 73
LOS: 1 days | Discharge: HOME HEALTH | DRG: 190 | End: 2023-01-04
Attending: EMERGENCY MEDICINE | Admitting: INTERNAL MEDICINE
Payer: MEDICARE

## 2023-01-03 ENCOUNTER — APPOINTMENT (OUTPATIENT)
Dept: GENERAL RADIOLOGY | Age: 73
DRG: 190 | End: 2023-01-03
Payer: MEDICARE

## 2023-01-03 ENCOUNTER — OFFICE VISIT (OUTPATIENT)
Dept: FAMILY MEDICINE CLINIC | Age: 73
End: 2023-01-03
Payer: MEDICARE

## 2023-01-03 VITALS
RESPIRATION RATE: 24 BRPM | WEIGHT: 98 LBS | OXYGEN SATURATION: 93 % | HEART RATE: 94 BPM | SYSTOLIC BLOOD PRESSURE: 112 MMHG | TEMPERATURE: 97 F | DIASTOLIC BLOOD PRESSURE: 60 MMHG | BODY MASS INDEX: 16.31 KG/M2

## 2023-01-03 DIAGNOSIS — J44.1 COPD EXACERBATION (HCC): Primary | ICD-10-CM

## 2023-01-03 DIAGNOSIS — R94.31 EKG ABNORMALITY: ICD-10-CM

## 2023-01-03 DIAGNOSIS — J44.9 COPD, SEVERE (HCC): Primary | ICD-10-CM

## 2023-01-03 DIAGNOSIS — J44.1 COPD EXACERBATION (HCC): ICD-10-CM

## 2023-01-03 DIAGNOSIS — J96.21 ACUTE ON CHRONIC RESPIRATORY FAILURE WITH HYPOXIA (HCC): ICD-10-CM

## 2023-01-03 PROBLEM — I10 PRIMARY HYPERTENSION: Status: ACTIVE | Noted: 2023-01-03

## 2023-01-03 PROBLEM — R07.89 CHEST WALL PAIN: Status: ACTIVE | Noted: 2023-01-03

## 2023-01-03 LAB
ADENOVIRUS DETECTION BY PCR: NOT DETECTED
ANION GAP SERPL CALCULATED.3IONS-SCNC: 13 MMOL/L (ref 4–16)
BASE EXCESS MIXED: 0.4 (ref 0–2.3)
BASE EXCESS: ABNORMAL (ref 0–2.4)
BASOPHILS ABSOLUTE: 0.1 K/CU MM
BASOPHILS RELATIVE PERCENT: 0.8 % (ref 0–1)
BORDETELLA PARAPERTUSSIS BY PCR: NOT DETECTED
BORDETELLA PERTUSSIS PCR: NOT DETECTED
BUN BLDV-MCNC: 19 MG/DL (ref 6–23)
CALCIUM SERPL-MCNC: 9.1 MG/DL (ref 8.3–10.6)
CHLAMYDOPHILA PNEUMONIA PCR: NOT DETECTED
CHLORIDE BLD-SCNC: 91 MMOL/L (ref 99–110)
CO2 CONTENT: 27.3 MMOL/L (ref 19–24)
CO2: 26 MMOL/L (ref 21–32)
CORONAVIRUS 229E PCR: NOT DETECTED
CORONAVIRUS HKU1 PCR: NOT DETECTED
CORONAVIRUS NL63 PCR: NOT DETECTED
CORONAVIRUS OC43 PCR: NOT DETECTED
CREAT SERPL-MCNC: 0.2 MG/DL (ref 0.6–1.1)
DIFFERENTIAL TYPE: ABNORMAL
EOSINOPHILS ABSOLUTE: 0.2 K/CU MM
EOSINOPHILS RELATIVE PERCENT: 1.7 % (ref 0–3)
GFR SERPL CREATININE-BSD FRML MDRD: >60 ML/MIN/1.73M2
GLUCOSE BLD-MCNC: 110 MG/DL (ref 70–99)
HCO3 VENOUS: 26 MMOL/L (ref 19–25)
HCT VFR BLD CALC: 37.8 % (ref 37–47)
HEMOGLOBIN: 12.6 GM/DL (ref 12.5–16)
HUMAN METAPNEUMOVIRUS PCR: NOT DETECTED
IMMATURE NEUTROPHIL %: 0.6 % (ref 0–0.43)
INFLUENZA A BY PCR: NOT DETECTED
INFLUENZA A H1 (2009) PCR: NOT DETECTED
INFLUENZA A H1 PANDEMIC PCR: NOT DETECTED
INFLUENZA A H3 PCR: NOT DETECTED
INFLUENZA B BY PCR: NOT DETECTED
LACTIC ACID, SEPSIS: 1.3 MMOL/L (ref 0.5–1.9)
LACTIC ACID, SEPSIS: 1.9 MMOL/L (ref 0.5–1.9)
LYMPHOCYTES ABSOLUTE: 1.8 K/CU MM
LYMPHOCYTES RELATIVE PERCENT: 16.2 % (ref 24–44)
MCH RBC QN AUTO: 30.5 PG (ref 27–31)
MCHC RBC AUTO-ENTMCNC: 33.3 % (ref 32–36)
MCV RBC AUTO: 91.5 FL (ref 78–100)
MONOCYTES ABSOLUTE: 1 K/CU MM
MONOCYTES RELATIVE PERCENT: 9.4 % (ref 0–4)
MYCOPLASMA PNEUMONIAE PCR: NOT DETECTED
O2 SAT, VEN: 39.2 % (ref 50–70)
PARAINFLUENZA 1 PCR: NOT DETECTED
PARAINFLUENZA 2 PCR: NOT DETECTED
PARAINFLUENZA 3 PCR: NOT DETECTED
PARAINFLUENZA 4 PCR: NOT DETECTED
PCO2, VEN: 44.1 MMHG (ref 38–52)
PDW BLD-RTO: 12.6 % (ref 11.7–14.9)
PH VENOUS: 7.38 (ref 7.32–7.42)
PLATELET # BLD: 474 K/CU MM (ref 140–440)
PMV BLD AUTO: 9.2 FL (ref 7.5–11.1)
PO2, VEN: 23.3 MMHG (ref 28–48)
POTASSIUM SERPL-SCNC: 5 MMOL/L (ref 3.5–5.1)
RAPID INFLUENZA  B AGN: NEGATIVE
RAPID INFLUENZA A AGN: NEGATIVE
RBC # BLD: 4.13 M/CU MM (ref 4.2–5.4)
RHINOVIRUS ENTEROVIRUS PCR: NOT DETECTED
RSV PCR: NOT DETECTED
SARS-COV-2, NAAT: NOT DETECTED
SARS-COV-2: NOT DETECTED
SEGMENTED NEUTROPHILS ABSOLUTE COUNT: 7.7 K/CU MM
SEGMENTED NEUTROPHILS RELATIVE PERCENT: 71.3 % (ref 36–66)
SODIUM BLD-SCNC: 130 MMOL/L (ref 135–145)
SOURCE, BLOOD GAS: ABNORMAL
TOTAL IMMATURE NEUTOROPHIL: 0.07 K/CU MM
TROPONIN T: <0.01 NG/ML
WBC # BLD: 10.8 K/CU MM (ref 4–10.5)

## 2023-01-03 PROCEDURE — 87635 SARS-COV-2 COVID-19 AMP PRB: CPT

## 2023-01-03 PROCEDURE — 6370000000 HC RX 637 (ALT 250 FOR IP): Performed by: PHYSICIAN ASSISTANT

## 2023-01-03 PROCEDURE — 3023F SPIROM DOC REV: CPT | Performed by: NURSE PRACTITIONER

## 2023-01-03 PROCEDURE — 2700000000 HC OXYGEN THERAPY PER DAY

## 2023-01-03 PROCEDURE — 87899 AGENT NOS ASSAY W/OPTIC: CPT

## 2023-01-03 PROCEDURE — 99222 1ST HOSP IP/OBS MODERATE 55: CPT | Performed by: INTERNAL MEDICINE

## 2023-01-03 PROCEDURE — 83605 ASSAY OF LACTIC ACID: CPT

## 2023-01-03 PROCEDURE — G8419 CALC BMI OUT NRM PARAM NOF/U: HCPCS | Performed by: NURSE PRACTITIONER

## 2023-01-03 PROCEDURE — 3017F COLORECTAL CA SCREEN DOC REV: CPT | Performed by: NURSE PRACTITIONER

## 2023-01-03 PROCEDURE — 71045 X-RAY EXAM CHEST 1 VIEW: CPT

## 2023-01-03 PROCEDURE — G8484 FLU IMMUNIZE NO ADMIN: HCPCS | Performed by: NURSE PRACTITIONER

## 2023-01-03 PROCEDURE — 1123F ACP DISCUSS/DSCN MKR DOCD: CPT | Performed by: NURSE PRACTITIONER

## 2023-01-03 PROCEDURE — 87449 NOS EACH ORGANISM AG IA: CPT

## 2023-01-03 PROCEDURE — 96372 THER/PROPH/DIAG INJ SC/IM: CPT

## 2023-01-03 PROCEDURE — 99213 OFFICE O/P EST LOW 20 MIN: CPT | Performed by: NURSE PRACTITIONER

## 2023-01-03 PROCEDURE — 96375 TX/PRO/DX INJ NEW DRUG ADDON: CPT

## 2023-01-03 PROCEDURE — 6370000000 HC RX 637 (ALT 250 FOR IP): Performed by: EMERGENCY MEDICINE

## 2023-01-03 PROCEDURE — 6360000002 HC RX W HCPCS: Performed by: EMERGENCY MEDICINE

## 2023-01-03 PROCEDURE — 1200000000 HC SEMI PRIVATE

## 2023-01-03 PROCEDURE — 99285 EMERGENCY DEPT VISIT HI MDM: CPT

## 2023-01-03 PROCEDURE — G8427 DOCREV CUR MEDS BY ELIG CLIN: HCPCS | Performed by: NURSE PRACTITIONER

## 2023-01-03 PROCEDURE — 85025 COMPLETE CBC W/AUTO DIFF WBC: CPT

## 2023-01-03 PROCEDURE — 2580000003 HC RX 258: Performed by: PHYSICIAN ASSISTANT

## 2023-01-03 PROCEDURE — 1090F PRES/ABSN URINE INCON ASSESS: CPT | Performed by: NURSE PRACTITIONER

## 2023-01-03 PROCEDURE — 6360000002 HC RX W HCPCS: Performed by: PHYSICIAN ASSISTANT

## 2023-01-03 PROCEDURE — 93005 ELECTROCARDIOGRAM TRACING: CPT | Performed by: EMERGENCY MEDICINE

## 2023-01-03 PROCEDURE — 94640 AIRWAY INHALATION TREATMENT: CPT

## 2023-01-03 PROCEDURE — 80048 BASIC METABOLIC PNL TOTAL CA: CPT

## 2023-01-03 PROCEDURE — 0202U NFCT DS 22 TRGT SARS-COV-2: CPT

## 2023-01-03 PROCEDURE — 87804 INFLUENZA ASSAY W/OPTIC: CPT

## 2023-01-03 PROCEDURE — 96365 THER/PROPH/DIAG IV INF INIT: CPT

## 2023-01-03 PROCEDURE — 1036F TOBACCO NON-USER: CPT | Performed by: NURSE PRACTITIONER

## 2023-01-03 PROCEDURE — 94761 N-INVAS EAR/PLS OXIMETRY MLT: CPT

## 2023-01-03 PROCEDURE — 84484 ASSAY OF TROPONIN QUANT: CPT

## 2023-01-03 PROCEDURE — 87040 BLOOD CULTURE FOR BACTERIA: CPT

## 2023-01-03 PROCEDURE — 2140000000 HC CCU INTERMEDIATE R&B

## 2023-01-03 PROCEDURE — G8399 PT W/DXA RESULTS DOCUMENT: HCPCS | Performed by: NURSE PRACTITIONER

## 2023-01-03 RX ORDER — IPRATROPIUM BROMIDE AND ALBUTEROL SULFATE 2.5; .5 MG/3ML; MG/3ML
1 SOLUTION RESPIRATORY (INHALATION)
Status: DISCONTINUED | OUTPATIENT
Start: 2023-01-03 | End: 2023-01-04 | Stop reason: HOSPADM

## 2023-01-03 RX ORDER — ENOXAPARIN SODIUM 100 MG/ML
30 INJECTION SUBCUTANEOUS EVERY EVENING
Status: DISCONTINUED | OUTPATIENT
Start: 2023-01-03 | End: 2023-01-04 | Stop reason: HOSPADM

## 2023-01-03 RX ORDER — BUSPIRONE HYDROCHLORIDE 5 MG/1
5 TABLET ORAL 2 TIMES DAILY
Status: DISCONTINUED | OUTPATIENT
Start: 2023-01-03 | End: 2023-01-04 | Stop reason: HOSPADM

## 2023-01-03 RX ORDER — LISINOPRIL 20 MG/1
20 TABLET ORAL DAILY
Status: DISCONTINUED | OUTPATIENT
Start: 2023-01-04 | End: 2023-01-04 | Stop reason: HOSPADM

## 2023-01-03 RX ORDER — AZITHROMYCIN 250 MG/1
250 TABLET, FILM COATED ORAL SEE ADMIN INSTRUCTIONS
Qty: 6 TABLET | Refills: 0 | Status: ON HOLD | OUTPATIENT
Start: 2023-01-03 | End: 2023-01-03 | Stop reason: ALTCHOICE

## 2023-01-03 RX ORDER — ENOXAPARIN SODIUM 100 MG/ML
40 INJECTION SUBCUTANEOUS DAILY
Status: DISCONTINUED | OUTPATIENT
Start: 2023-01-03 | End: 2023-01-03 | Stop reason: DRUGHIGH

## 2023-01-03 RX ORDER — ONDANSETRON 2 MG/ML
4 INJECTION INTRAMUSCULAR; INTRAVENOUS EVERY 6 HOURS PRN
Status: DISCONTINUED | OUTPATIENT
Start: 2023-01-03 | End: 2023-01-03

## 2023-01-03 RX ORDER — ASPIRIN 81 MG/1
324 TABLET, CHEWABLE ORAL ONCE
Status: COMPLETED | OUTPATIENT
Start: 2023-01-03 | End: 2023-01-03

## 2023-01-03 RX ORDER — PREDNISONE 20 MG/1
40 TABLET ORAL DAILY
Qty: 10 TABLET | Refills: 0 | Status: ON HOLD | OUTPATIENT
Start: 2023-01-03 | End: 2023-01-03 | Stop reason: ALTCHOICE

## 2023-01-03 RX ORDER — ACETAMINOPHEN 325 MG/1
650 TABLET ORAL EVERY 6 HOURS PRN
Status: DISCONTINUED | OUTPATIENT
Start: 2023-01-03 | End: 2023-01-04 | Stop reason: HOSPADM

## 2023-01-03 RX ORDER — ASPIRIN 81 MG/1
81 TABLET ORAL DAILY
Status: DISCONTINUED | OUTPATIENT
Start: 2023-01-04 | End: 2023-01-04 | Stop reason: HOSPADM

## 2023-01-03 RX ORDER — ACETAMINOPHEN 650 MG/1
650 SUPPOSITORY RECTAL EVERY 6 HOURS PRN
Status: DISCONTINUED | OUTPATIENT
Start: 2023-01-03 | End: 2023-01-04 | Stop reason: HOSPADM

## 2023-01-03 RX ORDER — METHYLPREDNISOLONE SODIUM SUCCINATE 125 MG/2ML
125 INJECTION, POWDER, LYOPHILIZED, FOR SOLUTION INTRAMUSCULAR; INTRAVENOUS ONCE
Status: COMPLETED | OUTPATIENT
Start: 2023-01-03 | End: 2023-01-03

## 2023-01-03 RX ORDER — ATORVASTATIN CALCIUM 10 MG/1
10 TABLET, FILM COATED ORAL NIGHTLY
Status: DISCONTINUED | OUTPATIENT
Start: 2023-01-03 | End: 2023-01-04 | Stop reason: HOSPADM

## 2023-01-03 RX ORDER — METHYLPREDNISOLONE SODIUM SUCCINATE 40 MG/ML
40 INJECTION, POWDER, LYOPHILIZED, FOR SOLUTION INTRAMUSCULAR; INTRAVENOUS EVERY 12 HOURS
Status: DISCONTINUED | OUTPATIENT
Start: 2023-01-04 | End: 2023-01-04 | Stop reason: HOSPADM

## 2023-01-03 RX ORDER — DOXEPIN HYDROCHLORIDE 10 MG/1
10 CAPSULE ORAL NIGHTLY
Status: DISCONTINUED | OUTPATIENT
Start: 2023-01-03 | End: 2023-01-04 | Stop reason: HOSPADM

## 2023-01-03 RX ORDER — FORMOTEROL FUMARATE 20 UG/2ML
15 SOLUTION RESPIRATORY (INHALATION)
Status: DISCONTINUED | OUTPATIENT
Start: 2023-01-03 | End: 2023-01-03 | Stop reason: SDUPTHER

## 2023-01-03 RX ORDER — LEVOFLOXACIN 5 MG/ML
750 INJECTION, SOLUTION INTRAVENOUS EVERY 24 HOURS
Status: DISCONTINUED | OUTPATIENT
Start: 2023-01-03 | End: 2023-01-04

## 2023-01-03 RX ORDER — SODIUM CHLORIDE 0.9 % (FLUSH) 0.9 %
10 SYRINGE (ML) INJECTION PRN
Status: DISCONTINUED | OUTPATIENT
Start: 2023-01-03 | End: 2023-01-04 | Stop reason: HOSPADM

## 2023-01-03 RX ORDER — NITROGLYCERIN 0.4 MG/1
0.4 TABLET SUBLINGUAL EVERY 5 MIN PRN
Status: DISCONTINUED | OUTPATIENT
Start: 2023-01-03 | End: 2023-01-04 | Stop reason: HOSPADM

## 2023-01-03 RX ORDER — SODIUM CHLORIDE 9 MG/ML
INJECTION, SOLUTION INTRAVENOUS PRN
Status: DISCONTINUED | OUTPATIENT
Start: 2023-01-03 | End: 2023-01-04 | Stop reason: HOSPADM

## 2023-01-03 RX ORDER — BUDESONIDE 0.25 MG/2ML
250 INHALANT ORAL
Status: DISCONTINUED | OUTPATIENT
Start: 2023-01-03 | End: 2023-01-04 | Stop reason: HOSPADM

## 2023-01-03 RX ORDER — CARVEDILOL 6.25 MG/1
6.25 TABLET ORAL 2 TIMES DAILY WITH MEALS
Status: DISCONTINUED | OUTPATIENT
Start: 2023-01-03 | End: 2023-01-04 | Stop reason: HOSPADM

## 2023-01-03 RX ORDER — IPRATROPIUM BROMIDE AND ALBUTEROL SULFATE 2.5; .5 MG/3ML; MG/3ML
1 SOLUTION RESPIRATORY (INHALATION) ONCE
Status: COMPLETED | OUTPATIENT
Start: 2023-01-03 | End: 2023-01-03

## 2023-01-03 RX ORDER — ISOSORBIDE MONONITRATE 30 MG/1
30 TABLET, EXTENDED RELEASE ORAL DAILY
Status: DISCONTINUED | OUTPATIENT
Start: 2023-01-04 | End: 2023-01-04 | Stop reason: HOSPADM

## 2023-01-03 RX ORDER — PROMETHAZINE HYDROCHLORIDE 12.5 MG/1
12.5 TABLET ORAL EVERY 6 HOURS PRN
Status: DISCONTINUED | OUTPATIENT
Start: 2023-01-03 | End: 2023-01-04 | Stop reason: HOSPADM

## 2023-01-03 RX ORDER — SODIUM CHLORIDE 0.9 % (FLUSH) 0.9 %
10 SYRINGE (ML) INJECTION EVERY 12 HOURS SCHEDULED
Status: DISCONTINUED | OUTPATIENT
Start: 2023-01-03 | End: 2023-01-04 | Stop reason: HOSPADM

## 2023-01-03 RX ADMIN — ENOXAPARIN SODIUM 30 MG: 100 INJECTION SUBCUTANEOUS at 18:45

## 2023-01-03 RX ADMIN — IPRATROPIUM BROMIDE AND ALBUTEROL SULFATE 1 AMPULE: .5; 3 SOLUTION RESPIRATORY (INHALATION) at 19:48

## 2023-01-03 RX ADMIN — SODIUM CHLORIDE, PRESERVATIVE FREE 10 ML: 5 INJECTION INTRAVENOUS at 19:44

## 2023-01-03 RX ADMIN — ATORVASTATIN CALCIUM 10 MG: 10 TABLET, FILM COATED ORAL at 19:44

## 2023-01-03 RX ADMIN — METHYLPREDNISOLONE SODIUM SUCCINATE 125 MG: 125 INJECTION, POWDER, FOR SOLUTION INTRAMUSCULAR; INTRAVENOUS at 14:44

## 2023-01-03 RX ADMIN — LEVOFLOXACIN 750 MG: 5 INJECTION, SOLUTION INTRAVENOUS at 15:15

## 2023-01-03 RX ADMIN — IPRATROPIUM BROMIDE AND ALBUTEROL SULFATE 1 AMPULE: 2.5; .5 SOLUTION RESPIRATORY (INHALATION) at 14:18

## 2023-01-03 RX ADMIN — BUDESONIDE 250 MCG: 0.25 SUSPENSION RESPIRATORY (INHALATION) at 19:58

## 2023-01-03 RX ADMIN — BUSPIRONE HYDROCHLORIDE 5 MG: 5 TABLET ORAL at 19:44

## 2023-01-03 RX ADMIN — ASPIRIN 81 MG CHEWABLE TABLET 324 MG: 81 TABLET CHEWABLE at 15:11

## 2023-01-03 RX ADMIN — CARVEDILOL 6.25 MG: 6.25 TABLET, FILM COATED ORAL at 18:45

## 2023-01-03 ASSESSMENT — COPD QUESTIONNAIRES: COPD: 1

## 2023-01-03 ASSESSMENT — PAIN DESCRIPTION - PAIN TYPE: TYPE: ACUTE PAIN

## 2023-01-03 ASSESSMENT — PAIN DESCRIPTION - ORIENTATION: ORIENTATION: MID

## 2023-01-03 ASSESSMENT — ENCOUNTER SYMPTOMS
TROUBLE SWALLOWING: 1
NAUSEA: 0
VOMITING: 0
SHORTNESS OF BREATH: 1
COUGH: 1
WHEEZING: 1
COUGH: 1
SHORTNESS OF BREATH: 1
WHEEZING: 1
ABDOMINAL PAIN: 0

## 2023-01-03 ASSESSMENT — PAIN DESCRIPTION - DESCRIPTORS: DESCRIPTORS: OTHER (COMMENT)

## 2023-01-03 ASSESSMENT — PAIN DESCRIPTION - FREQUENCY: FREQUENCY: INTERMITTENT

## 2023-01-03 ASSESSMENT — PATIENT HEALTH QUESTIONNAIRE - PHQ9
SUM OF ALL RESPONSES TO PHQ QUESTIONS 1-9: 0
SUM OF ALL RESPONSES TO PHQ QUESTIONS 1-9: 0
2. FEELING DOWN, DEPRESSED OR HOPELESS: 0
SUM OF ALL RESPONSES TO PHQ QUESTIONS 1-9: 0
SUM OF ALL RESPONSES TO PHQ QUESTIONS 1-9: 0

## 2023-01-03 ASSESSMENT — PAIN - FUNCTIONAL ASSESSMENT: PAIN_FUNCTIONAL_ASSESSMENT: 0-10

## 2023-01-03 ASSESSMENT — LIFESTYLE VARIABLES
HOW OFTEN DO YOU HAVE A DRINK CONTAINING ALCOHOL: NEVER
HOW MANY STANDARD DRINKS CONTAINING ALCOHOL DO YOU HAVE ON A TYPICAL DAY: PATIENT DOES NOT DRINK

## 2023-01-03 ASSESSMENT — PAIN DESCRIPTION - LOCATION: LOCATION: CHEST

## 2023-01-03 ASSESSMENT — PAIN SCALES - GENERAL: PAINLEVEL_OUTOF10: 6

## 2023-01-03 NOTE — Clinical Note
Discharge Plan[de-identified] Other/[facility])   Telemetry/Cardiac Monitoring Required?: Yes

## 2023-01-03 NOTE — PROGRESS NOTES
LOVENOX PROPHYLAXIS EVALUATION    Wt Readings from Last 3 Encounters:   01/03/23 98 lb (44.5 kg)   01/03/23 98 lb (44.5 kg)   12/06/22 104 lb (47.2 kg)       Estimated Creatinine Clearance: 179 mL/min (A) (based on SCr of 0.2 mg/dL (L)).   Recent Labs     01/03/23  1415   BUN 19   CREATININE 0.2*   *   HGB 12.6   HCT 37.8       Weight Range: 50.9 kg and below    CRCL = 30 or greater    50.9 kg   and below     .9  kg   101-150.9 kg   151-174.9  kg   175 kg  or greater     30mg subq  daily     40mg subq daily    (or 30mg subq BID orthopedic)     30mg subq  BID   40mg subq  BID   60mg subq BID       Per P/T protocol for appropriate subq anticoagulation by weight and CRCL change to:    Enoxparin 30mg subq daily      DAVID Daniels [facility] [place]  5:26 PM  01/03/23

## 2023-01-03 NOTE — CARE COORDINATION
CM met with the patient to initiate discharge planning. Patient lives at home with her , has insurance with Rx coverage & PCP, stated that she is independent with ADLs, an no longer drives ( provides transportation as needed). Patient stated that she uses a cane at home and a wheelchair when she goes out for appointments. Patient requires home oxygen 24 hours/day (2 l/nc baseline) and uses an inhaler & nebulizer machine at home. Patient reports that in addition to her PCP she also sees Dr. Chelo Wiseman for pulmonology care. Patient stated that her  is retired and is able to help as needed. Patient plans to return home upon discharge and is unable to identify any needs at this time. CM available if needs arise.

## 2023-01-03 NOTE — PROGRESS NOTES
Treasure Garza (:  1950) is a 67 y.o. female,Established patient, here for evaluation of the following chief complaint(s): Other (Cough and congestion for a couple of weeks . No fever ) and Flu Vaccine (Already had flu vaccine )         ASSESSMENT/PLAN:  1. COPD, severe ([facility] [address]  -     fluticasone-umeclidin-vilant (TRELEGY ELLIPTA) 100-62.5-25 MCG/ACT AEPB inhaler; Inhale 1 puff into the lungs daily, Disp-1 each, R-1Normal  2. COPD exacerbation ([facility] [address]  Assessment & Plan:   Uncontrolled, changes made today: sent to ED. add Trelegy. prednison and zithromax ordered but pt decided to go to Ed. Orders:  -     predniSONE (DELTASONE) 20 MG tablet; Take 2 tablets by mouth daily for 5 days, Disp-10 tablet, R-0Normal  -     azithromycin (ZITHROMAX) 250 MG tablet; Take 1 tablet by mouth See Admin Instructions for 5 days 500mg on day 1 followed by 250mg on days 2 - 5, Disp-6 tablet, R-0Normal  3. Acute on chronic respiratory failure with hypoxia (HCC)  Assessment & Plan:   Uncontrolled, changes made today: add trelegy. FU with Pulm. pt appears tired, worn out. very short of breath. agreeable to ED today. GO TO ED. Meds above ordered. After some discussion with patient agrees to ED today. Meds already sent to pharmacy. FU with PCP next week/after ED visit as recommended. GO DIRECTLY TO ED>   ED called and informed of pt pending arrival via private car. Return if symptoms worsen or fail to improve. Subjective   SUBJECTIVE/OBJECTIVE:  Pulse ox on rooming was 93%. Recheck was 90% once hands were warmed up. COPD  She complains of cough, shortness of breath (\"I am just so tired\") and wheezing. This is a chronic problem. The problem has been rapidly worsening. The cough is non-productive and hacking. Associated symptoms include malaise/fatigue, orthopnea, trouble swallowing and weight loss. Pertinent negatives include no chest pain, fever, nasal congestion or postnasal drip.  Her symptoms are aggravated by any activity, lying down and minimal activity. Her symptoms are alleviated by nothing. She reports no improvement on treatment. Review of Systems   Constitutional:  Positive for activity change, fatigue, malaise/fatigue and weight loss. Negative for fever. HENT:  Positive for trouble swallowing. Negative for congestion and postnasal drip. Respiratory:  Positive for cough, shortness of breath (\"I am just so tired\") and wheezing. Cardiovascular:  Negative for chest pain, palpitations and leg swelling. Gastrointestinal:  Negative for abdominal pain, nausea and vomiting. No appetite. Unable to eat d/t SOA/trouble swallowing related to feeling short of breath. Musculoskeletal: Negative. Skin:  Positive for pallor. Neurological: Negative. Objective   Physical Exam  Vitals reviewed. Constitutional:       General: She is in acute distress. Appearance: She is ill-appearing. She is not diaphoretic. Comments: Cachexic appearance     Eyes:      Extraocular Movements: Extraocular movements intact. Cardiovascular:      Rate and Rhythm: Regular rhythm. Heart sounds: Normal heart sounds. No murmur heard. Pulmonary:      Effort: Respiratory distress present. Breath sounds: Wheezing present. Comments: Extremely SOB. Unable to speak more than 2-3 words. Chest:      Chest wall: No tenderness. Musculoskeletal:      Cervical back: Neck supple. Skin:     General: Skin is warm. Capillary Refill: Capillary refill takes less than 2 seconds. Coloration: Skin is pale. Neurological:      Mental Status: She is alert and oriented to person, place, and time. Motor: Weakness (in WC) present. Psychiatric:         Thought Content: Thought content normal.         Judgment: Judgment normal.                An electronic signature was used to authenticate this note.     --CHEPE Ruiz - CNP

## 2023-01-03 NOTE — ED PROVIDER NOTES
Triage Chief Complaint:   Shortness of Breath (Pt to ED via private auto with c/o worsening SOB over the past 1-2 weeks. Pt is on 2L continuous oxygen at home. Pt also reports productive cough and low grade fever. Pt alert, oriented x 3.  RR even, labored. Skin pale, warm, dry. To room via wheelchair. Pt's  at bedside. )    [place]:  Elissa Mckeon is a 67 y.o. female that presents as a breath sent from PCPs office    Patient is a 79-year-old that I saw several weeks ago when she fell concern of some rib pain she ended up having concerning findings for possible thoracic compression fracture CT was obtained there is no new findings but severe multilevel degenerative changes. She did have some airspace disease right upper lobe was treated with doxycycline. She is a chronic COPD on 2 L and is not feeling well over the past 2 weeks. No known exposed anybody with COVID or influenza. No fevers or chills she has some pleuritic discomfort. CATH:>>>    Angiographic Findings        Diagnostic Findings        Cardiac Arteries and Lesion Findings       LMCA: Normal.     LAD: 90% m id LAD stenosis     LCx: Diffuse irregularity. RCA: 100% mid segment stenosis     Cardiac Grafts        -  There is a Vein graft that originates at the Aorta Right and attaches to       the R PDA (patent). -  There is a Free LIMA graft that originates at the LIMA and attaches to       the Mid LAD (its a jump graft to the diagonal and LAD, flow in distal LAD       is competing with native flow, distal LIMA segment has moderate       stenosis). -  There is a Vein graft that originates at the Aorta Left and attaches to       the Lat 1st Diag (patent).        Procedure Data   Procedure Date   Date: 06/09/2017Start: 16:07End: 16:49              Past Medical History:   Diagnosis Date    Acute on chronic respiratory failure with hypoxia ([facility] [address] 07/19/2021    Age-related osteoporosis with current pathological fracture with routine healing 10/05/2021    Chronic systolic heart failure (HCC)     Compression fracture of thoracic vertebra with routine healing 2021    COPD (chronic obstructive pulmonary disease) (HCC)     Coronary artery disease involving native coronary artery of native heart without angina pectoris     Hyperlipidemia     Mild pulmonary arterial systolic hypertension (HCC)     Mitral regurgitation     Pneumonia     Primary hypertension     S/P CABG x 4 2015    Lima to LAD & Diag, SVG to Post lat RCA & Ramus     Past Surgical History:   Procedure Laterality Date    APPENDECTOMY      CARDIAC SURGERY      HYSTERECTOMY (CERVIX STATUS UNKNOWN)      INTRACAPSULAR CATARACT EXTRACTION Left 2019    EYE CATARACT EMULSIFICATION IOL IMPLANT performed by Alysa Chicas MD at [address] [place] [address]       Family History   Problem Relation Age of Onset    Other Mother         copd, emphysema    Colon Cancer Sister     Other Brother         copd, agent orange    Other Brother         agent orange     Social History     Socioeconomic History    Marital status:      Spouse name: Not on file    Number of children: Not on file    Years of education: Not on file    Highest education level: Not on file   Occupational History    Not on file   Tobacco Use    Smoking status: Former     Packs/day: 1.00     Years: 40.00     Pack years: 40.00     Types: Cigarettes     Quit date: 5/15/2015     Years since quittin.6    Smokeless tobacco: Never   Vaping Use    Vaping Use: Never used   Substance and Sexual Activity    Alcohol use: No     Alcohol/week: 0.0 standard drinks    Drug use: No    Sexual activity: Yes     Partners: Male   Other Topics Concern    Not on file   Social History Narrative    ** Merged History Encounter **          Social Determinants of Health     Financial Resource Strain: Not on file   Food Insecurity: Not on file   Transportation Needs: Not on file   Physical Activity: Not on file   Stress: Not on file   Social Connections: Not on file   Intimate Partner Violence: Not on file   Housing Stability: Not on file     No current facility-administered medications for this encounter. Current Outpatient Medications   Medication Sig Dispense Refill    naproxen (NAPROSYN) 375 MG tablet Take 1 tablet by mouth 2 times daily as needed for Pain 20 tablet 0    isosorbide mononitrate (IMDUR) 60 MG extended release tablet Take 1/2 (one-half) tablet by mouth once daily 30 tablet 3    lisinopril (PRINIVIL;ZESTRIL) 20 MG tablet Take 20 mg by mouth daily      predniSONE (DELTASONE) 5 MG tablet Take 5 mg by mouth daily      Dextromethorphan-guaiFENesin (MUCINEX DM PO) Take by mouth      carvedilol (COREG) 12.5 MG tablet TAKE 1/2 (ONE-HALF) TABLET BY MOUTH TWICE DAILY WITH MEALS 30 tablet 5    ibandronate (BONIVA) 150 MG tablet Take 1 tablet by mouth every 30 days Take one (1) tablet once per month in the morning with a full glass of water, on an empty stomach, and do not take anything else by mouth or lie down for the next 30 minutes.  1 tablet 11    atorvastatin (LIPITOR) 10 MG tablet Take 10 mg by mouth daily Taking 40mg (Patient not taking: Reported on 1/3/2023)      doxepin (SINEQUAN) 10 MG capsule TAKE 1 CAPSULE BY MOUTH NIGHTLY 30 capsule 11    albuterol sulfate HFA (PROVENTIL;VENTOLIN;PROAIR) 108 (90 Base) MCG/ACT inhaler Inhale 2 puffs into the lungs every 6 hours as needed for Wheezing 18 g 3    busPIRone (BUSPAR) 5 MG tablet Take 1 tablet by mouth twice daily 60 tablet 5    montelukast (SINGULAIR) 10 MG tablet Take 1 tablet by mouth once daily 30 tablet 11    Arformoterol Tartrate (BROVANA) 15 MCG/2ML NEBU Take 1 ampule by nebulization 2 times daily 120 mL 5    nitroGLYCERIN (NITROSTAT) 0.4 MG SL tablet Place 1 tablet under the tongue every 5 minutes as needed for Chest pain 25 tablet 3    docusate sodium (COLACE) 100 MG capsule Take 100 mg by mouth daily as needed       aspirin 81 MG chewable tablet Take 1 tablet by mouth daily 30 tablet 3     Allergies   Allergen Reactions    Seasonal Itching         ROS:    Review of Systems   Constitutional:  Negative for fatigue and fever. HENT:  Positive for congestion. Respiratory:  Positive for cough, shortness of breath and wheezing. Cardiovascular: Negative. All other systems reviewed and are negative. Nursing Notes Reviewed    Physical Exam:    /67   Pulse 89   Temp 98.3 °F (36.8 °C) (Oral)   Resp 16   Ht 5' 6\" (1.676 m)   Wt 102 lb 9.6 oz (46.5 kg)   LMP  (LMP Unknown)   SpO2 100%   BMI 16.56 kg/m²      ED Triage Vitals [01/03/23 1351]   Enc Vitals Group      BP (!) 154/92      Heart Rate 94      Resp 24      Temp 97.7 °F (36.5 °C)      Temp Source Axillary      SpO2 95 %      Weight 98 lb (44.5 kg)      Height 5' 5\" (1.651 m)      Head Circumference       Peak Flow       Pain Score       Pain Loc       Pain Edu? Excl. in [address]? Physical Exam  Vitals and nursing note reviewed. Constitutional:       Appearance: She is well-developed. She is cachectic. She is ill-appearing. HENT:      Head: Normocephalic and atraumatic. Right Ear: External ear normal.      Left Ear: External ear normal.      Mouth/Throat:      Mouth: Mucous membranes are moist.   Eyes:      General: No scleral icterus. Right eye: No discharge. Left eye: No discharge. Conjunctiva/sclera: Conjunctivae normal.      Pupils: Pupils are equal, round, and reactive to light. Neck:      Thyroid: No thyromegaly. Vascular: No JVD. Trachea: No tracheal deviation. Cardiovascular:      Rate and Rhythm: Normal rate and regular rhythm. Heart sounds: Normal heart sounds. No murmur heard. No friction rub. No gallop. Pulmonary:      Effort: Pulmonary effort is normal. No respiratory distress. Breath sounds: No stridor. Wheezing and rhonchi present. No rales. Chest:      Chest wall: No tenderness. Abdominal:      General: Bowel sounds are normal. There is no distension. Palpations: Abdomen is soft. There is no mass. Tenderness: There is no abdominal tenderness. There is no guarding or rebound. Hernia: No hernia is present. Musculoskeletal:         General: No tenderness, deformity or signs of injury. Normal range of motion. Cervical back: Normal range of motion and neck supple. Right lower leg: No edema. Left lower leg: No edema. Lymphadenopathy:      Cervical: No cervical adenopathy. Skin:     General: Skin is warm and dry. Coloration: Skin is not pale. Findings: No erythema or rash. Neurological:      Mental Status: She is alert and oriented to person, place, and time. Cranial Nerves: No cranial nerve deficit. Sensory: No sensory deficit. Deep Tendon Reflexes: Reflexes are normal and symmetric. Reflexes normal.   Psychiatric:         Mood and Affect: Mood normal.         Speech: Speech normal.         Behavior: Behavior normal.         Thought Content:  Thought content normal.         Judgment: Judgment normal.       I have reviewed and interpreted all of the currently available lab results from this visit (ifapplicable):  Results for orders placed or performed during the hospital encounter of 01/03/23   COVID-19, Rapid    Specimen: Nasopharyngeal   Result Value Ref Range    SARS-CoV-2, NAAT NOT DETECTED NOT DETECTED   Rapid Flu Swab    Specimen: Nasopharyngeal   Result Value Ref Range    Rapid Influenza A Ag NEGATIVE NEGATIVE    Rapid Influenza B Ag NEGATIVE NEGATIVE   Respiratory Panel, Molecular, with COVID-19 (Restricted: peds pts or suitable admitted adults)    Specimen: Nasopharyngeal   Result Value Ref Range    Adenovirus Detection by PCR NOT DETECTED NOT DETECTED    Coronavirus 229E PCR NOT DETECTED NOT DETECTED    Coronavirus HKU1 PCR NOT DETECTED NOT DETECTED    Coronavirus NL63 PCR NOT DETECTED NOT DETECTED    Coronavirus OC43 PCR NOT DETECTED NOT DETECTED    SARS-CoV-2 NOT DETECTED NOT DETECTED    Human Metapneumovirus PCR NOT DETECTED NOT DETECTED    Rhinovirus Enterovirus PCR NOT DETECTED NOT DETECTED    Influenza A by PCR NOT DETECTED NOT DETECTED    Influenza A H1 Pandemic PCR NOT DETECTED NOT DETECTED    Influenza A H1 (2009) PCR NOT DETECTED NOT DETECTED    Influenza A H3 PCR NOT DETECTED NOT DETECTED    Influenza B by PCR NOT DETECTED NOT DETECTED    Parainfluenza 1 PCR NOT DETECTED NOT DETECTED    Parainfluenza 2 PCR NOT DETECTED NOT DETECTED    Parainfluenza 3 PCR NOT DETECTED NOT DETECTED    Parainfluenza 4 PCR NOT DETECTED NOT DETECTED    RSV PCR NOT DETECTED NOT DETECTED    Bordetella parapertussis by PCR NOT DETECTED NOT DETECTED    B Pertussis by PCR NOT DETECTED NOT DETECTED    Chlamydophila Pneumonia PCR NOT DETECTED NOT DETECTED    Mycoplasma pneumo by PCR NOT DETECTED NOT DETECTED   Culture, Blood 1    Specimen: Blood   Result Value Ref Range    Specimen BLOOD     Special Requests NONE     Culture NO GROWTH AT 5 DAYS    Culture, Blood 2    Specimen: Blood   Result Value Ref Range    Specimen BLOOD     Special Requests NONE     Culture NO GROWTH AT 5 DAYS    Legionella antigen, urine    Specimen: Urine   Result Value Ref Range    Legionella Urinary Ag NEGATIVE NEGATIVE   CBC with Auto Differential   Result Value Ref Range    WBC 10.8 (H) 4.0 - 10.5 K/CU MM    RBC 4.13 (L) 4.2 - 5.4 M/CU MM    Hemoglobin 12.6 12.5 - 16.0 GM/DL    Hematocrit 37.8 37 - 47 %    MCV 91.5 78 - 100 FL    MCH 30.5 27 - 31 PG    MCHC 33.3 32.0 - 36.0 %    RDW 12.6 11.7 - 14.9 %    Platelets 958 (H) 734 - 440 K/CU MM    MPV 9.2 7.5 - 11.1 FL    Differential Type AUTOMATED DIFFERENTIAL     Segs Relative 71.3 (H) 36 - 66 %    Lymphocytes % 16.2 (L) 24 - 44 %    Monocytes % 9.4 (H) 0 - 4 %    Eosinophils % 1.7 0 - 3 %    Basophils % 0.8 0 - 1 %    Segs Absolute 7.7 K/CU MM    Lymphocytes Absolute 1.8 K/CU MM    Monocytes Absolute 1.0 K/CU MM Eosinophils Absolute 0.2 K/CU MM    Basophils Absolute 0.1 K/CU MM    Immature Neutrophil % 0.6 (H) 0 - 0.43 %    Total Immature Neutrophil 0.07 K/CU MM   BMP   Result Value Ref Range    Sodium 130 (L) 135 - 145 MMOL/L    Potassium 5.0 3.5 - 5.1 MMOL/L    Chloride 91 (L) 99 - 110 mMol/L    CO2 26 21 - 32 MMOL/L    Anion Gap 13 4 - 16    BUN 19 6 - 23 MG/DL    Creatinine 0.2 (L) 0.6 - 1.1 MG/DL    Est, Glom Filt Rate >60 >60 mL/min/1.73m2    Glucose 110 (H) 70 - 99 MG/DL    Calcium 9.1 8.3 - 10.6 MG/DL   Lactate, Sepsis   Result Value Ref Range    Lactic Acid, Sepsis 1.9 0.5 - 1.9 mMOL/L   Lactate, Sepsis   Result Value Ref Range    Lactic Acid, Sepsis 1.3 0.5 - 1.9 mMOL/L   Troponin   Result Value Ref Range    Troponin T <0.010 <0.01 NG/ML   Troponin   Result Value Ref Range    Troponin T <0.010 <0.01 NG/ML   Troponin   Result Value Ref Range    Troponin T <0.010 <0.01 NG/ML   Troponin   Result Value Ref Range    Troponin T <0.010 <0.01 NG/ML   Comprehensive Metabolic Panel w/ Reflex to MG   Result Value Ref Range    Sodium 130 (L) 135 - 145 MMOL/L    Potassium 4.5 3.5 - 5.1 MMOL/L    Chloride 93 (L) 99 - 110 mMol/L    CO2 25 21 - 32 MMOL/L    BUN 14 6 - 23 MG/DL    Creatinine 0.2 (L) 0.6 - 1.1 MG/DL    Est, Glom Filt Rate >60 >60 mL/min/1.73m2    Glucose 135 (H) 70 - 99 MG/DL    Calcium 8.7 8.3 - 10.6 MG/DL    Albumin 3.3 (L) 3.4 - 5.0 GM/DL    Total Protein 5.9 (L) 6.4 - 8.2 GM/DL    Total Bilirubin 0.3 0.0 - 1.0 MG/DL    ALT 9 (L) 10 - 40 U/L    AST 10 (L) 15 - 37 IU/L    Alkaline Phosphatase 52 40 - 129 IU/L    Anion Gap 12 4 - 16   CBC auto differential   Result Value Ref Range    WBC 6.8 4.0 - 10.5 K/CU MM    RBC 3.61 (L) 4.2 - 5.4 M/CU MM    Hemoglobin 10.9 (L) 12.5 - 16.0 GM/DL    Hematocrit 33.0 (L) 37 - 47 %    MCV 91.4 78 - 100 FL    MCH 30.2 27 - 31 PG    MCHC 33.0 32.0 - 36.0 %    RDW 12.5 11.7 - 14.9 %    Platelets 806 (H) 962 - 440 K/CU MM    MPV 9.3 7.5 - 11.1 FL    Differential Type AUTOMATED DIFFERENTIAL     Segs Relative 68.7 (H) 36 - 66 %    Lymphocytes % 21.9 (L) 24 - 44 %    Monocytes % 8.4 (H) 0 - 4 %    Eosinophils % 0.0 0 - 3 %    Basophils % 0.3 0 - 1 %    Segs Absolute 4.7 K/CU MM    Lymphocytes Absolute 1.5 K/CU MM    Monocytes Absolute 0.6 K/CU MM    Eosinophils Absolute 0.0 K/CU MM    Basophils Absolute 0.0 K/CU MM    Immature Neutrophil % 0.7 (H) 0 - 0.43 %    Total Immature Neutrophil 0.05 K/CU MM   Lipid Panel   Result Value Ref Range    Triglycerides 69 <150 MG/DL    Cholesterol 156 <200 MG/DL    HDL 19 (L) >40 MG/DL    LDL Calculated 123 (H) <100 MG/DL   Procalcitonin   Result Value Ref Range    Procalcitonin 0.057    POCT Venous   Result Value Ref Range    pH, Derrick 7.38 7.32 - 7.42    pCO2, Derrick 44.1 38 - 52 mmHG    pO2, Derrick 23.3 (L) 28 - 48 mmHG    Base Exc, Mixed 0.4 0 - 2.3    Base Excess HIDE 0 - 2.4    HCO3, Venous 26.0 (H) 19 - 25 MMOL/L    O2 Sat, Derrick 39.2 (L) 50 - 70 %    CO2 Content 27.3 (H) 19 - 24 MMOL/L    Source: Venous    EKG 12 Lead   Result Value Ref Range    Ventricular Rate 90 BPM    Atrial Rate 90 BPM    P-R Interval 192 ms    QRS Duration 116 ms    Q-T Interval 426 ms    QTc Calculation (Bazett) 521 ms    P Axis 39 degrees    R Axis -63 degrees    T Axis 228 degrees    Diagnosis       Normal sinus rhythm  Left axis deviation  Left bundle branch block  Inferior infarct , age undetermined  Anteroseptal infarct , age undetermined  ST & T wave abnormality, consider lateral ischemia  Prolonged QT  Abnormal ECG  When compared with ECG of 17-AUG-2022 21:21,  No significant change was found    Confirmed by [facility] MD, [facility] ([id number]) on 1/4/2023 1:37:09 PM        Radiographs (if obtained):  [] The following radiograph wasinterpreted by myself in the absence of a radiologist:   [] Radiologist's Report Reviewed:  XR CHEST PORTABLE   Final Result   Bibasilar linear pulmonary opacity suggesting pneumonia               EKG (if obtained): (All EKG's are interpreted by myself in the absence of a cardiologist)    The 12 lead EKG was interpreted by me, and the interpretation is as follows:  normal sinus rhythm, rate = 90. Intervals are within the normal range. QTc is  prolonged. ST elevations are not present. T wave inversions are present. NEW V5/ V6  Non-specific T wave changes are present. Delta waves, Brugada Syndrome, and Short KS are not present. There is acute ischemia. 8  It was compared against an EKG from 8-17-22 with acute changes. Chart review shows recent radiographs:  XR CHEST (2 VW)    Addendum Date: 12/6/2022    ADDENDUM: Findings were discussed with Aspen Franklin at 6:46 pm on 12/6/2022. Result Date: 12/6/2022  EXAMINATION: TWO XRAY VIEWS OF THE CHEST 12/6/2022 2:49 pm COMPARISON: 10/25/2022 HISTORY: ORDERING SYSTEM PROVIDED HISTORY: Fall; LEFT rib pain TECHNOLOGIST PROVIDED HISTORY: Reason for exam:->Fall; LEFT rib pain Reason for Exam: Fall; LEFT rib pain Additional signs and symptoms: Fall; LEFT rib pain Relevant Medical/Surgical History: Fall; LEFT rib pain FINDINGS: Cardial pericardial silhouette is unremarkable. Focal infiltrates seen within the right middle lobe and/or in the right upper lobe, new when compared to the previous exam.  The left lung is clear. No pneumothorax is seen. No free air. Multiple compression fractures are noted within the thoracic spine. There is a questionable transverse fracture within the T9 vertebral body, which is not definitely visualized on the previous exam.     Possible transverse fracture within the T9 vertebral body, which is potentially unstable if this is a true finding. Thoracic spine CT is recommended to better characterize that.  Infiltrate in the right middle lobe and/or the right upper lobe, concerning for pneumonia, but atelectasis remains in the differential.  It is new when compared to the previous exam.     CT THORACIC SPINE WO CONTRAST    Result Date: 12/6/2022  EXAMINATION: CT OF THE THORACIC SPINE WITHOUT CONTRAST  12/6/2022 7:17 pm: TECHNIQUE: CT of the thoracic spine was performed without the administration of intravenous contrast. Multiplanar reformatted images are provided for review. Automated exposure control, iterative reconstruction, and/or weight based adjustment of the mA/kV was utilized to reduce the radiation dose to as low as reasonably achievable. COMPARISON: 09/17/2018 HISTORY: ORDERING SYSTEM PROVIDED HISTORY: ? T9 fx per rad TECHNOLOGIST PROVIDED HISTORY: Reason for exam:->? T9 fx per rad Reason for Exam: ? T9 fx per rad Additional signs and symptoms: ? T9 fx per rad FINDINGS: BONES/ALIGNMENT: Diffuse osteopenia. This was noted on the previous study. Multiple compressions are seen of thoracic spine. Compression of T6, T7, T8, T10, T11, T12-L1 and L2. These have worsened since the previous study. T8 is vertebral plana. However, the abnormality seen on the radiograph is not appreciated on the CT scan. DEGENERATIVE CHANGES: Facet arthropathy at multiple levels. Anterior spurring multiple levels. SOFT TISSUES: Negative for paravertebral hematoma. Atherosclerotic changes of the aorta and the great vessels. No visualized aneurysm. Heart size is enlarged. No pleural effusions. The lung windows were reviewed. Extensive emphysema. Nodule in the right upper lobe which measures approximately 4.2 mm x 3.2 mm. Consolidation in the right upper lobe anteriorly. Paraseptal emphysema. Subpleural nodule in the right lower lobe. Is 1.2 by 0.7 cm. This is stable mild bibasilar opacities. 1. Multiple thoracic compressions but these appear chronic. 2. Consolidation in the right upper lobe. Follow-up radiographs to resolution are recommended. 3. The nodule in the right upper lobe may be minimally more prominent. Follow-up chest CT in 6 months is recommended       MDM:               ED Course and Summary: Patient presents today with a cough increasing shortness of breath.   She was found to be influenza A and COVID-negative. The patient is on oxygen she is tachypneic dipstick chest x-ray reveals some platelike changes concerning for possible I CAP in the bilateral lower lungs. .  I am no suspicion for PE.  EKG reveals isolated T wave inversion lead V5 V6 this could be due to strain. This could be from her COPD. I discussed the case with Dr. Angelina Eldridge cardiology since her troponin is negative he agrees with keeping her local at the [facility].  Empiric antibiotics were given for the pneumonia. COPD interventions were performed as well        CRITICAL CARE NOTE:  There was a high probability of clinically significant life-threatening deterioration of the patient's condition requiring my urgent intervention due to increased work of breathing, and tachycardia. Order interpretation of labs and imaging, reevaluation was performed to address this. Total critical care time is AT LEAST 35 min  This includes vital sign monitoring, pulse oximetry monitoring, telemetry monitoring, clinical response to the IV medications, reviewing the nursing notes, consultation time, dictation/documentation time, and interpretation of the lab work. This time excludes time spent performing procedures and separately billable procedures and family discussion time.                 History from : Patient    Limitations to history : None    Patient was given the following medications:  Medications   methylPREDNISolone sodium (SOLU-MEDROL) injection 125 mg (125 mg IntraVENous Given 1/3/23 1444)   ipratropium-albuterol (DUONEB) nebulizer solution 1 ampule (1 ampule Inhalation Given 1/3/23 1418)   aspirin chewable tablet 324 mg (324 mg Oral Given 1/3/23 1511)   levoFLOXacin (LEVAQUIN) tablet 750 mg (750 mg Oral Given 1/4/23 1500)       Imaging Interpretation by Cxr: bilateral lower lobe airspace dz interp by me      Chronic conditions affecting care: COPD/ CAD    Discussion with Other Profesionals : Consultant Dr June Isabel Determinants : None    Records Reviewed : Outpatient Notes recent PCP/ NP notes     Disposition Considerations:   Admit    I am the Primary Clinician of Record. Is this patient to be included in the SEP-1 Core Measure due to severe sepsis or septic shock? No   Exclusion criteria - the patient is NOT to be included for SEP-1 Core Measure due to:  Not septic        Clinical Impression:  1. COPD exacerbation ([address]    2. EKG abnormality      Disposition referral (if applicable):  Jesse Astorga PA-C  [address] [facility]  [address]  [place] [address]  [phone]    Schedule an appointment as soon as possible for a visit in 1 week(s)  hospital follow-up    [facility], [place]  [address] [facility]  [address]  [phone]    Schedule an appointment as soon as possible for a visit  hospital follow-up for COPD    Presley Hagen MD  [address] [address] [phone]    Schedule an appointment as soon as possible for a visit in 1 week(s)  abnormal EKG    [address], [address]  [address] [facility]   [phone]        Disposition medications (if applicable):  Discharge Medication List as of 1/4/2023  2:52 PM        START taking these medications    Details   levoFLOXacin (LEVAQUIN) 750 MG tablet Take 1 tablet by mouth daily for 4 days, Disp-4 tablet, R-0Normal      !! predniSONE (DELTASONE) 20 MG tablet Take 2 tablets by mouth daily for 4 days, Disp-8 tablet, R-0Normal       !! - Potential duplicate medications found. Please discuss with provider. Mandeep Morley DO, FACEP      Comment: Please note this report has been produced using speech recognition software and maycontain errors related to that system including errors in grammar, punctuation, and spelling, as well as words and phrases that may be inappropriate.  If there are any questions or concerns please feel free to contact thedictating provider for clarification.         Savannah Blair,   01/09/23 0106

## 2023-01-03 NOTE — ASSESSMENT & PLAN NOTE
Uncontrolled, changes made today: sent to ED. add Trelegy. prednison and zithromax ordered but pt decided to go to Ed.

## 2023-01-03 NOTE — ASSESSMENT & PLAN NOTE
Uncontrolled, changes made today: add trelegy. FU with Pulm. pt appears tired, worn out. very short of breath. agreeable to ED today.

## 2023-01-03 NOTE — CONSULTS
CARDIOLOGY INITIAL VISJAYCE Graff [facility]  1950      Referring physician:   Antonio Fraire MD     Primary care physician:  Ranelle Felty, PA-C    Reason for consultation: Abnormal EKG in this patient with known coronary artery disease. History of present illness: 26-year-old female admitted with the worsening shortness of breath and cough with the green-colored sputum going on for the last 2 weeks. She has COPD and has been on home oxygen 2 L/min flow rate for the last 4 to 5 years. She quit smoking in 2015 at the time she had heart attack and bypass surgery and has been following up with Dr. Corby Luo for COPD and chronic hypoxemic respiratory failure. She reports her chest hurts when she coughs and when she takes a deep breath. She did not have any fever but felt chills. In hospital her  is there contributing information on her behalf as she gets very short of breath and starts coughing when she talks. She follows up with Dr. Mcmillan Mantel my colleague for cardiology needs regularly. Records are reviewed and her medications are reviewed. REVIEW OF SYSTEMS: Multisystem review is negative for TIA CVA syncope or near syncope. Past medical history:  has a past medical history of Acute on chronic respiratory failure with hypoxia ([address] Age-related osteoporosis with current pathological fracture with routine healing, Chronic systolic heart failure (HCC), Compression fracture of thoracic vertebra with routine healing, COPD (chronic obstructive pulmonary disease) ([address] Coronary artery disease involving native coronary artery of native heart without angina pectoris, Hyperlipidemia, Mild pulmonary arterial systolic hypertension ([address] Mitral regurgitation, Pneumonia, Primary hypertension, and S/P CABG x 4. Past surgical history:  has a past surgical history that includes knee surgery; sinus surgery; Appendectomy; Hysterectomy;  Tonsillectomy; Cardiac surgery; and Intracapsular cataract extraction (Left, 9/26/2019). Social History:  reports that she quit smoking about 7 years ago. Her smoking use included cigarettes. She has a 40.00 pack-year smoking history. She has never used smokeless tobacco. She reports that she does not drink alcohol and does not use drugs. Family history: family history includes Colon Cancer in her sister; Other in her brother, brother, and mother.     Allergies   Allergen Reactions    Seasonal Itching       Current Facility-Administered Medications   Medication Dose Route Frequency Provider Last Rate Last Admin    levoFLOXacin (LEVAQUIN) 750 MG/150ML infusion 750 mg  750 mg IntraVENous Q24H Karl Zhou DO   Stopped at 01/03/23 1632    [START ON 1/4/2023] aspirin EC tablet 81 mg  81 mg Oral Daily Mirtha Jaquez PA-C        atorvastatin (LIPITOR) tablet 10 mg  10 mg Oral Nightly Mirtha Jaquez PA-C        busPIRone (BUSPAR) tablet 5 mg  5 mg Oral BID Mirtha Jaquez PA-C        carvedilol (COREG) tablet 6.25 mg  6.25 mg Oral BID WC Mirtha Jaquez PA-C        [Held by provider] doxepin (SINEQUAN) capsule 10 mg  10 mg Oral Nightly Mirtha Jaquez PA-C        [START ON 1/4/2023] isosorbide mononitrate (IMDUR) extended release tablet 30 mg  30 mg Oral Daily Mirtha Jaquez PA-C        [START ON 1/4/2023] lisinopril (PRINIVIL;ZESTRIL) tablet 20 mg  20 mg Oral Daily Mirtha Jaquez PA-C        nitroGLYCERIN (NITROSTAT) SL tablet 0.4 mg  0.4 mg SubLINGual Q5 Min PRN Mirtha Jaquez PA-C        sodium chloride flush 0.9 % injection 10 mL  10 mL IntraVENous 2 times per day Mirtha Jaquez PA-C        sodium chloride flush 0.9 % injection 10 mL  10 mL IntraVENous PRN Adrian Jaquez PA-C        0.9 % sodium chloride infusion   IntraVENous PRN Adrian Irene Jaquez PA-C        promethazine (PHENERGAN) tablet 12.5 mg  12.5 mg Oral Q6H PRN Mirtha Jaquez PA-C        acetaminophen (TYLENOL) tablet 650 mg  650 mg Oral Q6H PRN Jeffrey Malloy PA-C Or    acetaminophen (TYLENOL) suppository 650 mg  650 mg Rectal Q6H PRN Katey Guevara PA-C        magnesium hydroxide (MILK OF MAGNESIA) 400 MG/5ML suspension 30 mL  30 mL Oral Daily PRN Katey Guevara PA-C        ipratropium-albuterol (DUONEB) nebulizer solution 1 ampule  1 ampule Inhalation Q4H WA Mirtha Jaquez PA-C        [START ON 1/4/2023] methylPREDNISolone sodium (SOLU-MEDROL) injection 40 mg  40 mg IntraVENous Q12H Mirtha Jaquez PA-C        enoxaparin Sodium (LOVENOX) injection 30 mg  30 mg SubCUTAneous QPM Mirtha Jaquez PA-C        budesonide (PULMICORT) nebulizer suspension 250 mcg  250 mcg Nebulization BID Katey Guevara PA-C         Physical Examination:      Vitals:    01/03/23 1433 01/03/23 1449 01/03/23 1533 01/03/23 1602   BP: 132/78 111/78 (!) 127/93 130/76   Pulse: 93 89 92 96   Resp:       Temp:       TempSrc:       SpO2: 98% 95% 94% 95%   Weight:       Height:         Wt Readings from Last 3 Encounters:   01/03/23 98 lb (44.5 kg)   01/03/23 98 lb (44.5 kg)   12/06/22 104 lb (47.2 kg)     General appearance: Thin built female in mild to moderate respiratory distress when she talks. Eyes: No conjunctival pallor noted    NECK: No JVP or thyromegaly    Cardiovascular: Heart sounds are distant without any murmur, gallop or rub. Bruits noted in both carotids. Chest wall tenderness noted    Respiratory:  Respiratory effort is laborious particularly when she is talking. Breath sounds are markedly diminished with bilateral rhonchi without any wheezing. Extremities:  No edema, clubbing,  Cyanosis, petechiae. SKIN: Warm and well perfused, no pallor or cyanosis    Abdomen:  No masses or tenderness. No organomegaly noted. Neurologic:  Oriented to time, place, and person and non-anxious. No focal neurological deficit noted.     Psychiatric: Judgment/Insight and Mood is normal    Lab Review   Recent Labs     01/03/23  1415   WBC 10.8*   HGB 12.6   HCT 37.8   *      Recent Labs 01/03/23  1415   *   K 5.0   CL 91*   CO2 26   BUN 19   CREATININE 0.2*     Lab Results   Component Value Date    CHOLFAST 137 04/13/2018    TRIGLYCFAST 91 04/13/2018    HDL 37 (L) 06/04/2021    LDLCALC 96 06/04/2021     Recent Labs     01/03/23  1440   TROPONINT <0.010     Lab Results   Component Value Date    PROBNP 419.1 (H) 08/17/2022    PROBNP 763 (H) 08/05/2021    PROBNP 2,097 (H) 07/20/2021     No results found for: BNP  Lab Results   Component Value Date    INR 1.02 07/19/2021    PROTIME 12.7 07/19/2021     Lab Results   Component Value Date    LABA1C 5.0 10/05/2021     Lab Results   Component Value Date     05/16/2015     EKG: Normal sinus rhythm 90 bpm with left bundle branch block and primary T wave inversions noted in lead V 5-6 which is new compared to previous EKG from 8/17/2022. Chest Xray:  Bibasilar linear pulmonary opacity suggesting pneumonia    Echocardiogram on 12/8/2020 reported  Left ventricular systolic function is moderately depressed with an ejection   fraction of 35 to 40 %. Anterior wall hypokinesis & Septal wall hypokinesis noted. Mild septal wall asymmetrical left ventricular hypertrophy. The left atrium is mildly dilated. Mild thickening of anterior/posterior leaflets of mitral valve. Right ventricular systolic pressure of 46 mmHg consistent with mild to   moderate pulmonary hypertension. No evidence of pericardial effusion. Nuclear stress test on 11/3/2020 reported  Normal perfusion study with normal distribution in all coronal, short, and  horizontal axis. LVEF is low probably due to PVCs     Assessment and Recommendations:     Active Hospital Problems    Diagnosis Date Noted    Abnormal finding on EKG [R94.31] 01/03/2023     Priority: Medium    Primary hypertension [I10] 01/03/2023     Priority: Medium    Chest wall pain [R07.89] 01/03/2023     Priority: Medium    COPD exacerbation ([facility] [J44.1] 08/17/2022     Priority: Medium    S/P CABG x 4 [Z95.1] 05/18/2015     Priority: Medium     Chest discomfort is most likely related to her forceful cough for the last 2 weeks. Your EKG changes could be also secondary to COPD exacerbation however need to have serial troponins to rule out subendocardial injury. And normal BNP rules out CHF contributing to COPD exacerbation. Treat pneumonia and respiratory condition aggressively with antibiotics and steroids and call us if her troponins are elevated for further recommendations until then continue carvedilol and low-dose aspirin. If troponins are negative during this hospitalization she can be followed as an outpatient with Dr. Lopez Latif. Care discussed with the nurse in charge and questions addressed. Discussed with patient and family and questions answered    Kenia Hammer MD, 1/3/2023 6:25 PM     Please note this report has been produced using speech recognition software and may contain errors related to that system including errors in grammar, punctuation, and spelling, as well as words and phrases that may be inappropriate.  If there are any questions or concerns please feel free to contact the dictating provider for clarification

## 2023-01-03 NOTE — H&P
History and Physical      Name:  Jam Melgar /Age/Sex: 1950  (67 y.o. female)   MRN & CSN:  [id number] & [id number] Encounter Date/Time: 1/3/2023 4:08 PM EST   Location:   PCP: Emile Esteban, [facility] Day: 1    Assessment and Plan:     Jam Melgar is a 67 y.o. female who presents with a chief complaint of cough. Medical decision making:  COPD Exacerbation and bilateral pneumonia  No signs of sepsis. On her baseline 2 L of oxygen. Endorses productive cough. Negative COVID and flu. Chest x-ray with bibasilar opacities. Tele/pulse ox monitoring  NC oxygen to keep O2> 92%  PRN breathing treatments  Started on Levaquin in ER, continue  Continue Solu-Medrol with transition to prednisone when able  Procalcitonin, respiratory disease panel, Legionella, strep, blood cultures, sputum culture pending    Chest pain r/o ACS  In setting of CAD, CABG x4. CXR with pneumonia but no acute cardiac findings. EKG with T wave inversions in leads 5 and 6 that are new. Patient denies chest pain except only when coughing  Had previous cardiac work-up in  with echo that showed EF of 35 to 40% and normal stress test.  Last heart cath in  showed severe CAD.   Initial troponin negative, trend  Tele monitoring  Cardiology consulted in ER, will see in consult  Continue home ASA/BB/Statin  Nitroglycerin sublingual PRN  Echo pending    Chronic hypoxic respiratory failure secondary to COPD  Baseline of 2 L off oxygen    Chronic systolic heart failure  Echo as stated above  Continue Coreg and lisinopril  Strict ESTEBAN  Repeat echo pending    CAD status post CABG  Continue home aspirin, statin, Imdur    Essential hypertension  Continue Coreg and lisinopril    Pulmonary artery hypertension    Disposition:   Current Living situation: Home with   Expected Disposition: Home  Estimated D/C: Several days    Diet Regular then n.p.o. at midnight   DVT Prophylaxis [x] Lovenox, []  Heparin, [] SCDs, [] Ambulation,  [] Eliquis, [] Xarelto   Code Status Full   Surrogate Decision Maker/ POA Family     History from:   Patient and EMR   History of Present Illness:   Chief Complaint: cough   Miroslava Fagan is a 67 y.o. female with pmh of COPD, chronic systolic heart failure, pulmonary artery hypertension, CABG x4, CAD who presents to the ER for evaluation of cough for the past several weeks. Patient went to her PCP this morning and was sent here for further evaluation. Patient describes cough for several weeks that is purulent in nature. She wears 2 L of oxygen at home due to COPD and has not had increased this. She describes generalized weakness. She describes decreased appetite although she is still taking her medications and tolerating food. She denies fevers or chills. She describes feeling weak. She states she is able to walk short distances still but becomes winded. She denies any swelling in her lower extremities or weight gain. She denies headache, neck pain, abdominal pain, bowel or bladder issues. Patient's past medical, social, and family history have been reviewed. Patient case discussed with ED provider Dr. Lupillo Spivey of Systems:    10 point system reviewed, negative, unless as noted in above HPI. Objective:   No intake or output data in the 24 hours ending 01/03/23 1608   Vitals:   Vitals:    01/03/23 1433 01/03/23 1449 01/03/23 1533 01/03/23 1602   BP: 132/78 111/78 (!) 127/93 130/76   Pulse: 93 89 92 96   Resp:       Temp:       TempSrc:       SpO2: 98% 95% 94% 95%   Weight:       Height:         Medications Prior to Admission     Prior to Admission medications    Medication Sig Start Date End Date Taking?  Authorizing Provider   predniSONE (DELTASONE) 20 MG tablet Take 2 tablets by mouth daily for 5 days 1/3/23 1/8/23  Orpha Carry, APRN - CNP   azithromycin (ZITHROMAX) 250 MG tablet Take 1 tablet by mouth See Admin Instructions for 5 days 500mg on day 1 followed by 250mg on days 2 - 5 1/3/23 1/8/23  Navid Velasquez APRN - CNP   fluticasone-umeclidin-vilant (TRELEGY ELLIPTA) 371-12.5-11 MCG/ACT AEPB inhaler Inhale 1 puff into the lungs daily 1/3/23   CHEPE Bernal - CNP   naproxen (NAPROSYN) 375 MG tablet Take 1 tablet by mouth 2 times daily as needed for Pain 12/6/22   Kannan De Los Santos DO   isosorbide mononitrate (IMDUR) 60 MG extended release tablet Take 1/2 (one-half) tablet by mouth once daily 9/26/22   Kya Elizabeth MD   lisinopril (PRINIVIL;ZESTRIL) 20 MG tablet Take 20 mg by mouth daily    Historical Provider, MD   predniSONE (DELTASONE) 5 MG tablet Take 5 mg by mouth daily    Historical Provider, MD   Dextromethorphan-guaiFENesin ([address] DM PO) Take by mouth    Historical Provider, MD   carvedilol (COREG) 12.5 MG tablet TAKE 1/2 (ONE-HALF) TABLET BY MOUTH TWICE DAILY WITH MEALS 8/22/22   Lanny Burris PA-C   ibandronate (BONIVA) 150 MG tablet Take 1 tablet by mouth every 30 days Take one (1) tablet once per month in the morning with a full glass of water, on an empty stomach, and do not take anything else by mouth or lie down for the next 30 minutes.  8/19/22   Lanny Burris PA-C   atorvastatin (LIPITOR) 10 MG tablet Take 10 mg by mouth daily Taking 40mg    Historical Provider, MD   doxepin (SINEQUAN) 10 MG capsule TAKE 1 CAPSULE BY MOUTH NIGHTLY 8/3/22   Lanny Burris PA-C   albuterol sulfate HFA (PROVENTIL;VENTOLIN;PROAIR) 108 (90 Base) MCG/ACT inhaler Inhale 2 puffs into the lungs every 6 hours as needed for Wheezing 7/26/22   Chinmay Laguerre MD   busPIRone (BUSPAR) 5 MG tablet Take 1 tablet by mouth twice daily 7/12/22   Lanny Burris PA-C   montelukast (SINGULAIR) 10 MG tablet Take 1 tablet by mouth once daily 2/15/22   Lanny Burris PA-C   Arformoterol Tartrate (BROVANA) 15 MCG/2ML NEBU Take 1 ampule by nebulization 2 times daily 1/25/21   CHEPE Richter CNP   nitroGLYCERIN (NITROSTAT) 0.4 MG SL tablet Place 1 tablet under the tongue every 5 minutes as needed for Chest pain 1/5/21   Camryn Dailey PA-C   docusate sodium (COLACE) 100 MG capsule Take 100 mg by mouth daily as needed     Historical Provider, MD   aspirin 81 MG chewable tablet Take 1 tablet by mouth daily 6/3/15   Fabrizio Mac MD     Physical Exam:      GEN -Awake. NAD. Appears given age. EYES -PERRLA. No scleral erythema, discharge, or conjunctivitis. HENT -MM are moist. Oral pharynx without exudates, no evidence of thrush. NECK -Supple, no apparent thyromegaly or masses. RESP -wheezing throughout lung fields. Symmetric chest movement while on supplemental oxygen. C/V -S1/S2 auscultated. RRR without appreciable M/R/G. No JVD or carotid bruits. Peripheral pulses equal bilaterally and palpable. Cap refill <3 sec. No peripheral edema. GI -Abdomen is soft non distended and without significant tenderness to palpation. + BS. No masses or guarding.  -No CVA/ flank tenderness. [place] catheter is not present. LYMPH-No palpable cervical lymphadenopathy and no hepatosplenomegaly. No petechiae or ecchymoses. MS -No gross joint deformities. SKIN -Normal coloration, warm, dry. Lata Stallion, alert, oriented x  person, place, time, situation. Cranial nerves appear grossly intact, normal speech, no lateralizing weakness. PSYC- Appropriate affect.     Past Medical History:   PMHx   Past Medical History:   Diagnosis Date    Acute on chronic respiratory failure with hypoxia ([facility] [address] [address] 07/19/2021    Age-related osteoporosis with current pathological fracture with routine healing 10/05/2021    Chronic systolic heart failure (HCC)     Compression fracture of thoracic vertebra with routine healing 08/06/2021    COPD (chronic obstructive pulmonary disease) (HCC)     Coronary artery disease involving native coronary artery of native heart without angina pectoris     Hyperlipidemia     Mild pulmonary arterial systolic hypertension (HCC)     Mitral regurgitation     Pneumonia     Primary hypertension     S/P CABG x 4 2015    [place] to LAD & Diag, SVG to Post lat RCA & Ramus     PSHX:  has a past surgical history that includes knee surgery; sinus surgery; Appendectomy; Hysterectomy; Tonsillectomy; Cardiac surgery; and Intracapsular cataract extraction (Left, 2019). Allergies: Allergies   Allergen Reactions    Seasonal Itching     Fam HX: family history includes Colon Cancer in her sister; Other in her brother, brother, and mother. Soc HX:   Social History     Socioeconomic History    Marital status:      Spouse name: None    Number of children: None    Years of education: None    Highest education level: None   Tobacco Use    Smoking status: Former     Packs/day: 1.00     Years: 40.00     Pack years: 40.00     Types: Cigarettes     Quit date: 5/15/2015     Years since quittin.6    Smokeless tobacco: Never   Vaping Use    Vaping Use: Never used   Substance and Sexual Activity    Alcohol use: No     Alcohol/week: 0.0 standard drinks    Drug use: No    Sexual activity: Yes     Partners: Male   Social History Narrative    ** Merged History Encounter **          Medications:   Medications:    levofloxacin  750 mg IntraVENous Q24H      Infusions:   PRN Meds:    Labs    XR CHEST PORTABLE    Result Date: 1/3/2023  EXAMINATION: ONE XRAY VIEW OF THE CHEST 1/3/2023 1:59 pm COMPARISON: 2022 HISTORY: ORDERING SYSTEM PROVIDED HISTORY: sob TECHNOLOGIST PROVIDED HISTORY: Reason for exam:->sob Reason for Exam: sob Additional signs and symptoms: sob FINDINGS: Status post median sternotomy. Heart size stable. Bibasilar linear pulmonary opacities. No pneumothorax. No pulmonary vascular congestion or edema.      Bibasilar linear pulmonary opacity suggesting pneumonia       CBC:   Recent Labs     23  1415   WBC 10.8*   HGB 12.6   *     BMP:    Recent Labs     23  1415   *   K 5.0   CL 91*   CO2 26   BUN 19   CREATININE 0.2*   GLUCOSE 110*     Hepatic: No results for input(s): AST, ALT, ALB, BILITOT, ALKPHOS in the last 72 hours. Lipids:   Lab Results   Component Value Date/Time    CHOL 147 06/04/2021 03:51 PM    HDL 37 06/04/2021 03:51 PM    TRIG 72 06/04/2021 03:51 PM     Hemoglobin A1C:   Lab Results   Component Value Date/Time    LABA1C 5.0 10/05/2021 01:54 PM     TSH: No results found for: TSH  Troponin:   Lab Results   Component Value Date/Time    TROPONINT <0.010 01/03/2023 02:40 PM    TROPONINT <0.010 08/17/2022 09:00 PM    TROPONINT <0.010 10/11/2021 03:09 PM     Lactic Acid: No results for input(s): LACTA in the last 72 hours. BNP: No results for input(s): PROBNP in the last 72 hours. UA:  Lab Results   Component Value Date/Time    NITRU NEGATIVE 07/19/2021 01:19 PM    COLORU yellow 03/24/2022 12:33 PM    COLORU YELLOW 07/19/2021 01:19 PM    PHUR 7.0 03/24/2022 12:33 PM    WBCUA NEGATIVE 07/19/2021 01:19 PM    RBCUA 1 07/19/2021 01:19 PM    MUCUS NEGATIVE 12/29/2016 09:00 PM    BACTERIA NEGATIVE 07/19/2021 01:19 PM    CLARITYU clear 03/24/2022 12:33 PM    CLARITYU CLEAR 07/19/2021 01:19 PM    SPECGRAV 1.020 03/24/2022 12:33 PM    SPECGRAV 1.010 07/19/2021 01:19 PM    LEUKOCYTESUR small 03/24/2022 12:33 PM    LEUKOCYTESUR NEGATIVE 07/19/2021 01:19 PM    UROBILINOGEN 0.2 07/19/2021 01:19 PM    BILIRUBINUR neg 03/24/2022 12:33 PM    BLOODU neg 03/24/2022 12:33 PM    BLOODU NEGATIVE 07/19/2021 01:19 PM    GLUCOSEU neg 03/24/2022 12:33 PM    KETUA neg 03/24/2022 12:33 PM    KETUA NEGATIVE 07/19/2021 01:19 PM     Urine Cultures:   Lab Results   Component Value Date/Time    LABURIN  03/24/2022 12:35 PM     75,000 CFU/ml  Susceptibility testing of penicillin and other beta lactams is  not necessary for beta hemolytic Streptococci since resistant  strains have not been identified.  (CLSI M100)       Blood Cultures: No results found for: BC  No results found for: BLOODCULT2  Organism:   Lab Results   Component Value Date/Time    ORG Strep agalactiae (Beta Strep Group B) 03/24/2022 12:35 PM     Imaging/Diagnostics Last 24 Hours   XR CHEST PORTABLE    Result Date: 1/3/2023  EXAMINATION: ONE XRAY VIEW OF THE CHEST 1/3/2023 1:59 pm COMPARISON: 12/06/2022 HISTORY: ORDERING SYSTEM PROVIDED HISTORY: sob TECHNOLOGIST PROVIDED HISTORY: Reason for exam:->sob Reason for Exam: sob Additional signs and symptoms: sob FINDINGS: Status post median sternotomy. Heart size stable. Bibasilar linear pulmonary opacities. No pneumothorax. No pulmonary vascular congestion or edema.      Bibasilar linear pulmonary opacity suggesting pneumonia       Personally reviewed Lab Studies, Imaging, and discussed case with Dr. Amelia Weems PA-C  Hospitalist  1/3/2023, 4:08 PM

## 2023-01-04 ENCOUNTER — TELEPHONE (OUTPATIENT)
Dept: FAMILY MEDICINE CLINIC | Age: 73
End: 2023-01-04

## 2023-01-04 VITALS
BODY MASS INDEX: 16.49 KG/M2 | SYSTOLIC BLOOD PRESSURE: 134 MMHG | DIASTOLIC BLOOD PRESSURE: 67 MMHG | RESPIRATION RATE: 16 BRPM | OXYGEN SATURATION: 100 % | TEMPERATURE: 98.3 F | HEIGHT: 66 IN | HEART RATE: 89 BPM | WEIGHT: 102.6 LBS

## 2023-01-04 LAB
ALBUMIN SERPL-MCNC: 3.3 GM/DL (ref 3.4–5)
ALP BLD-CCNC: 52 IU/L (ref 40–129)
ALT SERPL-CCNC: 9 U/L (ref 10–40)
ANION GAP SERPL CALCULATED.3IONS-SCNC: 12 MMOL/L (ref 4–16)
AST SERPL-CCNC: 10 IU/L (ref 15–37)
BASOPHILS ABSOLUTE: 0 K/CU MM
BASOPHILS RELATIVE PERCENT: 0.3 % (ref 0–1)
BILIRUB SERPL-MCNC: 0.3 MG/DL (ref 0–1)
BUN BLDV-MCNC: 14 MG/DL (ref 6–23)
CALCIUM SERPL-MCNC: 8.7 MG/DL (ref 8.3–10.6)
CHLORIDE BLD-SCNC: 93 MMOL/L (ref 99–110)
CHOLESTEROL: 156 MG/DL
CO2: 25 MMOL/L (ref 21–32)
CREAT SERPL-MCNC: 0.2 MG/DL (ref 0.6–1.1)
DIFFERENTIAL TYPE: ABNORMAL
EKG ATRIAL RATE: 90 BPM
EKG DIAGNOSIS: NORMAL
EKG P AXIS: 39 DEGREES
EKG P-R INTERVAL: 192 MS
EKG Q-T INTERVAL: 426 MS
EKG QRS DURATION: 116 MS
EKG QTC CALCULATION (BAZETT): 521 MS
EKG R AXIS: -63 DEGREES
EKG T AXIS: 228 DEGREES
EKG VENTRICULAR RATE: 90 BPM
EOSINOPHILS ABSOLUTE: 0 K/CU MM
EOSINOPHILS RELATIVE PERCENT: 0 % (ref 0–3)
GFR SERPL CREATININE-BSD FRML MDRD: >60 ML/MIN/1.73M2
GLUCOSE BLD-MCNC: 135 MG/DL (ref 70–99)
HCT VFR BLD CALC: 33 % (ref 37–47)
HDLC SERPL-MCNC: 19 MG/DL
HEMOGLOBIN: 10.9 GM/DL (ref 12.5–16)
IMMATURE NEUTROPHIL %: 0.7 % (ref 0–0.43)
LDL CHOLESTEROL CALCULATED: 123 MG/DL
LEGIONELLA URINARY AG: NEGATIVE
LV EF: 55 %
LVEF MODALITY: NORMAL
LYMPHOCYTES ABSOLUTE: 1.5 K/CU MM
LYMPHOCYTES RELATIVE PERCENT: 21.9 % (ref 24–44)
MCH RBC QN AUTO: 30.2 PG (ref 27–31)
MCHC RBC AUTO-ENTMCNC: 33 % (ref 32–36)
MCV RBC AUTO: 91.4 FL (ref 78–100)
MONOCYTES ABSOLUTE: 0.6 K/CU MM
MONOCYTES RELATIVE PERCENT: 8.4 % (ref 0–4)
PDW BLD-RTO: 12.5 % (ref 11.7–14.9)
PLATELET # BLD: 442 K/CU MM (ref 140–440)
PMV BLD AUTO: 9.3 FL (ref 7.5–11.1)
POTASSIUM SERPL-SCNC: 4.5 MMOL/L (ref 3.5–5.1)
PROCALCITONIN: 0.06
RBC # BLD: 3.61 M/CU MM (ref 4.2–5.4)
SEGMENTED NEUTROPHILS ABSOLUTE COUNT: 4.7 K/CU MM
SEGMENTED NEUTROPHILS RELATIVE PERCENT: 68.7 % (ref 36–66)
SODIUM BLD-SCNC: 130 MMOL/L (ref 135–145)
TOTAL IMMATURE NEUTOROPHIL: 0.05 K/CU MM
TOTAL PROTEIN: 5.9 GM/DL (ref 6.4–8.2)
TRIGL SERPL-MCNC: 69 MG/DL
TROPONIN T: <0.01 NG/ML
WBC # BLD: 6.8 K/CU MM (ref 4–10.5)

## 2023-01-04 PROCEDURE — 93010 ELECTROCARDIOGRAM REPORT: CPT | Performed by: INTERNAL MEDICINE

## 2023-01-04 PROCEDURE — 6370000000 HC RX 637 (ALT 250 FOR IP): Performed by: PHYSICIAN ASSISTANT

## 2023-01-04 PROCEDURE — 97530 THERAPEUTIC ACTIVITIES: CPT

## 2023-01-04 PROCEDURE — 84145 PROCALCITONIN (PCT): CPT

## 2023-01-04 PROCEDURE — 80061 LIPID PANEL: CPT

## 2023-01-04 PROCEDURE — 94761 N-INVAS EAR/PLS OXIMETRY MLT: CPT

## 2023-01-04 PROCEDURE — 97166 OT EVAL MOD COMPLEX 45 MIN: CPT

## 2023-01-04 PROCEDURE — 2580000003 HC RX 258: Performed by: PHYSICIAN ASSISTANT

## 2023-01-04 PROCEDURE — 84484 ASSAY OF TROPONIN QUANT: CPT

## 2023-01-04 PROCEDURE — 96376 TX/PRO/DX INJ SAME DRUG ADON: CPT

## 2023-01-04 PROCEDURE — 85025 COMPLETE CBC W/AUTO DIFF WBC: CPT

## 2023-01-04 PROCEDURE — 93306 TTE W/DOPPLER COMPLETE: CPT

## 2023-01-04 PROCEDURE — 94640 AIRWAY INHALATION TREATMENT: CPT

## 2023-01-04 PROCEDURE — 6360000002 HC RX W HCPCS: Performed by: PHYSICIAN ASSISTANT

## 2023-01-04 PROCEDURE — 97162 PT EVAL MOD COMPLEX 30 MIN: CPT

## 2023-01-04 PROCEDURE — 2700000000 HC OXYGEN THERAPY PER DAY

## 2023-01-04 PROCEDURE — 80053 COMPREHEN METABOLIC PANEL: CPT

## 2023-01-04 RX ORDER — LEVOFLOXACIN 750 MG/1
750 TABLET ORAL ONCE
Status: COMPLETED | OUTPATIENT
Start: 2023-01-04 | End: 2023-01-04

## 2023-01-04 RX ORDER — LEVOFLOXACIN 750 MG/1
750 TABLET ORAL DAILY
Qty: 4 TABLET | Refills: 0 | Status: SHIPPED | OUTPATIENT
Start: 2023-01-04 | End: 2023-01-08

## 2023-01-04 RX ORDER — PREDNISONE 20 MG/1
40 TABLET ORAL DAILY
Qty: 8 TABLET | Refills: 0 | Status: SHIPPED | OUTPATIENT
Start: 2023-01-04 | End: 2023-01-08

## 2023-01-04 RX ORDER — PREDNISONE 20 MG/1
40 TABLET ORAL DAILY
Qty: 20 TABLET | Refills: 0 | Status: CANCELLED | OUTPATIENT
Start: 2023-01-04 | End: 2023-01-08

## 2023-01-04 RX ADMIN — BUDESONIDE 250 MCG: 0.25 SUSPENSION RESPIRATORY (INHALATION) at 07:25

## 2023-01-04 RX ADMIN — CARVEDILOL 6.25 MG: 6.25 TABLET, FILM COATED ORAL at 09:13

## 2023-01-04 RX ADMIN — ISOSORBIDE MONONITRATE 30 MG: 30 TABLET, EXTENDED RELEASE ORAL at 09:09

## 2023-01-04 RX ADMIN — IPRATROPIUM BROMIDE AND ALBUTEROL SULFATE 1 AMPULE: .5; 3 SOLUTION RESPIRATORY (INHALATION) at 07:25

## 2023-01-04 RX ADMIN — LISINOPRIL 20 MG: 20 TABLET ORAL at 09:10

## 2023-01-04 RX ADMIN — SODIUM CHLORIDE, PRESERVATIVE FREE 10 ML: 5 INJECTION INTRAVENOUS at 09:10

## 2023-01-04 RX ADMIN — IPRATROPIUM BROMIDE AND ALBUTEROL SULFATE 1 AMPULE: .5; 3 SOLUTION RESPIRATORY (INHALATION) at 11:55

## 2023-01-04 RX ADMIN — METHYLPREDNISOLONE SODIUM SUCCINATE 40 MG: 40 INJECTION, POWDER, FOR SOLUTION INTRAMUSCULAR; INTRAVENOUS at 09:10

## 2023-01-04 RX ADMIN — BUSPIRONE HYDROCHLORIDE 5 MG: 5 TABLET ORAL at 09:10

## 2023-01-04 RX ADMIN — LEVOFLOXACIN 750 MG: 750 TABLET, FILM COATED ORAL at 15:00

## 2023-01-04 RX ADMIN — ASPIRIN 81 MG: 81 TABLET, COATED ORAL at 09:10

## 2023-01-04 NOTE — PROGRESS NOTES
Facility/Department: [facility] UNIT  Physical Therapy Initial Assessment    Name Harley PEREZ 1950   MRN [id number]   Date of Service 2023   Patient Room  017-01     Patient Diagnoses The primary encounter diagnosis was COPD exacerbation ([address] A diagnosis of EKG abnormality was also pertinent to this visit. Past Medical History  has a past medical history of Acute on chronic respiratory failure with hypoxia ([address] Age-related osteoporosis with current pathological fracture with routine healing, Chronic systolic heart failure (HCC), Compression fracture of thoracic vertebra with routine healing, COPD (chronic obstructive pulmonary disease) ([address] Coronary artery disease involving native coronary artery of native heart without angina pectoris, Hyperlipidemia, Mild pulmonary arterial systolic hypertension ([address] Mitral regurgitation, Pneumonia, Primary hypertension, and S/P CABG x 4. Past Surgical History  has a past surgical history that includes knee surgery; sinus surgery; Appendectomy; Hysterectomy; Tonsillectomy; Cardiac surgery; and Intracapsular cataract extraction (Left, 2019). Discharge Recommendations:  Home with Home health PT      Assessment:   Patient is a 67 y.o. female who presents to [facility] with COPD exacerbation. Patient on 3L O2 via NC with SpO2 93% during ambulation, dropping to 86% following and returning to 90s within 2 minutes of rest. Patient reports several falls recently and presents with decreased hip flexion bilaterally. Patient would benefit from continued skilled physical therapy services to address decreased strength, endurance, impaired balance, and decline in functional mobility.     Body Structures, Functions, Activity Limitations Requiring Skilled Therapeutic Intervention: Decreased strength;Decreased endurance;Decreased balance  Treatment Diagnosis: Generalized weakness  Therapy Prognosis: Good  Decision Making: Medium Complexity  Requires PT Follow-Up: Yes  Activity Tolerance  Activity Tolerance: Patient tolerated evaluation without incident;Patient tolerated treatment well     Plan:  Physcial Therapy Plan  General Plan: 3-5 times per week  Current Treatment Recommendations: Strengthening, Balance training, Functional mobility training, Transfer training, Gait training, Endurance training, Neuromuscular re-education, Therapeutic activities  Safety Devices  Type of Devices: Call light within reach, Gait belt, Left in bed     Restrictions  Restrictions/Precautions  Restrictions/Precautions: Fall Risk  Required Braces or Orthoses?: No     Subjective:  Pain: 0/10  General  Chart Reviewed: Yes  Patient assessed for rehabilitation services?: Yes  Family / Caregiver Present: Yes  Follows Commands: Within Functional Limits         Social/Functional History:  Social/Functional History  Lives With: Spouse  Type of Home: House  Home Access: Stairs to enter without rails, Stairs to enter with rails  Entrance Stairs - Number of Steps: 1+1 rails on first step  Bathroom Shower/Tub: Tub/Shower unit, Shower chair with back  Bathroom Toilet: Standard  Bathroom Equipment: Grab bars in shower, Hand-held shower  Home Equipment: Rosy Goldmann, rolling, Wheelchair-manual (w/c for outside the house)  Has the patient had two or more falls in the past year or any fall with injury in the past year?: Yes  Receives Help From: Family  ADL Assistance: Needs assistance ( helps with bathing, and putting socks on)  Grooming: Independent  Feeding: Independent  Toileting: Independent  Homemaking Assistance: Independent  Homemaking Responsibilities: Yes  Ambulation Assistance: Needs assistance (walker or cane)  Transfer Assistance: Independent  Active : Yes (can drive but  typically does)  Occupation: Retired  Type of Occupation: nursing aid  Leisure & Hobbies: reading, puzzles    Vision/Hearing:  Vision  Vision: Impaired  Vision Exceptions: Wears glasses for reading  Hearing  Hearing: Impaired - bilateral hearing aids      Cognition:  Orientation  Overall Orientation Status: Within Normal Limits  Orientation Level: Oriented X4  Cognition  Overall Cognitive Status: WNL     Objective:  Heart Rate: 89  Heart Rate Source: Monitor  BP: 134/67  BP Location: Right upper arm  BP Method: Automatic  MAP (Calculated): 89  Resp: 16  SpO2: 100 %  O2 Device: Nasal cannula     Observation/Palpation  Posture: Fair  Observation: Pt was supine in bed, HOB elevated, 3L O2 via NC  Gross Assessment  Strength: Generally decreased, functional  Tone: Normal      Strength RLE  Strength RLE: Exception  R Hip Flexion: 3-/5  R Hip ABduction: 4-/5  R Knee Flexion: 4-/5  R Knee Extension: 4/5  R Ankle Dorsiflexion: 4/5  R Ankle Plantar flexion: 4/5  Strength LLE  Strength LLE: Exception  L Hip Flexion: 2+/5  L Hip ABduction: 4-/5  L Knee Flexion: 4-/5  L Knee Extension: 4/5  L Ankle Dorsiflexion: 4/5  L Ankle Plantar Flexion: 4/5     Bed Mobility Training  Bed Mobility Training: Yes  Supine to Sit: Modified independent  Sit to Supine: Modified independent  Balance  Sitting: Intact  Standing: With support  Transfer Training  Transfer Training: Yes  Sit to Stand: Stand-by assistance  Stand to Sit: Stand-by assistance  Gait Training  Gait Training: Yes  Gait  Overall Level of Assistance: Contact-guard assistance  Speed/Sarah: Delayed  Step Length: Left shortened;Right shortened  Distance (ft): 100 Feet  Assistive Device: Walker, rolling    Balance  Posture: Fair  Sitting - Static: Good  Sitting - Dynamic: Good  Standing - Static: Fair  Standing - Dynamic: Fair           AM-PAC Score  Basic Mobility Six Clicks Form   How much difficulty does the patient currently have Unable   (pt is unable to do activity) A Lot   (activity is a struggle, requires great effort/time) A Little   (pt can manage, but takes more effort/time than should) None   (pt has no difficulty)   Turning over in bed   []1 []2  []3 [x]4    STS from a chair with arms  []1 []2 []3   [x]4     Supine to/from sitting EOB []1 []2  []3   [x]4     Assistance required Total   (Total/Dependent Assist) A Lot   (Max/Mod Assist) A Little   (Min/CGA/Supervision) None   (No human assistance)   Moving to and from a bed to a chair  []1  []2   [x]3  []4     To walk in a hospital room? []1 []2   [x]3    []4    Climbing 3-5 steps with a railing? []1  []2   [x]3    []4       Raw Score 21   Standardized Score 50.25   CMS 0-100% Score 28.97%   CMS Modifier CJ   CJ = 20-40% impaired      Goals  Short Term Goals  Time Frame for Short Term Goals: 1 week  Patient Goals   Patient Goals : Go home   Short Term Goal 1: Patient will complete all bed mobility with Indep. Short Term Goal 2: Patient will complete STS transfers from EOB, commode, and bedside chair with Mod I. Short Term Goal 3: Patient will ambulate 150ft with LRAD given SUP. Short Term Goal 4: Patient will perform dynamic standing balance activities for 5 minutes without LOB. Education  Patient Education  Education Given To: Patient; Family  Education Provided: Role of Therapy;Plan of Care  Education Method: Verbal  Barriers to Learning: None  Education Outcome: Verbalized understanding    Therapy Time   Individual Co-treatment   Time In  1125   Time Out  1152   Minutes  27       Signed:  Aditya Jordan PT, FRANCISCA [id number]   1/4/2023, 2:40 PM

## 2023-01-04 NOTE — CARE COORDINATION
CM received a call from the patient's nurse stating that the patient will be discharged today and has requested [phone] services. 2:08 PM  CM spoke with the patient who stated that she would like [phone] and does not have a preference regarding the agency selected as long as it is in-network with her insurance. CM will make the referral to [facility]. CM will follow. 2:21 PM  CM faxed the [facility] order and supporting clinical documentation to [facility] to initiate the referral.  CM will follow. 2:27 PM  CM faxed the discharge summary to [facility] and spoke with Rina Gipson to notify of the patient's discharge. 3:15 PM  CM faxed the patient's discharge instructions to [facility].

## 2023-01-04 NOTE — PROGRESS NOTES
Occupational Therapy  Facility/Department: [facility] UNIT  Occupational Therapy Initial Assessment    Name: Flaca Santo  : 1950  MRN: [id number]  Date of Service: 2023    Discharge Recommendations:  Home with nursing aide, Home with assist PRN, 24 hour supervision or assist, Home with Home health OT (v short stay on Swing to improve endurance and independence with adls and transfers)          Patient Diagnosis(es): The primary encounter diagnosis was COPD exacerbation ([address] A diagnosis of EKG abnormality was also pertinent to this visit. Past Medical History:  has a past medical history of Acute on chronic respiratory failure with hypoxia ([address] Age-related osteoporosis with current pathological fracture with routine healing, Chronic systolic heart failure (HCC), Compression fracture of thoracic vertebra with routine healing, COPD (chronic obstructive pulmonary disease) ([address] Coronary artery disease involving native coronary artery of native heart without angina pectoris, Hyperlipidemia, Mild pulmonary arterial systolic hypertension ([address] Mitral regurgitation, Pneumonia, Primary hypertension, and S/P CABG x 4. Past Surgical History:  has a past surgical history that includes knee surgery; sinus surgery; Appendectomy; Hysterectomy; Tonsillectomy; Cardiac surgery; and Intracapsular cataract extraction (Left, 2019). Assessment   Performance deficits / Impairments: Decreased functional mobility ; Decreased ADL status; Decreased strength;Decreased safe awareness;Decreased endurance  Assessment: 66 yo female admitted to the hospital with COPD exacerbation. She presents with general weakness, SOB and decreased endurance for transfers and adls.   OT to see the pt to maximize I with adls, transfers, and improve endurance and safety  Prognosis: Good  Decision Making: Medium Complexity  History: see above  Exam: see above  Activity Tolerance  Activity Tolerance: Patient Tolerated treatment well        Plan   Occupational Therapy Plan  Times Per Week: 3+  Current Treatment Recommendations: Strengthening, ROM, Functional mobility training, Endurance training, Gait training, Safety education & training     Restrictions  Restrictions/Precautions  Restrictions/Precautions: Fall Risk  Required Braces or Orthoses?: No    Subjective   General  Patient assessed for rehabilitation services?: Yes     Social/Functional History  Social/Functional History  Lives With: Spouse  Type of Home: House  Home Access: Stairs to enter without rails, Stairs to enter with rails  Entrance Stairs - Number of Steps: 1+1 rails on first step  Bathroom Shower/Tub: Tub/Shower unit, Shower chair with back  Bathroom Toilet: Standard  Bathroom Equipment: Grab bars in shower, Hand-held shower  Home Equipment: Sydnie Goss, melissa, Tigist (w/c for outside the house)  Has the patient had two or more falls in the past year or any fall with injury in the past year?: Yes  Receives Help From: Family  ADL Assistance: Needs assistance ( helps with bathing, and putting socks on)  Grooming: Independent  Feeding: Independent  Toileting: Independent  Homemaking Assistance: Independent  Homemaking Responsibilities: Yes  Ambulation Assistance: Needs assistance (walker or cane)  Transfer Assistance: Independent  Active : Yes (can drive but hisband typically does)  Occupation: Retired  Type of Occupation: nursing aid  Leisure & Hobbies: reading, puzzles       Objective   Heart Rate: 89  Heart Rate Source: Monitor  BP: 134/67  BP Location: Right upper arm  BP Method: Automatic  MAP (Calculated): 89  Resp: 16  SpO2: 100 %  O2 Device: Nasal cannula          Observation/Palpation  Observation: Pt was supine in bed , HOB elevated  Safety Devices  Type of Devices: Call light within reach;Gait belt;Left in bed        AROM: Within functional limits  Strength:  Within functional limits (L shoulder may have been slightly weaker)  ADL  Feeding: Setup  UE Bathing: Supervision (simulated)  LE Bathing:  (able to reach below knees)  LE Dressing:  (D for socks;)  Tone RUE  RUE Tone: Normotonic  Tone LUE  LUE Tone: Normotonic     Bed mobility  Supine to Sit: Modified independent  Sit to Supine: Modified independent  Transfers  Sit to stand: Contact guard assistance;Stand by assistance  Stand to sit: Stand by assistance  Vision  Vision: Impaired  Vision Exceptions: Wears glasses for reading  Hearing  Hearing: Within functional limits  Cognition  Overall Cognitive Status: WNL  Orientation  Overall Orientation Status: Within Normal Limits  Orientation Level: Oriented X4        Sensation  Overall Sensation Status: WFL            LUE AROM (degrees)  LUE AROM : WFL  RUE AROM (degrees)  RUE AROM : WFL  LUE Strength  Gross LUE Strength: WFL  LUE Strength Comment: left shoulder slightly decreased 4-/5 4/5  RUE Strength  R Hand General: 4/5                  G-Code     OutComes Score                                                  AM-PAC Score   AM-PAC 6 click short form for inpatient daily activity:   How much help from another person does the patient currently need. .. Unable  Dep A Lot  Max A A Lot   Mod A A Little  Min A A Little   CGA  SBA None   Mod I  Indep  Sup   1. Putting on and taking off regular lower body clothing? [] 1    [] 2   [] 2   [] 3   [x] 3   [] 4      2. Bathing (including washing, rinsing, drying)? [] 1   [] 2   [] 2 [] 3 [x] 3 [] 4   3. Toileting, which includes using toilet, bedpan, or urinal? [] 1    [] 2   [] 2   [] 3   [x] 3   [] 4     4. Putting on and taking off regular upper body clothing? [] 1   [] 2   [] 2   [] 3   [x] 3    [] 4      5. Taking care of personal grooming such as brushing teeth? [] 1   [] 2    [] 2 [] 3    [x] 3   [] 4      6. Eating meals?    [] 1   [] 2   [] 2   [] 3   [] 3   [x] 4      Raw Score:  19/24  40-59% impaired     [24=0% impaired(CH), 23=1-19%(CI), 20-22=20-39%(CJ), 15-19=40-59%(CK), 10-14=60-79%(CL), 7-9=80-99%(CM), 6=100%(CN)]           Tinneti Score       Goals  Short Term Goals  Time Frame for Short Term Goals: until discharge  Short Term Goal 1: Pt will be MI for functional transfers to chair, toilet, and bed w/o LOB or falls keeping O2 sats >88%  Short Term Goal 2: Pt will be I/MI for UB bathing/dressing using good energy conservation techniques and breathing techniques, pacing self  Short Term Goal 3: Pt will be CGA/Min A( has been helping at home) with LB bathing/dressing using necessary a.e. and good energy conservation and dressing techniques  Short Term Goal 4: Pt will be min vc for light exercise to improve general strength and endurance for transfers, adls, and activities  Patient Goals   Patient goals : to go home       Therapy Time   Individual Concurrent Group Co-treatment   Time In       1126   Time Out       1152   Minutes       [address], OT

## 2023-01-04 NOTE — PROGRESS NOTES
Discharge instructions reviewed with the patient at the bedside and patient understands that she will have prednisone and levaquin to  from the pharmacy. Patient understands she already had her dose of levaquin today and will start the daily dose tomorrow. IV removed and clean dry dressing placed. Patient assisted with getting dressed and is awaiting ride home.

## 2023-01-04 NOTE — TELEPHONE ENCOUNTER
[facility] called-patient was discharged from [facility] care was ordered-wanted to see if selene would cover-instructions given that selene would cover home orders

## 2023-01-04 NOTE — DISCHARGE SUMMARY
V2.0  Discharge Summary    Name:  Rosalio Eden /Age/Sex: 1950 (67 y.o. female)   Admit Date: 1/3/2023  Discharge Date: 23    MRN & CSN:  [id number] & [id number] Encounter Date and Time 23 2:11 PM EST    Attending:  Laureen Gusman MD Discharging Provider: Andrea Zapata [facility] Course:     Brief HPI: Rosalio Eden is a 67 y.o. female who presented with dyspnea, cough. Problems addressed during this hospitalization:     COPD Exacerbation and bilateral pneumonia  -No signs of sepsis. On her baseline 2 L of oxygen. Endorses productive cough. Negative COVID and flu. Chest x-ray with bibasilar opacities. - on day of discharge, patient still on baseline O2, feeling much better and improved clinically   - will continue Levaquin to complete 5 day course, continue steroid burst x5 days, home inhalers  - f/u with outpatient PCP and pulmonologist      Chest pain - likely secondary to above   -In setting of CAD, CABG x4. CXR with pneumonia but no acute cardiac findings. EKG with T wave inversions in leads 5 and 6 that are new. Patient denies chest pain except only when coughing  -Had previous cardiac work-up in  with echo that showed EF of 35 to 40% and normal stress test.  Last heart cath in 2017 showed severe CAD.   - troponin negative x3   - echo with normal EF, mild LVH, mild to moderate MR, mild TR  -Cardiology evaluated, felt likely secondary to COPD exacerbation, recommended outpatient cardiology follow-up with Dr. Eli Blevins, patient expressed understanding    Falls  - patient stated she feel 3 weeks ago, denied acute injury  - ambulating at baseline on day of discharge  - [facility]    Chronic hypoxic respiratory failure secondary to COPD  -Baseline of 2 L off oxygen     Chronic systolic heart failure  - echo with EF 55%, improved from last echo 2020 (35-40%)  - continue home BB, ACEi  - f/u with cardiology as outpatient      CAD status post CABG  -Continue home aspirin, statin, Imdur     Essential hypertension  - BP stable  -Continue Coreg and lisinopril    Chronic hyponatremia  -Sodium near baseline  -Monitor BMP as outpatient     Pulmonary artery hypertension    This patient was seen and examined/discussed in conjunction with Dr. Neno Monk. He/ was agreeable with patient's plan at discharge as dictated above. The patient expressed appropriate understanding of, and agreement with the discharge recommendations, medications, and plan.      Consults this admission:  IP CONSULT TO CARDIOLOGY  IP CONSULT TO HOME CARE NEEDS    Discharge Diagnosis:   COPD exacerbation [facility])    Discharge Instruction:   Follow up appointments: PCP, cardiology, pulmnology   Primary care physician: Mike Banks PA-C within 2 weeks  Diet: cardiac diet   Activity: activity as tolerated  Disposition: Discharged to:   []Home, [x][facility], []SNF, []Acute Rehab, []Hospice   Condition on discharge: Stable  Labs and Tests to be Followed up as an outpatient by PCP or Specialist:     Discharge Medications:        Medication List        START taking these medications      levoFLOXacin 750 MG tablet  Commonly known as: Levaquin  Take 1 tablet by mouth daily for 4 days            CONTINUE taking these medications      albuterol sulfate  (90 Base) MCG/ACT inhaler  Commonly known as: PROVENTIL;VENTOLIN;PROAIR  Inhale 2 puffs into the lungs every 6 hours as needed for Wheezing     Arformoterol Tartrate 15 MCG/2ML Nebu  Commonly known as: BROVANA  Take 1 ampule by nebulization 2 times daily     aspirin 81 MG chewable tablet  Take 1 tablet by mouth daily     busPIRone 5 MG tablet  Commonly known as: BUSPAR  Take 1 tablet by mouth twice daily     carvedilol 12.5 MG tablet  Commonly known as: COREG  TAKE 1/2 (ONE-HALF) TABLET BY MOUTH TWICE DAILY WITH MEALS     docusate sodium 100 MG capsule  Commonly known as: COLACE     doxepin 10 MG capsule  Commonly known as: SINEQUAN  TAKE 1 CAPSULE BY MOUTH NIGHTLY     ibandronate 150 MG tablet  Commonly known as: Boniva  Take 1 tablet by mouth every 30 days Take one (1) tablet once per month in the morning with a full glass of water, on an empty stomach, and do not take anything else by mouth or lie down for the next 30 minutes. isosorbide mononitrate 60 MG extended release tablet  Commonly known as: IMDUR  Take 1/2 (one-half) tablet by mouth once daily     lisinopril 20 MG tablet  Commonly known as: PRINIVIL;ZESTRIL     montelukast 10 MG tablet  Commonly known as: SINGULAIR  Take 1 tablet by mouth once daily     MUCINEX DM PO     naproxen 375 MG tablet  Commonly known as: NAPROSYN  Take 1 tablet by mouth 2 times daily as needed for Pain     nitroGLYCERIN 0.4 MG SL tablet  Commonly known as: Nitrostat  Place 1 tablet under the tongue every 5 minutes as needed for Chest pain     * predniSONE 5 MG tablet  Commonly known as: DELTASONE     * predniSONE 20 MG tablet  Commonly known as: DELTASONE  Take 2 tablets by mouth daily for 4 days           * This list has 2 medication(s) that are the same as other medications prescribed for you. Read the directions carefully, and ask your doctor or other care provider to review them with you.                 ASK your doctor about these medications      atorvastatin 10 MG tablet  Commonly known as: LIPITOR     azithromycin 250 MG tablet  Commonly known as: ZITHROMAX  Take 1 tablet by mouth See Admin Instructions for 5 days 500mg on day 1 followed by 250mg on days 2 - 5     fluticasone-umeclidin-vilant 100-62.5-25 MCG/ACT Aepb inhaler  Commonly known as: TRELEGY ELLIPTA  Inhale 1 puff into the lungs daily               Where to Get Your Medications        These medications were sent to [address] [place] [address] 222 [id number], [address] [place] [address]      Phone: [phone]   levoFLOXacin 750 MG tablet  predniSONE 20 MG tablet        Objective Findings at Discharge:   /67   Pulse 89   Temp 98.3 °F (36.8 °C) (Oral)   Resp 16   Ht 5' 6\" (1.676 m)   Wt 102 lb 9.6 oz (46.5 kg)   LMP  (LMP Unknown)   SpO2 100%   BMI 16.56 kg/m²       Physical Exam:   General: NAD, appears deconditioned  Eyes: EOMI  ENT: neck supple  Cardiovascular: Regular rate and rhythm   Respiratory: Occasional expiratory wheeze, respirations even and unlabored on 2L NC  Gastrointestinal: Abdomen soft, non tender  Genitourinary: no suprapubic tenderness  Musculoskeletal: No edema  Skin: warm, dry  Neuro: Alert. Psych: Mood appropriate. Labs and Imaging   XR CHEST PORTABLE    Result Date: 1/3/2023  EXAMINATION: ONE XRAY VIEW OF THE CHEST 1/3/2023 1:59 pm COMPARISON: 12/06/2022 HISTORY: ORDERING SYSTEM PROVIDED HISTORY: sob TECHNOLOGIST PROVIDED HISTORY: Reason for exam:->sob Reason for Exam: sob Additional signs and symptoms: sob FINDINGS: Status post median sternotomy. Heart size stable. Bibasilar linear pulmonary opacities. No pneumothorax. No pulmonary vascular congestion or edema.      Bibasilar linear pulmonary opacity suggesting pneumonia       CBC:   Recent Labs     01/03/23  1415 01/04/23  0500   WBC 10.8* 6.8   HGB 12.6 10.9*   * 442*     BMP:    Recent Labs     01/03/23  1415 01/04/23  0500   * 130*   K 5.0 4.5   CL 91* 93*   CO2 26 25   BUN 19 14   CREATININE 0.2* 0.2*   GLUCOSE 110* 135*     Hepatic:   Recent Labs     01/04/23  0500   AST 10*   ALT 9*   BILITOT 0.3   ALKPHOS 52     Lipids:   Lab Results   Component Value Date/Time    CHOL 156 01/04/2023 05:00 AM    CHOL 147 06/04/2021 03:51 PM    HDL 19 01/04/2023 05:00 AM    TRIG 69 01/04/2023 05:00 AM     Hemoglobin A1C:   Lab Results   Component Value Date/Time    LABA1C 5.0 10/05/2021 01:54 PM     TSH: No results found for: TSH  Troponin:   Lab Results   Component Value Date/Time    TROPONINT <0.010 01/04/2023 05:00 AM    TROPONINT <0.010 01/03/2023 11:00 PM    TROPONINT <0.010 01/03/2023 06:30 PM Lactic Acid: No results for input(s): LACTA in the last 72 hours. BNP: No results for input(s): PROBNP in the last 72 hours. UA:  Lab Results   Component Value Date/Time    NITRU NEGATIVE 07/19/2021 01:19 PM    COLORU yellow 03/24/2022 12:33 PM    COLORU YELLOW 07/19/2021 01:19 PM    PHUR 7.0 03/24/2022 12:33 PM    WBCUA NEGATIVE 07/19/2021 01:19 PM    RBCUA 1 07/19/2021 01:19 PM    MUCUS NEGATIVE 12/29/2016 09:00 PM    BACTERIA NEGATIVE 07/19/2021 01:19 PM    CLARITYU clear 03/24/2022 12:33 PM    CLARITYU CLEAR 07/19/2021 01:19 PM    SPECGRAV 1.020 03/24/2022 12:33 PM    SPECGRAV 1.010 07/19/2021 01:19 PM    LEUKOCYTESUR small 03/24/2022 12:33 PM    LEUKOCYTESUR NEGATIVE 07/19/2021 01:19 PM    UROBILINOGEN 0.2 07/19/2021 01:19 PM    BILIRUBINUR neg 03/24/2022 12:33 PM    BLOODU neg 03/24/2022 12:33 PM    BLOODU NEGATIVE 07/19/2021 01:19 PM    GLUCOSEU neg 03/24/2022 12:33 PM    KETUA neg 03/24/2022 12:33 PM    KETUA NEGATIVE 07/19/2021 01:19 PM     Urine Cultures:   Lab Results   Component Value Date/Time    LABURIN  03/24/2022 12:35 PM     75,000 CFU/ml  Susceptibility testing of penicillin and other beta lactams is  not necessary for beta hemolytic Streptococci since resistant  strains have not been identified.  (CLSI M100)       Blood Cultures: No results found for: BC  No results found for: BLOODCULT2  Organism:   Lab Results   Component Value Date/Time    ORG Strep agalactiae (Beta Strep Group B) 03/24/2022 12:35 PM       Time Spent Discharging patient 35 minutes    Electronically signed by Cathy Isabel PA-C on 1/4/2023 at 2:11 PM

## 2023-01-04 NOTE — PLAN OF CARE
Problem: Discharge Planning  Goal: Discharge to home or other facility with appropriate resources  Outcome: Completed     Problem: Safety - Adult  Goal: Free from fall injury  Outcome: Completed     Problem: Respiratory - Adult  Goal: Achieves optimal ventilation and oxygenation  Outcome: Completed     Problem: Metabolic/Fluid and Electrolytes - Adult  Goal: Electrolytes maintained within normal limits  Outcome: Completed  Goal: Hemodynamic stability and optimal renal function maintained  Outcome: Completed  Goal: Glucose maintained within prescribed range  Outcome: Completed     Problem: Pain  Goal: Verbalizes/displays adequate comfort level or baseline comfort level  Outcome: Completed

## 2023-01-04 NOTE — PLAN OF CARE
Problem: Discharge Planning  Goal: Discharge to home or other facility with appropriate resources  Outcome: Progressing     Problem: Safety - Adult  Goal: Free from fall injury  Outcome: Progressing     Problem: Respiratory - Adult  Goal: Achieves optimal ventilation and oxygenation  Outcome: Progressing     Problem: Metabolic/Fluid and Electrolytes - Adult  Goal: Electrolytes maintained within normal limits  Outcome: Progressing  Goal: Hemodynamic stability and optimal renal function maintained  Outcome: Progressing  Goal: Glucose maintained within prescribed range  Outcome: Progressing     Problem: Pain  Goal: Verbalizes/displays adequate comfort level or baseline comfort level  Outcome: Progressing

## 2023-01-04 NOTE — PROGRESS NOTES
4 Eyes Skin Assessment     NAME:  Elissa Mckeon  YOB: 1950  MEDICAL RECORD NUMBER:  [id number]    The patient is being assessed for  Admission    I agree that One RN have performed a thorough Head to Toe Skin Assessment on the patient. ALL assessment sites listed below have been assessed. Areas assessed by both nurses:    Head, Face, Ears, Shoulders, Back, Chest, Arms, Elbows, Hands, Sacrum. Buttock, Coccyx, Ischium, and Legs. Feet and Heels        Does the Patient have a Wound?  No noted wound(s)       Kang Prevention initiated by RN: NA   Wound Care Orders initiated by RN: NA    Pressure Injury (Stage 3,4, Unstageable, DTI, NWPT, and Complex wounds) if present place referral order by RN under : No    New and Established Ostomies, if present place, referral order under : NA      Nurse 1 eSignature: Electronically signed by Americo Moody RN on 1/3/23 at 10:22 PM EST    **SHARE this note so that the co-signing nurse is able to place an eSignature**    Nurse 2 eSignature: Electronically signed by Fiorella Abdi RN on 1/4/23 at 12:50 AM EST

## 2023-01-08 LAB
CULTURE: NORMAL
CULTURE: NORMAL
Lab: NORMAL
Lab: NORMAL
SPECIMEN: NORMAL
SPECIMEN: NORMAL

## 2023-01-11 LAB — STREP PNEUMONIAE ANTIGEN: NORMAL

## 2023-01-16 RX ORDER — BUSPIRONE HYDROCHLORIDE 5 MG/1
TABLET ORAL
Qty: 60 TABLET | Refills: 5 | Status: SHIPPED | OUTPATIENT
Start: 2023-01-16

## 2023-01-18 ENCOUNTER — OFFICE VISIT (OUTPATIENT)
Dept: FAMILY MEDICINE CLINIC | Age: 73
End: 2023-01-18

## 2023-01-18 VITALS
OXYGEN SATURATION: 98 % | SYSTOLIC BLOOD PRESSURE: 132 MMHG | WEIGHT: 98.8 LBS | DIASTOLIC BLOOD PRESSURE: 88 MMHG | BODY MASS INDEX: 15.95 KG/M2 | RESPIRATION RATE: 20 BRPM | TEMPERATURE: 97.3 F | HEART RATE: 51 BPM

## 2023-01-18 DIAGNOSIS — I50.22 CHRONIC SYSTOLIC HEART FAILURE (HCC): ICD-10-CM

## 2023-01-18 DIAGNOSIS — E87.1 HYPONATREMIA: Primary | ICD-10-CM

## 2023-01-18 DIAGNOSIS — Z09 HOSPITAL DISCHARGE FOLLOW-UP: ICD-10-CM

## 2023-01-18 DIAGNOSIS — J44.1 COPD EXACERBATION (HCC): ICD-10-CM

## 2023-01-18 DIAGNOSIS — E87.1 HYPONATREMIA: ICD-10-CM

## 2023-01-18 RX ORDER — CARVEDILOL 12.5 MG/1
TABLET ORAL
Qty: 30 TABLET | Refills: 5 | Status: SHIPPED | OUTPATIENT
Start: 2023-01-18

## 2023-01-18 RX ORDER — FLUTICASONE FUROATE, UMECLIDINIUM BROMIDE AND VILANTEROL TRIFENATATE 100; 62.5; 25 UG/1; UG/1; UG/1
POWDER RESPIRATORY (INHALATION)
COMMUNITY
Start: 2023-01-03

## 2023-01-18 ASSESSMENT — PATIENT HEALTH QUESTIONNAIRE - PHQ9
2. FEELING DOWN, DEPRESSED OR HOPELESS: 1
SUM OF ALL RESPONSES TO PHQ9 QUESTIONS 1 & 2: 2
SUM OF ALL RESPONSES TO PHQ QUESTIONS 1-9: 2
1. LITTLE INTEREST OR PLEASURE IN DOING THINGS: 1

## 2023-01-19 LAB
ANION GAP SERPL CALCULATED.3IONS-SCNC: 12 MMOL/L (ref 3–16)
BUN BLDV-MCNC: 14 MG/DL (ref 7–20)
CALCIUM SERPL-MCNC: 8.7 MG/DL (ref 8.3–10.6)
CHLORIDE BLD-SCNC: 92 MMOL/L (ref 99–110)
CO2: 27 MMOL/L (ref 21–32)
CREAT SERPL-MCNC: <0.5 MG/DL (ref 0.6–1.2)
GFR SERPL CREATININE-BSD FRML MDRD: >60 ML/MIN/{1.73_M2}
GLUCOSE BLD-MCNC: 99 MG/DL (ref 70–99)
POTASSIUM SERPL-SCNC: 5 MMOL/L (ref 3.5–5.1)
SODIUM BLD-SCNC: 131 MMOL/L (ref 136–145)

## 2023-01-20 DIAGNOSIS — Z87.891 PERSONAL HISTORY OF TOBACCO USE: Primary | ICD-10-CM

## 2023-01-20 NOTE — PROGRESS NOTES
Post-Discharge Transitional Care  Follow Up      Silvana Dobbs   YOB: 1950    Date of Office Visit:  1/18/2023  Date of Hospital Admission: 1/3/23  Date of Hospital Discharge: 1/4/23  Risk of hospital readmission (high >=14%. Medium >=10%) :Readmission Risk Score: 15.6      Care management risk score Rising risk (score 2-5) and Complex Care (Scores >=6): No Risk Score On File     Non face to face  following discharge, date last encounter closed (first attempt may have been earlier): *No documented post hospital discharge outreach found in the last 14 days    Call initiated 2 business days of discharge: *No response recorded in the last 14 days    ASSESSMENT/PLAN:   Hyponatremia  -     Basic Metabolic Panel; Future  Hospital discharge follow-up  -     MA DISCHARGE MEDS RECONCILED W/ CURRENT OUTPATIENT MED LIST  COPD exacerbation ([facility] [address]  Assessment & Plan:  Continue current meds, pt strongly encouraged to f/u with pulmonology at this time, pt states she will make appt   Chronic systolic heart failure [facility])  Assessment & Plan:  Pt encouraged to f/u out pt with cardiology, states she will think about calling for referral , advised of risks of diagnosis/noncompliance/side effects,  voices understanding        Medical Decision Making: moderate complexity  Return in 1 month (on 2/18/2023). Subjective:   HPI:  Follow up of Hospital problems/diagnosis(es): copd exacerbation    Inpatient course: Discharge summary reviewed- see chart. Interval history/Current status: pt has finished prednisone burst, back to low dose daily. Finished levaquin given at d/c. Feels she is back to baseline status prior to hospitalization. Will need BMP today for recheck chronic hyponatremia. Pt has not followed up with pulmonologist at this time-she is highly encouraged to see specialist. Pt is reluctant but does agree to call provider listed on discharge papers.  She has hx chronic systolic heart failure-has also not made arrangements to f/u with cardiology. Pt has refused referrals to pulm/cardiology in past-states she becomes very nervous going to new doctors but is reluctantly agreeable to f/u with specialists today. Patient Active Problem List   Diagnosis    COPD exacerbation ([facility] [address]    Acute on chronic respiratory failure with hypoxia ([facility] [address]    Pneumonia of right upper lobe due to infectious organism    Chronic systolic heart failure (HCC)    Severe malnutrition (HCC)    Hyponatremia    Bandemia    Abnormal finding on EKG    S/P CABG x 4    Primary hypertension    Chest wall pain       Medications listed as ordered at the time of discharge from hospital     Medication List            Accurate as of January 18, 2023 11:59 PM. If you have any questions, ask your nurse or doctor. CONTINUE taking these medications      albuterol sulfate  (90 Base) MCG/ACT inhaler  Commonly known as: PROVENTIL;VENTOLIN;PROAIR  Inhale 2 puffs into the lungs every 6 hours as needed for Wheezing     arformoterol tartrate 15 MCG/2ML Nebu  Commonly known as: BROVANA  Take 1 ampule by nebulization 2 times daily     aspirin 81 MG chewable tablet  Take 1 tablet by mouth daily     atorvastatin 10 MG tablet  Commonly known as: LIPITOR     busPIRone 5 MG tablet  Commonly known as: BUSPAR  Take 1 tablet by mouth twice daily     carvedilol 12.5 MG tablet  Commonly known as: COREG  TAKE 1/2 (ONE-HALF) TABLET BY MOUTH TWICE DAILY WITH MEALS     docusate sodium 100 MG capsule  Commonly known as: COLACE     doxepin 10 MG capsule  Commonly known as: SINEQUAN  TAKE 1 CAPSULE BY MOUTH NIGHTLY     ibandronate 150 MG tablet  Commonly known as: Boniva  Take 1 tablet by mouth every 30 days Take one (1) tablet once per month in the morning with a full glass of water, on an empty stomach, and do not take anything else by mouth or lie down for the next 30 minutes.      isosorbide mononitrate 60 MG extended release tablet  Commonly known as: IMDUR  Take 1/2 (one-half) tablet by mouth once daily     lisinopril 20 MG tablet  Commonly known as: PRINIVIL;ZESTRIL     montelukast 10 MG tablet  Commonly known as: SINGULAIR  Take 1 tablet by mouth once daily     MUCINEX DM PO     naproxen 375 MG tablet  Commonly known as: NAPROSYN  Take 1 tablet by mouth 2 times daily as needed for Pain     nitroGLYCERIN 0.4 MG SL tablet  Commonly known as: Nitrostat  Place 1 tablet under the tongue every 5 minutes as needed for Chest pain     predniSONE 5 MG tablet  Commonly known as: Cindy Macadam Ellipta 100-62.5-25 MCG/ACT Aepb inhaler  Generic drug: fluticasone-umeclidin-vilant               Where to Get Your Medications        These medications were sent to [address] [place] [address], [address] [facility] [address] [place], [place] [address], [address] [facility] [address]      Phone: [phone]   carvedilol 12.5 MG tablet           Medications marked \"taking\" at this time  Outpatient Medications Marked as Taking for the 1/18/23 encounter (Office Visit) with Aldair Mendez PA-C   Medication Sig Dispense Refill    TRELEGY ELLIPTA 100-62.5-25 MCG/ACT AEPB inhaler INHALE 1 PUFF ONCE DAILY      carvedilol (COREG) 12.5 MG tablet TAKE 1/2 (ONE-HALF) TABLET BY MOUTH TWICE DAILY WITH MEALS 30 tablet 5    busPIRone (BUSPAR) 5 MG tablet Take 1 tablet by mouth twice daily 60 tablet 5    naproxen (NAPROSYN) 375 MG tablet Take 1 tablet by mouth 2 times daily as needed for Pain 20 tablet 0    isosorbide mononitrate (IMDUR) 60 MG extended release tablet Take 1/2 (one-half) tablet by mouth once daily 30 tablet 3    lisinopril (PRINIVIL;ZESTRIL) 20 MG tablet Take 20 mg by mouth daily      predniSONE (DELTASONE) 5 MG tablet Take 5 mg by mouth daily      Dextromethorphan-guaiFENesin (MUCINEX DM PO) Take by mouth      ibandronate (BONIVA) 150 MG tablet Take 1 tablet by mouth every 30 days Take one (1) tablet once per month in the morning with a full glass of water, on an empty stomach, and do not take anything else by mouth or lie down for the next 30 minutes. 1 tablet 11    doxepin (SINEQUAN) 10 MG capsule TAKE 1 CAPSULE BY MOUTH NIGHTLY 30 capsule 11    albuterol sulfate HFA (PROVENTIL;VENTOLIN;PROAIR) 108 (90 Base) MCG/ACT inhaler Inhale 2 puffs into the lungs every 6 hours as needed for Wheezing 18 g 3    montelukast (SINGULAIR) 10 MG tablet Take 1 tablet by mouth once daily 30 tablet 11    Arformoterol Tartrate (BROVANA) 15 MCG/2ML NEBU Take 1 ampule by nebulization 2 times daily 120 mL 5    nitroGLYCERIN (NITROSTAT) 0.4 MG SL tablet Place 1 tablet under the tongue every 5 minutes as needed for Chest pain 25 tablet 3    docusate sodium (COLACE) 100 MG capsule Take 100 mg by mouth daily as needed       aspirin 81 MG chewable tablet Take 1 tablet by mouth daily 30 tablet 3        Medications patient taking as of now reconciled against medications ordered at time of hospital discharge: Yes        Objective:    /88 (Site: Left Upper Arm, Position: Sitting, Cuff Size: Small Adult)   Pulse 51   Temp 97.3 °F (36.3 °C)   Resp 20   Wt 98 lb 12.8 oz (44.8 kg)   LMP  (LMP Unknown)   SpO2 98% Comment: on 3L  BMI 15.95 kg/m²   General Appearance: alert, with anxious affect, frail-appearing, and cachectic  Pulmonary/Chest: decreased breath sounds noted- carly lower lobes. Mild wheezing noted but clears with cough. Pt wearing oxygen 2L NC  Cardiovascular: normal rate, normal S1 and S2, and no gallops  Extremities: no cyanosis, no clubbing, and no edema  Musculoskeletal: generalized weakness, in wheelchair      An electronic signature was used to authenticate this note.   --Adriano Bettencourt PA-C

## 2023-01-20 NOTE — ASSESSMENT & PLAN NOTE
Pt encouraged to f/u out pt with cardiology, states she will think about calling for referral , advised of risks of diagnosis/noncompliance/side effects,  voices understanding

## 2023-01-20 NOTE — ASSESSMENT & PLAN NOTE
Continue current meds, pt strongly encouraged to f/u with pulmonology at this time, pt states she will make appt

## 2023-01-26 ENCOUNTER — TELEPHONE (OUTPATIENT)
Dept: FAMILY MEDICINE CLINIC | Age: 73
End: 2023-01-26

## 2023-01-26 RX ORDER — PREDNISONE 1 MG/1
5 TABLET ORAL DAILY
Qty: 30 TABLET | Refills: 11 | Status: SHIPPED | OUTPATIENT
Start: 2023-01-26

## 2023-01-26 NOTE — TELEPHONE ENCOUNTER
Called and spoke w/patient's -he tied to fill her prednisone medication w/[facility]-they instructed him that d/c'd the medication-if you did he wants to know why and if you want her to take it-they need a new rx to be sent to [facility]-please advise

## 2023-01-26 NOTE — TELEPHONE ENCOUNTER
Please call , let him know I am with patients but would like to have RN get concerns/questions so I can call him back

## 2023-01-26 NOTE — TELEPHONE ENCOUNTER
Thanks  for calling . I did not stop the medication.  Not sure if it was discontinued accidentally when she was discharged but I refilled it for her to continue low dose daily

## 2023-01-30 ENCOUNTER — HOSPITAL ENCOUNTER (OUTPATIENT)
Dept: CT IMAGING | Age: 73
Discharge: HOME | End: 2023-01-30
Payer: MEDICARE

## 2023-01-30 DIAGNOSIS — Z87.891 PERSONAL HISTORY OF TOBACCO USE: ICD-10-CM

## 2023-01-30 PROCEDURE — 71271 CT THORAX LUNG CANCER SCR C-: CPT

## 2023-01-31 ENCOUNTER — TELEPHONE (OUTPATIENT)
Dept: FAMILY MEDICINE CLINIC | Age: 73
End: 2023-01-31

## 2023-01-31 NOTE — TELEPHONE ENCOUNTER
Patients  called with client in background asking if PCP obtained the results of the CT lung screen obtained 1/20/23. Informed client that scan results were still in progress and not yet released. Client verbalized being worried about results of test.Informed PCP would be notified of call and concern with PCP to contact caregiver once reviewing results .

## 2023-02-03 ENCOUNTER — TELEPHONE (OUTPATIENT)
Dept: FAMILY MEDICINE CLINIC | Age: 73
End: 2023-02-03

## 2023-02-03 RX ORDER — LEVOFLOXACIN 500 MG/1
500 TABLET, FILM COATED ORAL DAILY
Qty: 5 TABLET | Refills: 0 | Status: SHIPPED | OUTPATIENT
Start: 2023-02-03 | End: 2023-02-08

## 2023-02-03 NOTE — TELEPHONE ENCOUNTER
Patient spouse called wondering if the CT scan results are back on his wife. Patient is having anxiety waiting on the results. Patient spouse would like provider to return call.

## 2023-02-06 ENCOUNTER — TELEPHONE (OUTPATIENT)
Dept: FAMILY MEDICINE CLINIC | Age: 73
End: 2023-02-06

## 2023-02-06 NOTE — TELEPHONE ENCOUNTER
[place] [id number] sent request for a nurse consult to [facility] for client. Verbal order given as agreed by PCP with provider to submit an order for staff to fax to [phone] for nurse consult .

## 2023-02-23 ENCOUNTER — TELEPHONE (OUTPATIENT)
Dept: FAMILY MEDICINE CLINIC | Age: 73
End: 2023-02-23

## 2023-02-23 NOTE — TELEPHONE ENCOUNTER
Called [facility] to let know that Provider wants to inform them that Dr. Chalo Mattson prefers patient to be followed by the Hospice MD.   [place] nurse voices understanding  and will change it in patient chart.

## 2023-03-23 RX ORDER — MONTELUKAST SODIUM 10 MG/1
10 TABLET ORAL DAILY
Qty: 30 TABLET | Refills: 11 | Status: SHIPPED | OUTPATIENT
Start: 2023-03-23

## 2023-04-03 RX ORDER — NITROGLYCERIN 0.4 MG/1
0.4 TABLET SUBLINGUAL EVERY 5 MIN PRN
Qty: 25 TABLET | Refills: 3 | Status: SHIPPED | OUTPATIENT
Start: 2023-04-03

## 2023-05-22 ENCOUNTER — HOSPITAL ENCOUNTER (OUTPATIENT)
Age: 73
Setting detail: OBSERVATION
Discharge: HOME | End: 2023-05-23
Attending: EMERGENCY MEDICINE | Admitting: INTERNAL MEDICINE
Payer: MEDICARE

## 2023-05-22 ENCOUNTER — APPOINTMENT (OUTPATIENT)
Dept: GENERAL RADIOLOGY | Age: 73
End: 2023-05-22
Payer: MEDICARE

## 2023-05-22 DIAGNOSIS — J44.1 COPD EXACERBATION (HCC): Primary | ICD-10-CM

## 2023-05-22 DIAGNOSIS — N30.00 ACUTE CYSTITIS WITHOUT HEMATURIA: ICD-10-CM

## 2023-05-22 LAB
ALBUMIN SERPL-MCNC: 3.8 GM/DL (ref 3.4–5)
ALP BLD-CCNC: 40 IU/L (ref 40–129)
ALT SERPL-CCNC: 6 U/L (ref 10–40)
ANION GAP SERPL CALCULATED.3IONS-SCNC: 11 MMOL/L (ref 4–16)
AST SERPL-CCNC: 11 IU/L (ref 15–37)
BACTERIA: ABNORMAL /HPF
BASOPHILS ABSOLUTE: 0.1 K/CU MM
BASOPHILS RELATIVE PERCENT: 0.8 % (ref 0–1)
BILIRUB SERPL-MCNC: 0.8 MG/DL (ref 0–1)
BILIRUBIN URINE: ABNORMAL MG/DL
BLOOD, URINE: NEGATIVE
BUN SERPL-MCNC: 15 MG/DL (ref 6–23)
CALCIUM SERPL-MCNC: 9.2 MG/DL (ref 8.3–10.6)
CAST TYPE: ABNORMAL /HPF
CHLORIDE BLD-SCNC: 93 MMOL/L (ref 99–110)
CLARITY: CLEAR
CO2: 27 MMOL/L (ref 21–32)
COLOR: ABNORMAL
CREAT SERPL-MCNC: 0.2 MG/DL (ref 0.6–1.1)
CRYSTAL TYPE: NEGATIVE /HPF
DIFFERENTIAL TYPE: ABNORMAL
EOSINOPHILS ABSOLUTE: 0.4 K/CU MM
EOSINOPHILS RELATIVE PERCENT: 3.2 % (ref 0–3)
EPITHELIAL CELLS, UA: 5 /HPF
GFR SERPL CREATININE-BSD FRML MDRD: >60 ML/MIN/1.73M2
GLUCOSE SERPL-MCNC: 97 MG/DL (ref 70–99)
GLUCOSE, URINE: 250 MG/DL
HCT VFR BLD CALC: 38.7 % (ref 37–47)
HEMOGLOBIN: 12.7 GM/DL (ref 12.5–16)
IMMATURE NEUTROPHIL %: 0.3 % (ref 0–0.43)
KETONES, URINE: 40 MG/DL
LEUKOCYTE ESTERASE, URINE: ABNORMAL
LYMPHOCYTES ABSOLUTE: 3.6 K/CU MM
LYMPHOCYTES RELATIVE PERCENT: 28.3 % (ref 24–44)
MAGNESIUM: 1.6 MG/DL (ref 1.8–2.4)
MCH RBC QN AUTO: 30.5 PG (ref 27–31)
MCHC RBC AUTO-ENTMCNC: 32.8 % (ref 32–36)
MCV RBC AUTO: 93 FL (ref 78–100)
MONOCYTES ABSOLUTE: 1.3 K/CU MM
MONOCYTES RELATIVE PERCENT: 10.5 % (ref 0–4)
NITRITE URINE, QUANTITATIVE: POSITIVE
PDW BLD-RTO: 12.8 % (ref 11.7–14.9)
PH, URINE: 5 (ref 5–8)
PLATELET # BLD: 340 K/CU MM (ref 140–440)
PMV BLD AUTO: 9.4 FL (ref 7.5–11.1)
POTASSIUM SERPL-SCNC: 4.2 MMOL/L (ref 3.5–5.1)
PROTEIN UA: 30 MG/DL
RBC # BLD: 4.16 M/CU MM (ref 4.2–5.4)
RBC URINE: 1 /HPF (ref 0–6)
SEGMENTED NEUTROPHILS ABSOLUTE COUNT: 7.3 K/CU MM
SEGMENTED NEUTROPHILS RELATIVE PERCENT: 56.9 % (ref 36–66)
SODIUM BLD-SCNC: 131 MMOL/L (ref 135–145)
SPECIFIC GRAVITY UA: 1.02 (ref 1–1.03)
TOTAL IMMATURE NEUTOROPHIL: 0.04 K/CU MM
TOTAL PROTEIN: 7.2 GM/DL (ref 6.4–8.2)
TROPONIN T: <0.01 NG/ML
UROBILINOGEN, URINE: 2 MG/DL (ref 0.2–1)
WBC # BLD: 12.8 K/CU MM (ref 4–10.5)
WBC UA: 15 /HPF (ref 0–5)

## 2023-05-22 PROCEDURE — 84484 ASSAY OF TROPONIN QUANT: CPT

## 2023-05-22 PROCEDURE — 6360000002 HC RX W HCPCS: Performed by: EMERGENCY MEDICINE

## 2023-05-22 PROCEDURE — 96375 TX/PRO/DX INJ NEW DRUG ADDON: CPT

## 2023-05-22 PROCEDURE — 87086 URINE CULTURE/COLONY COUNT: CPT

## 2023-05-22 PROCEDURE — 2580000003 HC RX 258: Performed by: EMERGENCY MEDICINE

## 2023-05-22 PROCEDURE — 96365 THER/PROPH/DIAG IV INF INIT: CPT

## 2023-05-22 PROCEDURE — 87430 STREP A AG IA: CPT

## 2023-05-22 PROCEDURE — 83880 ASSAY OF NATRIURETIC PEPTIDE: CPT

## 2023-05-22 PROCEDURE — 87077 CULTURE AEROBIC IDENTIFY: CPT

## 2023-05-22 PROCEDURE — 83735 ASSAY OF MAGNESIUM: CPT

## 2023-05-22 PROCEDURE — 99285 EMERGENCY DEPT VISIT HI MDM: CPT

## 2023-05-22 PROCEDURE — 80053 COMPREHEN METABOLIC PANEL: CPT

## 2023-05-22 PROCEDURE — 84145 PROCALCITONIN (PCT): CPT

## 2023-05-22 PROCEDURE — 85025 COMPLETE CBC W/AUTO DIFF WBC: CPT

## 2023-05-22 PROCEDURE — 87186 SC STD MICRODIL/AGAR DIL: CPT

## 2023-05-22 PROCEDURE — 71046 X-RAY EXAM CHEST 2 VIEWS: CPT

## 2023-05-22 PROCEDURE — 81001 URINALYSIS AUTO W/SCOPE: CPT

## 2023-05-22 PROCEDURE — 93005 ELECTROCARDIOGRAM TRACING: CPT | Performed by: EMERGENCY MEDICINE

## 2023-05-22 PROCEDURE — 96367 TX/PROPH/DG ADDL SEQ IV INF: CPT

## 2023-05-22 PROCEDURE — 87081 CULTURE SCREEN ONLY: CPT

## 2023-05-22 PROCEDURE — G0378 HOSPITAL OBSERVATION PER HR: HCPCS

## 2023-05-22 RX ORDER — PREDNISONE 1 MG/1
5 TABLET ORAL DAILY
Status: CANCELLED | OUTPATIENT
Start: 2023-05-23

## 2023-05-22 RX ORDER — CLOTRIMAZOLE 10 MG/1
10 LOZENGE ORAL; TOPICAL ONCE
Status: DISCONTINUED | OUTPATIENT
Start: 2023-05-22 | End: 2023-05-23

## 2023-05-22 RX ORDER — METHYLPREDNISOLONE SODIUM SUCCINATE 125 MG/2ML
125 INJECTION, POWDER, LYOPHILIZED, FOR SOLUTION INTRAMUSCULAR; INTRAVENOUS ONCE
Status: COMPLETED | OUTPATIENT
Start: 2023-05-22 | End: 2023-05-22

## 2023-05-22 RX ORDER — ACETAMINOPHEN 325 MG/1
650 TABLET ORAL EVERY 4 HOURS PRN
Status: DISCONTINUED | OUTPATIENT
Start: 2023-05-22 | End: 2023-05-23 | Stop reason: HOSPADM

## 2023-05-22 RX ORDER — AZITHROMYCIN 250 MG/1
500 TABLET, FILM COATED ORAL DAILY
Status: DISCONTINUED | OUTPATIENT
Start: 2023-05-23 | End: 2023-05-23 | Stop reason: HOSPADM

## 2023-05-22 RX ORDER — ENOXAPARIN SODIUM 100 MG/ML
30 INJECTION SUBCUTANEOUS DAILY
Status: DISCONTINUED | OUTPATIENT
Start: 2023-05-23 | End: 2023-05-23 | Stop reason: HOSPADM

## 2023-05-22 RX ORDER — MAGNESIUM SULFATE IN WATER 40 MG/ML
2000 INJECTION, SOLUTION INTRAVENOUS ONCE
Status: COMPLETED | OUTPATIENT
Start: 2023-05-22 | End: 2023-05-23

## 2023-05-22 RX ORDER — METHYLPREDNISOLONE SODIUM SUCCINATE 40 MG/ML
40 INJECTION, POWDER, LYOPHILIZED, FOR SOLUTION INTRAMUSCULAR; INTRAVENOUS EVERY 12 HOURS
Status: DISCONTINUED | OUTPATIENT
Start: 2023-05-23 | End: 2023-05-23 | Stop reason: HOSPADM

## 2023-05-22 RX ADMIN — METHYLPREDNISOLONE SODIUM SUCCINATE 125 MG: 125 INJECTION, POWDER, FOR SOLUTION INTRAMUSCULAR; INTRAVENOUS at 21:25

## 2023-05-22 RX ADMIN — MAGNESIUM SULFATE HEPTAHYDRATE 2000 MG: 40 INJECTION, SOLUTION INTRAVENOUS at 22:28

## 2023-05-22 RX ADMIN — CEFTRIAXONE SODIUM 1000 MG: 1 INJECTION, POWDER, FOR SOLUTION INTRAMUSCULAR; INTRAVENOUS at 21:51

## 2023-05-22 ASSESSMENT — PAIN SCALES - GENERAL: PAINLEVEL_OUTOF10: 8

## 2023-05-22 ASSESSMENT — PAIN DESCRIPTION - LOCATION: LOCATION: THROAT

## 2023-05-22 ASSESSMENT — PAIN DESCRIPTION - DESCRIPTORS: DESCRIPTORS: SORE

## 2023-05-23 VITALS
HEART RATE: 93 BPM | OXYGEN SATURATION: 95 % | RESPIRATION RATE: 20 BRPM | WEIGHT: 94.9 LBS | SYSTOLIC BLOOD PRESSURE: 123 MMHG | BODY MASS INDEX: 15.25 KG/M2 | TEMPERATURE: 97.5 F | HEIGHT: 66 IN | DIASTOLIC BLOOD PRESSURE: 86 MMHG

## 2023-05-23 LAB
ALBUMIN SERPL-MCNC: 3.9 GM/DL (ref 3.4–5)
ALP BLD-CCNC: 40 IU/L (ref 40–129)
ALT SERPL-CCNC: 6 U/L (ref 10–40)
ANION GAP SERPL CALCULATED.3IONS-SCNC: 14 MMOL/L (ref 4–16)
AST SERPL-CCNC: 10 IU/L (ref 15–37)
B PARAP IS1001 DNA NPH QL NAA+NON-PROBE: NOT DETECTED
B PERT.PT PRMT NPH QL NAA+NON-PROBE: NOT DETECTED
BASOPHILS ABSOLUTE: 0.1 K/CU MM
BASOPHILS RELATIVE PERCENT: 0.6 % (ref 0–1)
BILIRUB SERPL-MCNC: 0.4 MG/DL (ref 0–1)
BUN SERPL-MCNC: 14 MG/DL (ref 6–23)
C PNEUM DNA NPH QL NAA+NON-PROBE: NOT DETECTED
CALCIUM SERPL-MCNC: 8.5 MG/DL (ref 8.3–10.6)
CHLORIDE BLD-SCNC: 94 MMOL/L (ref 99–110)
CO2: 21 MMOL/L (ref 21–32)
CREAT SERPL-MCNC: 0.2 MG/DL (ref 0.6–1.1)
DIFFERENTIAL TYPE: ABNORMAL
EKG ATRIAL RATE: 85 BPM
EKG DIAGNOSIS: NORMAL
EKG P AXIS: 56 DEGREES
EKG P-R INTERVAL: 196 MS
EKG Q-T INTERVAL: 386 MS
EKG QRS DURATION: 130 MS
EKG QTC CALCULATION (BAZETT): 459 MS
EKG R AXIS: -62 DEGREES
EKG T AXIS: 104 DEGREES
EKG VENTRICULAR RATE: 85 BPM
EOSINOPHILS ABSOLUTE: 0 K/CU MM
EOSINOPHILS RELATIVE PERCENT: 0.1 % (ref 0–3)
FLUAV H1 2009 PAN RNA NPH NAA+NON-PROBE: NOT DETECTED
FLUAV H1 RNA NPH QL NAA+NON-PROBE: NOT DETECTED
FLUAV H3 RNA NPH QL NAA+NON-PROBE: NOT DETECTED
FLUAV RNA NPH QL NAA+NON-PROBE: NOT DETECTED
FLUBV RNA NPH QL NAA+NON-PROBE: NOT DETECTED
GFR SERPL CREATININE-BSD FRML MDRD: >60 ML/MIN/1.73M2
GLUCOSE SERPL-MCNC: 129 MG/DL (ref 70–99)
HADV DNA NPH QL NAA+NON-PROBE: NOT DETECTED
HCOV 229E RNA NPH QL NAA+NON-PROBE: NOT DETECTED
HCOV HKU1 RNA NPH QL NAA+NON-PROBE: NOT DETECTED
HCOV NL63 RNA NPH QL NAA+NON-PROBE: NOT DETECTED
HCOV OC43 RNA NPH QL NAA+NON-PROBE: NOT DETECTED
HCT VFR BLD CALC: 38 % (ref 37–47)
HEMOGLOBIN: 12.4 GM/DL (ref 12.5–16)
HMPV RNA NPH QL NAA+NON-PROBE: NOT DETECTED
HPIV1 RNA NPH QL NAA+NON-PROBE: NOT DETECTED
HPIV2 RNA NPH QL NAA+NON-PROBE: NOT DETECTED
HPIV3 RNA NPH QL NAA+NON-PROBE: NOT DETECTED
HPIV4 RNA NPH QL NAA+NON-PROBE: NOT DETECTED
IMMATURE NEUTROPHIL %: 0.5 % (ref 0–0.43)
LYMPHOCYTES ABSOLUTE: 1.5 K/CU MM
LYMPHOCYTES RELATIVE PERCENT: 19.2 % (ref 24–44)
M PNEUMO DNA NPH QL NAA+NON-PROBE: NOT DETECTED
MCH RBC QN AUTO: 30.8 PG (ref 27–31)
MCHC RBC AUTO-ENTMCNC: 32.6 % (ref 32–36)
MCV RBC AUTO: 94.5 FL (ref 78–100)
MONOCYTES ABSOLUTE: 0.1 K/CU MM
MONOCYTES RELATIVE PERCENT: 0.8 % (ref 0–4)
PDW BLD-RTO: 12.7 % (ref 11.7–14.9)
PLATELET # BLD: 335 K/CU MM (ref 140–440)
PMV BLD AUTO: 9.8 FL (ref 7.5–11.1)
POTASSIUM SERPL-SCNC: 4.3 MMOL/L (ref 3.5–5.1)
PRO-BNP: 356 PG/ML
PROCALCITONIN SERPL-MCNC: 0.05 NG/ML
RBC # BLD: 4.02 M/CU MM (ref 4.2–5.4)
RSV RNA NPH QL NAA+NON-PROBE: NOT DETECTED
RV+EV RNA NPH QL NAA+NON-PROBE: NOT DETECTED
SARS-COV-2 RNA NPH QL NAA+NON-PROBE: NOT DETECTED
SEGMENTED NEUTROPHILS ABSOLUTE COUNT: 6.1 K/CU MM
SEGMENTED NEUTROPHILS RELATIVE PERCENT: 78.8 % (ref 36–66)
SODIUM BLD-SCNC: 129 MMOL/L (ref 135–145)
TOTAL IMMATURE NEUTOROPHIL: 0.04 K/CU MM
TOTAL PROTEIN: 6.5 GM/DL (ref 6.4–8.2)
WBC # BLD: 7.7 K/CU MM (ref 4–10.5)

## 2023-05-23 PROCEDURE — 6370000000 HC RX 637 (ALT 250 FOR IP): Performed by: NURSE PRACTITIONER

## 2023-05-23 PROCEDURE — 6370000000 HC RX 637 (ALT 250 FOR IP): Performed by: FAMILY MEDICINE

## 2023-05-23 PROCEDURE — 6360000002 HC RX W HCPCS: Performed by: FAMILY MEDICINE

## 2023-05-23 PROCEDURE — 93010 ELECTROCARDIOGRAM REPORT: CPT | Performed by: INTERNAL MEDICINE

## 2023-05-23 PROCEDURE — 80053 COMPREHEN METABOLIC PANEL: CPT

## 2023-05-23 PROCEDURE — 2700000000 HC OXYGEN THERAPY PER DAY

## 2023-05-23 PROCEDURE — G0378 HOSPITAL OBSERVATION PER HR: HCPCS

## 2023-05-23 PROCEDURE — 94640 AIRWAY INHALATION TREATMENT: CPT

## 2023-05-23 PROCEDURE — 94761 N-INVAS EAR/PLS OXIMETRY MLT: CPT

## 2023-05-23 PROCEDURE — 0202U NFCT DS 22 TRGT SARS-COV-2: CPT

## 2023-05-23 PROCEDURE — 85025 COMPLETE CBC W/AUTO DIFF WBC: CPT

## 2023-05-23 RX ORDER — ALBUTEROL SULFATE 90 UG/1
2 AEROSOL, METERED RESPIRATORY (INHALATION) EVERY 6 HOURS PRN
Status: DISCONTINUED | OUTPATIENT
Start: 2023-05-23 | End: 2023-05-23 | Stop reason: HOSPADM

## 2023-05-23 RX ORDER — LORAZEPAM 0.5 MG/1
0.5 TABLET ORAL EVERY 4 HOURS PRN
COMMUNITY

## 2023-05-23 RX ORDER — DOCUSATE SODIUM 100 MG/1
100 CAPSULE, LIQUID FILLED ORAL DAILY PRN
Status: DISCONTINUED | OUTPATIENT
Start: 2023-05-23 | End: 2023-05-23 | Stop reason: HOSPADM

## 2023-05-23 RX ORDER — CEFDINIR 300 MG/1
300 CAPSULE ORAL 2 TIMES DAILY
Qty: 10 CAPSULE | Refills: 0 | Status: SHIPPED | OUTPATIENT
Start: 2023-05-23 | End: 2023-05-28

## 2023-05-23 RX ORDER — DOXEPIN HYDROCHLORIDE 10 MG/1
10 CAPSULE ORAL NIGHTLY
Status: DISCONTINUED | OUTPATIENT
Start: 2023-05-23 | End: 2023-05-23 | Stop reason: HOSPADM

## 2023-05-23 RX ORDER — MORPHINE SULFATE 100 MG/5ML
10 SOLUTION ORAL
COMMUNITY

## 2023-05-23 RX ORDER — CARVEDILOL 12.5 MG/1
12.5 TABLET ORAL 2 TIMES DAILY WITH MEALS
Status: DISCONTINUED | OUTPATIENT
Start: 2023-05-23 | End: 2023-05-23 | Stop reason: HOSPADM

## 2023-05-23 RX ORDER — CLOTRIMAZOLE 10 MG/1
10 LOZENGE ORAL; TOPICAL
Status: DISCONTINUED | OUTPATIENT
Start: 2023-05-23 | End: 2023-05-23 | Stop reason: HOSPADM

## 2023-05-23 RX ORDER — ISOSORBIDE MONONITRATE 60 MG/1
TABLET, EXTENDED RELEASE ORAL
Qty: 30 TABLET | Refills: 0 | OUTPATIENT
Start: 2023-05-23

## 2023-05-23 RX ORDER — MORPHINE SULFATE 20 MG/ML
10 SOLUTION ORAL
Status: DISCONTINUED | OUTPATIENT
Start: 2023-05-23 | End: 2023-05-23 | Stop reason: HOSPADM

## 2023-05-23 RX ORDER — IPRATROPIUM BROMIDE AND ALBUTEROL SULFATE 2.5; .5 MG/3ML; MG/3ML
1 SOLUTION RESPIRATORY (INHALATION)
Status: DISCONTINUED | OUTPATIENT
Start: 2023-05-23 | End: 2023-05-23 | Stop reason: HOSPADM

## 2023-05-23 RX ORDER — LISINOPRIL 20 MG/1
20 TABLET ORAL DAILY
Status: DISCONTINUED | OUTPATIENT
Start: 2023-05-23 | End: 2023-05-23 | Stop reason: HOSPADM

## 2023-05-23 RX ORDER — AZITHROMYCIN 500 MG/1
500 TABLET, FILM COATED ORAL DAILY
Qty: 4 TABLET | Refills: 0 | Status: SHIPPED | OUTPATIENT
Start: 2023-05-24 | End: 2023-05-28

## 2023-05-23 RX ORDER — ASPIRIN 81 MG/1
81 TABLET, CHEWABLE ORAL DAILY
Status: DISCONTINUED | OUTPATIENT
Start: 2023-05-23 | End: 2023-05-23 | Stop reason: HOSPADM

## 2023-05-23 RX ORDER — ESCITALOPRAM OXALATE 5 MG/1
5 TABLET ORAL DAILY
Status: DISCONTINUED | OUTPATIENT
Start: 2023-05-23 | End: 2023-05-23 | Stop reason: HOSPADM

## 2023-05-23 RX ORDER — ATORVASTATIN CALCIUM 40 MG/1
40 TABLET, FILM COATED ORAL DAILY
Status: DISCONTINUED | OUTPATIENT
Start: 2023-05-23 | End: 2023-05-23 | Stop reason: HOSPADM

## 2023-05-23 RX ORDER — LORAZEPAM 0.5 MG/1
0.5 TABLET ORAL EVERY 4 HOURS PRN
Status: DISCONTINUED | OUTPATIENT
Start: 2023-05-23 | End: 2023-05-23 | Stop reason: HOSPADM

## 2023-05-23 RX ORDER — ISOSORBIDE MONONITRATE 60 MG/1
60 TABLET, EXTENDED RELEASE ORAL DAILY
Status: DISCONTINUED | OUTPATIENT
Start: 2023-05-23 | End: 2023-05-23 | Stop reason: HOSPADM

## 2023-05-23 RX ORDER — ESCITALOPRAM OXALATE 5 MG/1
5 TABLET ORAL DAILY
COMMUNITY

## 2023-05-23 RX ORDER — PREDNISONE 10 MG/1
TABLET ORAL
Qty: 30 TABLET | Refills: 0 | Status: SHIPPED | OUTPATIENT
Start: 2023-05-23 | End: 2023-06-04

## 2023-05-23 RX ORDER — ATORVASTATIN CALCIUM 40 MG/1
40 TABLET, FILM COATED ORAL DAILY
COMMUNITY

## 2023-05-23 RX ORDER — MONTELUKAST SODIUM 10 MG/1
10 TABLET ORAL DAILY
Status: DISCONTINUED | OUTPATIENT
Start: 2023-05-23 | End: 2023-05-23 | Stop reason: HOSPADM

## 2023-05-23 RX ORDER — BUSPIRONE HYDROCHLORIDE 5 MG/1
5 TABLET ORAL 2 TIMES DAILY
Status: DISCONTINUED | OUTPATIENT
Start: 2023-05-23 | End: 2023-05-23 | Stop reason: HOSPADM

## 2023-05-23 RX ORDER — ARFORMOTEROL TARTRATE 15 UG/2ML
15 SOLUTION RESPIRATORY (INHALATION) 2 TIMES DAILY
Status: DISCONTINUED | OUTPATIENT
Start: 2023-05-23 | End: 2023-05-23 | Stop reason: HOSPADM

## 2023-05-23 RX ADMIN — LORAZEPAM 0.5 MG: 0.5 TABLET ORAL at 01:08

## 2023-05-23 RX ADMIN — ESCITALOPRAM 5 MG: 5 TABLET, FILM COATED ORAL at 10:09

## 2023-05-23 RX ADMIN — NYSTATIN 500000 UNITS: 100000 SUSPENSION ORAL at 01:02

## 2023-05-23 RX ADMIN — ENOXAPARIN SODIUM 30 MG: 100 INJECTION SUBCUTANEOUS at 10:05

## 2023-05-23 RX ADMIN — ISOSORBIDE MONONITRATE 60 MG: 60 TABLET, EXTENDED RELEASE ORAL at 10:04

## 2023-05-23 RX ADMIN — BUSPIRONE HYDROCHLORIDE 5 MG: 5 TABLET ORAL at 10:04

## 2023-05-23 RX ADMIN — LISINOPRIL 20 MG: 20 TABLET ORAL at 10:04

## 2023-05-23 RX ADMIN — METHYLPREDNISOLONE SODIUM SUCCINATE 40 MG: 40 INJECTION, POWDER, FOR SOLUTION INTRAMUSCULAR; INTRAVENOUS at 10:05

## 2023-05-23 RX ADMIN — ACETAMINOPHEN 650 MG: 325 TABLET ORAL at 10:09

## 2023-05-23 RX ADMIN — ARFORMOTEROL TARTRATE 15 MCG: 15 SOLUTION RESPIRATORY (INHALATION) at 07:38

## 2023-05-23 RX ADMIN — BUSPIRONE HYDROCHLORIDE 5 MG: 5 TABLET ORAL at 01:05

## 2023-05-23 RX ADMIN — ATORVASTATIN CALCIUM 40 MG: 40 TABLET, FILM COATED ORAL at 10:04

## 2023-05-23 RX ADMIN — NYSTATIN 500000 UNITS: 100000 SUSPENSION ORAL at 10:06

## 2023-05-23 RX ADMIN — MONTELUKAST 10 MG: 10 TABLET, FILM COATED ORAL at 10:04

## 2023-05-23 RX ADMIN — IPRATROPIUM BROMIDE AND ALBUTEROL SULFATE 1 AMPULE: .5; 2.5 SOLUTION RESPIRATORY (INHALATION) at 07:38

## 2023-05-23 RX ADMIN — AZITHROMYCIN MONOHYDRATE 500 MG: 250 TABLET ORAL at 10:04

## 2023-05-23 RX ADMIN — IPRATROPIUM BROMIDE AND ALBUTEROL SULFATE 1 AMPULE: .5; 2.5 SOLUTION RESPIRATORY (INHALATION) at 11:06

## 2023-05-23 RX ADMIN — ASPIRIN 81 MG CHEWABLE TABLET 81 MG: 81 TABLET CHEWABLE at 10:04

## 2023-05-23 RX ADMIN — CARVEDILOL 12.5 MG: 12.5 TABLET, FILM COATED ORAL at 10:04

## 2023-05-23 ASSESSMENT — PAIN DESCRIPTION - ONSET: ONSET: GRADUAL

## 2023-05-23 ASSESSMENT — PAIN DESCRIPTION - ORIENTATION: ORIENTATION: RIGHT;LEFT

## 2023-05-23 ASSESSMENT — PAIN DESCRIPTION - FREQUENCY: FREQUENCY: CONTINUOUS

## 2023-05-23 ASSESSMENT — PAIN SCALES - GENERAL
PAINLEVEL_OUTOF10: 0
PAINLEVEL_OUTOF10: 4

## 2023-05-23 ASSESSMENT — PAIN DESCRIPTION - DESCRIPTORS: DESCRIPTORS: PRESSURE;THROBBING

## 2023-05-23 ASSESSMENT — PAIN - FUNCTIONAL ASSESSMENT: PAIN_FUNCTIONAL_ASSESSMENT: ACTIVITIES ARE NOT PREVENTED

## 2023-05-23 ASSESSMENT — PAIN DESCRIPTION - PAIN TYPE: TYPE: ACUTE PAIN

## 2023-05-23 ASSESSMENT — PAIN DESCRIPTION - LOCATION: LOCATION: HEAD

## 2023-05-23 ASSESSMENT — PAIN DESCRIPTION - DIRECTION: RADIATING_TOWARDS: NO

## 2023-05-23 NOTE — PLAN OF CARE
Problem: Discharge Planning  Goal: Discharge to home or other facility with appropriate resources  5/23/2023 1122 by Diony Dave RN  Outcome: Completed  5/23/2023 0032 by Erin Kee RN  Outcome: Progressing     Problem: Pain  Goal: Verbalizes/displays adequate comfort level or baseline comfort level  5/23/2023 1122 by Diony Dave RN  Outcome: Completed  5/23/2023 0032 by Erin Kee RN  Outcome: Progressing     Problem: Safety - Adult  Goal: Free from fall injury  5/23/2023 1122 by Diony Dave RN  Outcome: Completed  5/23/2023 0032 by Erin Kee RN  Outcome: Progressing     Problem: Skin/Tissue Integrity  Goal: Absence of new skin breakdown  Description: 1. Monitor for areas of redness and/or skin breakdown  2. Assess vascular access sites hourly  3. Every 4-6 hours minimum:  Change oxygen saturation probe site  4. Every 4-6 hours:  If on nasal continuous positive airway pressure, respiratory therapy assess nares and determine need for appliance change or resting period. 5/23/2023 1122 by Diony Dave RN  Outcome: Completed  5/23/2023 0032 by Erin Kee RN  Outcome: Progressing     Problem: Confusion  Goal: Confusion, delirium, dementia, or psychosis is improved or at baseline  Description: INTERVENTIONS:  1. Assess for possible contributors to thought disturbance, including medications, impaired vision or hearing, underlying metabolic abnormalities, dehydration, psychiatric diagnoses, and notify attending LIP  2. [place] high risk fall precautions, as indicated  3. Provide frequent short contacts to provide reality reorientation, refocusing and direction  4. Decrease environmental stimuli, including noise as appropriate  5. Monitor and intervene to maintain adequate nutrition, hydration, elimination, sleep and activity  6. If unable to ensure safety without constant attention obtain sitter and review sitter guidelines with assigned personnel  7.  Initiate Psychosocial CNS

## 2023-05-23 NOTE — ED NOTES
Transferred to inpatient Rm 3 in stable condition by Damaris Reis.       Nola Read, CHANG  05/22/23 [id number]

## 2023-05-23 NOTE — DISCHARGE SUMMARY
V2.0  Discharge Summary    Name:  Rama Romano /Age/Sex: 1950 (68 y.o. female)   Admit Date: 2023  Discharge Date: 23    MRN & CSN:  [id number] & [id number] Encounter Date and Time 23 10:29 AM EDT    Attending:  Rex Ruiz MD Discharging Provider: CHEPE Ridley NP       Hospital Course:     Brief HPI:   Rama Romano is a 68 y.o. female who presents with worsening shortness of breath, dry cough with sore throat and ear pain. She states she has been feeling like this for the past 2 to 3 days denies any sick contacts. She is on 2 L of supplemental oxygen chronically with an increase of oxygen demand. She also complains of dysuria and denies hematuria, urgency, retention, fever, nausea, vomiting, or chills. She denies chest pain. She denies any swelling of lower extremities. Denies any history of DVTs or PEs or trauma. Patient was given an albuterol treatment by EMS prior to arrival.  She states she is a hospice patient of [facility] and is a full code. Patient seen and examined today she is doing well, she has no complaints. She would like to be discharged home. She is currently on 2 L nasal cannula which is her baseline. She will be discharged home to return of [facility] care and in stable condition. Brief Problem Based Course:   Acute on chronic respiratory failure secondary to COPD exacerbation, patient is now on baseline oxygen of 2 L. Apparently she required 5 L while in the emergency department. End-stage COPD, with exacerbation, exacerbation has resolved continue current home medications Home with Rx for prednisone taper then resume her 5 mg daily. Home with azithromycin 500 mg daily x4 more days. Chest x-ray showed no acute processes no acute cardiopulmonary processes stable cardiac silhouette enlargement.   Acute cystitis without hematuria, patient is not having any complaints at this time she will discharge home with

## 2023-05-23 NOTE — PLAN OF CARE
Problem: Discharge Planning  Goal: Discharge to home or other facility with appropriate resources  Outcome: Progressing     Problem: Pain  Goal: Verbalizes/displays adequate comfort level or baseline comfort level  Outcome: Progressing     Problem: Safety - Adult  Goal: Free from fall injury  Outcome: Progressing     Problem: Skin/Tissue Integrity  Goal: Absence of new skin breakdown  Description: 1. Monitor for areas of redness and/or skin breakdown  2. Assess vascular access sites hourly  3. Every 4-6 hours minimum:  Change oxygen saturation probe site  4. Every 4-6 hours:  If on nasal continuous positive airway pressure, respiratory therapy assess nares and determine need for appliance change or resting period. Outcome: Progressing     Problem: Confusion  Goal: Confusion, delirium, dementia, or psychosis is improved or at baseline  Description: INTERVENTIONS:  1. Assess for possible contributors to thought disturbance, including medications, impaired vision or hearing, underlying metabolic abnormalities, dehydration, psychiatric diagnoses, and notify attending LIP  2. [place] high risk fall precautions, as indicated  3. Provide frequent short contacts to provide reality reorientation, refocusing and direction  4. Decrease environmental stimuli, including noise as appropriate  5. Monitor and intervene to maintain adequate nutrition, hydration, elimination, sleep and activity  6. If unable to ensure safety without constant attention obtain sitter and review sitter guidelines with assigned personnel  7.  Initiate Psychosocial CNS and Spiritual Care consult, as indicated  Outcome: Progressing     Problem: Respiratory - Adult  Goal: Achieves optimal ventilation and oxygenation  Outcome: Progressing     Problem: Genitourinary - Adult  Goal: Absence of urinary retention  Outcome: Progressing     Problem: Infection - Adult  Goal: Absence of infection at discharge  Outcome: Progressing

## 2023-05-23 NOTE — H&P
V2.0  History and Physical      Name:  Yeni Adams /Age/Sex: 1950  (68 y.o. female)   MRN & CSN:  [id number] & [id number] Encounter Date/Time: 2023 10:19 PM EDT   Location:   PCP: Julianne Mccauley, [facility] Day: 1    Assessment and Plan:   Yeni Adams is a 68 y.o. female with a past medical history of COPD, Chronic hypoxia respiratory failure, Chronic systolic HF, Malnutrition, HTN, S/P CABG x 4 ( ) who presents with COPD with acute exacerbation and UTI. COPD Exacerbation   - presented with increased SOB, dry cough, and sore throat. - CXR with no acute process: No acute cardiopulmonary process. Stable cardiac silhouette enlargement. Moderate to severe COPD. Multiple osteoporotic compression fracture deformities appears similar to   prior given differences in technique. - On home O2 2LPM -> increased to 5LPM via NC  - VBG/ABG was not completed in ED; pt O2 saturation >95% on 2LPM  - afebrile without leukocytosis   - No signs of sepsis.   Will trend WBC, CBC, consider Lactate if febrile  - IVF withheld at this time; encourage PO fluids with close monitoring I&O  - NEG STREP  - Will check procalcitonin, viral respiratory panel pending  - continue home inhalers; hold PO QD Prednisone  - TELE/pulse ox monitoring  - Start scheduled/PRN nebs, steroids IV transition to PO when able, PO Azithromycin 500 MG BID, pulmonary hygiene   - monitor respiratory status      Acute Uncomplicated/Complicated UTI/Pyelonephritis   - presented with complaints of dysuria,   - UA with Bacteria, + Nitrate, Orange in color  - WBC trace, afebrile, HDS  - Urine culture pending  - continue rocephin 1000mg QD    Hypomagnesemia  - Mag 1.6; replaced in ED  - Will repeat CMP, Mag in AM  - Monitor for EKG changes, electrolyte changes    Oral candidiasis  - pt reported she is being treated for this  - Oral clotrimazole given in ED; will continue while inpt (Nystatin suspension)  - strict oral

## 2023-05-23 NOTE — CARE COORDINATION
05/23/23 0650   Service Assessment   Patient Orientation Alert and Oriented   Cognition Alert   History Provided By Patient   Primary Caregiver Spouse   Support Systems Spouse/Significant Other;Hospice  [facility])   [facility] is: Patient Declined (Legal Next of Kin Remains as Decision Maker)   PCP Verified by CM Yes   Prior Functional Level Independent in ADLs/IADLs   Current Functional Level Independent in ADLs/IADLs   Can patient return to prior living arrangement Yes   Ability to make needs known: Good   Family able to assist with home care needs: Yes   Would you like for me to discuss the discharge plan with any other family members/significant others, and if so, who? Yes  (Spouse)   Financial Resources Medicare   [address] History   Lives With Spouse   Type of Home House   Home Layout One level   Home Equipment Oxygen;Cane;Walker, rolling  (Nebulizer machine)   Receives Help From Family   ADL Assistance Independent   Homemaking Assistance Needs assistance   Homemaking Responsibilities No   Ambulation Assistance Independent   Transfer Assistance Independent   Active  Yes   Discharge Planning   Type of [place] Spouse/Significant Other   Current Services Prior To Admission Hospice Services; Oxygen Therapy   Potential Assistance Needed N/A   DME Ordered? No   Potential Assistance Purchasing Medications No   Type of Home Care Services Hospice   Patient expects to be discharged to: House   One/Two Story Residence One story   History of falls? 0   Services At/After Discharge   Transition of Care Consult (CM Consult) N/A   Services At/After Discharge [facility] Provided?  No   Mode of Transport at Discharge Self   Confirm Follow Up Transport Family   Condition of Participation: Discharge Planning   The Plan for Transition of Care is related to the following treatment goals: Patient

## 2023-05-23 NOTE — PROGRESS NOTES
Discharge instructions given to patient and , all questions answered, verbalized understanding,patient taken to car in wheelchair with all belongings including home medications, to be driven home by , home oxygen applied.

## 2023-05-25 LAB
CULTURE: ABNORMAL
CULTURE: ABNORMAL
CULTURE: NORMAL
Lab: ABNORMAL
Lab: NORMAL
SPECIMEN: ABNORMAL
SPECIMEN: NORMAL
STREP A DIRECT SCREEN: NEGATIVE

## 2023-06-20 NOTE — TELEPHONE ENCOUNTER
Patient's  called asking about quarantining because the patient is (+) covid-they did a test from [facility] and it was detected-she is having diarrhea-headache-cough-and body aches-I instructed him that he will have to quarantine and his grandson-he verbalizes understanding 1

## 2023-06-21 RX ORDER — BUSPIRONE HYDROCHLORIDE 5 MG/1
TABLET ORAL
Qty: 60 TABLET | Refills: 5 | Status: SHIPPED | OUTPATIENT
Start: 2023-06-21

## 2023-07-20 RX ORDER — CARVEDILOL 12.5 MG/1
TABLET ORAL
Qty: 30 TABLET | Refills: 5 | Status: SHIPPED | OUTPATIENT
Start: 2023-07-20

## 2023-08-07 RX ORDER — DOXEPIN HYDROCHLORIDE 10 MG/1
10 CAPSULE ORAL NIGHTLY
Qty: 30 CAPSULE | Refills: 11 | Status: SHIPPED | OUTPATIENT
Start: 2023-08-07

## 2023-08-15 ENCOUNTER — TELEPHONE (OUTPATIENT)
Dept: FAMILY MEDICINE CLINIC | Age: 73
End: 2023-08-15

## 2023-08-15 RX ORDER — IBANDRONATE SODIUM 150 MG/1
150 TABLET, FILM COATED ORAL
Qty: 1 TABLET | Refills: 11 | Status: SHIPPED | OUTPATIENT
Start: 2023-08-15

## 2023-08-15 NOTE — TELEPHONE ENCOUNTER
Called patient and spoke with Elizabeth Burton (patients  on communication release) . Reviewed providers note with Elizabeth Burton. Elizabeth Burton voices understanding. No questions or concerns at this time.

## 2023-08-15 NOTE — TELEPHONE ENCOUNTER
called and would like to know if wife is to continue to take medication ibandronate 150mg. There is currently not a refill left on this medication.  Please Advise

## 2023-09-06 ENCOUNTER — TELEPHONE (OUTPATIENT)
Dept: FAMILY MEDICINE CLINIC | Age: 73
End: 2023-09-06

## 2023-09-06 RX ORDER — ALBUTEROL SULFATE 90 UG/1
2 AEROSOL, METERED RESPIRATORY (INHALATION) EVERY 6 HOURS PRN
Qty: 18 G | Refills: 3 | Status: SHIPPED | OUTPATIENT
Start: 2023-09-06

## 2023-09-06 RX ORDER — PREDNISONE 5 MG/1
5 TABLET ORAL DAILY
Qty: 30 TABLET | Refills: 11 | Status: SHIPPED | OUTPATIENT
Start: 2023-09-06

## 2024-01-08 RX ORDER — CARVEDILOL 12.5 MG/1
TABLET ORAL
Qty: 30 TABLET | Refills: 0 | Status: SHIPPED | OUTPATIENT
Start: 2024-01-08 | End: 2024-02-07

## 2024-01-25 RX ORDER — ALBUTEROL SULFATE 90 UG/1
2 AEROSOL, METERED RESPIRATORY (INHALATION) EVERY 6 HOURS PRN
Qty: 18 G | Refills: 3 | Status: SHIPPED | OUTPATIENT
Start: 2024-01-25

## 2024-02-07 RX ORDER — CARVEDILOL 12.5 MG/1
TABLET ORAL
Qty: 30 TABLET | Refills: 0 | Status: SHIPPED | OUTPATIENT
Start: 2024-02-07

## 2024-04-04 ENCOUNTER — TELEPHONE (OUTPATIENT)
Age: 74
End: 2024-04-04

## 2024-05-02 RX ORDER — ALBUTEROL SULFATE 90 UG/1
2 AEROSOL, METERED RESPIRATORY (INHALATION) EVERY 6 HOURS PRN
Qty: 18 G | Refills: 0 | Status: SHIPPED | OUTPATIENT
Start: 2024-05-02

## 2025-02-11 NOTE — TELEPHONE ENCOUNTER
Monitor Summary:     SR  (R) PVC  HR 76-97  0.20/0.09/0.40               Pt c/o lower back pain and urine has a foul odor. Pt has tried some otc meds with no relief. Pt wants to know if PCP will call something in to [facility]. Please advise.

## (undated) DEVICE — STERILE LATEX POWDER-FREE SURGICAL GLOVESWITH NITRILE COATING: Brand: PROTEXIS

## (undated) DEVICE — SET ADMIN L105IN 10GTT 3 NDL FREE CK VLV 2 PC M LUERLOCK

## (undated) DEVICE — SOLUTION IRRIG 250ML STRL H2O PLAS POUR BTL USP

## (undated) DEVICE — NEEDLE HYPO 23GA L1.5IN TURQ POLYPR HUB S STL THN WALL IM

## (undated) DEVICE — SET EXTN L41IN 2 NDL FREE VALVES FREE INJ PRT

## (undated) DEVICE — LINE SAMP O2 6.5FT W/FEMALE CONN F/ADULT CAPNOLINE PLUS

## (undated) DEVICE — 6 ML SYRINGE LUER-LOCK TIP: Brand: MONOJECT

## (undated) DEVICE — Device

## (undated) DEVICE — KENDALL 500 SERIES DIAPHORETIC FOAM MONITORING ELECTRODE - TEAR DROP SHAPE ( 30/PK): Brand: KENDALL

## (undated) DEVICE — SHIELD EYE AD STD UNIV WRP ARND W/ FLX EDGE SOFGUARD

## (undated) DEVICE — HYPODERMIC SAFETY NEEDLE: Brand: MAGELLAN

## (undated) DEVICE — NEEDLE HYPO 25GA L0.625IN BLU POLYPR HUB S STL REG BVL STR